# Patient Record
Sex: FEMALE | Race: WHITE | NOT HISPANIC OR LATINO | Employment: OTHER | URBAN - METROPOLITAN AREA
[De-identification: names, ages, dates, MRNs, and addresses within clinical notes are randomized per-mention and may not be internally consistent; named-entity substitution may affect disease eponyms.]

---

## 2017-11-17 ENCOUNTER — CONVERSION ENCOUNTER (OUTPATIENT)
Dept: MAMMOGRAPHY | Facility: HOSPITAL | Age: 47
End: 2017-11-17

## 2018-01-17 ENCOUNTER — CONVERSION ENCOUNTER (OUTPATIENT)
Dept: FAMILY MEDICINE CLINIC | Facility: CLINIC | Age: 48
End: 2018-01-17

## 2018-01-17 ENCOUNTER — OFFICE VISIT CONVERTED (OUTPATIENT)
Dept: FAMILY MEDICINE CLINIC | Facility: CLINIC | Age: 48
End: 2018-01-17
Attending: NURSE PRACTITIONER

## 2018-01-29 ENCOUNTER — CONVERSION ENCOUNTER (OUTPATIENT)
Dept: FAMILY MEDICINE CLINIC | Facility: CLINIC | Age: 48
End: 2018-01-29

## 2018-01-29 ENCOUNTER — OFFICE VISIT CONVERTED (OUTPATIENT)
Dept: FAMILY MEDICINE CLINIC | Facility: CLINIC | Age: 48
End: 2018-01-29
Attending: NURSE PRACTITIONER

## 2018-01-30 ENCOUNTER — OFFICE VISIT CONVERTED (OUTPATIENT)
Dept: NEUROSURGERY | Facility: CLINIC | Age: 48
End: 2018-01-30
Attending: PHYSICIAN ASSISTANT

## 2018-03-12 ENCOUNTER — OFFICE VISIT CONVERTED (OUTPATIENT)
Dept: FAMILY MEDICINE CLINIC | Facility: CLINIC | Age: 48
End: 2018-03-12
Attending: NURSE PRACTITIONER

## 2018-03-26 ENCOUNTER — OFFICE VISIT CONVERTED (OUTPATIENT)
Dept: FAMILY MEDICINE CLINIC | Facility: CLINIC | Age: 48
End: 2018-03-26
Attending: NURSE PRACTITIONER

## 2018-04-04 ENCOUNTER — OFFICE VISIT CONVERTED (OUTPATIENT)
Dept: FAMILY MEDICINE CLINIC | Facility: CLINIC | Age: 48
End: 2018-04-04
Attending: NURSE PRACTITIONER

## 2018-04-06 ENCOUNTER — OFFICE VISIT CONVERTED (OUTPATIENT)
Dept: SURGERY | Facility: CLINIC | Age: 48
End: 2018-04-06
Attending: SURGERY

## 2018-04-13 ENCOUNTER — OFFICE VISIT CONVERTED (OUTPATIENT)
Dept: SURGERY | Facility: CLINIC | Age: 48
End: 2018-04-13
Attending: SURGERY

## 2018-05-04 ENCOUNTER — OFFICE VISIT CONVERTED (OUTPATIENT)
Dept: SURGERY | Facility: CLINIC | Age: 48
End: 2018-05-04
Attending: SURGERY

## 2018-07-10 ENCOUNTER — OFFICE VISIT CONVERTED (OUTPATIENT)
Dept: FAMILY MEDICINE CLINIC | Facility: CLINIC | Age: 48
End: 2018-07-10
Attending: FAMILY MEDICINE

## 2018-07-10 ENCOUNTER — CONVERSION ENCOUNTER (OUTPATIENT)
Dept: FAMILY MEDICINE CLINIC | Facility: CLINIC | Age: 48
End: 2018-07-10

## 2018-08-20 ENCOUNTER — OFFICE VISIT CONVERTED (OUTPATIENT)
Dept: FAMILY MEDICINE CLINIC | Facility: CLINIC | Age: 48
End: 2018-08-20
Attending: NURSE PRACTITIONER

## 2018-08-21 ENCOUNTER — OFFICE VISIT CONVERTED (OUTPATIENT)
Dept: ORTHOPEDIC SURGERY | Facility: CLINIC | Age: 48
End: 2018-08-21
Attending: ORTHOPAEDIC SURGERY

## 2018-08-21 ENCOUNTER — CONVERSION ENCOUNTER (OUTPATIENT)
Dept: ORTHOPEDIC SURGERY | Facility: CLINIC | Age: 48
End: 2018-08-21

## 2018-09-25 ENCOUNTER — OFFICE VISIT CONVERTED (OUTPATIENT)
Dept: GASTROENTEROLOGY | Facility: CLINIC | Age: 48
End: 2018-09-25
Attending: NURSE PRACTITIONER

## 2018-09-25 ENCOUNTER — CONVERSION ENCOUNTER (OUTPATIENT)
Dept: GASTROENTEROLOGY | Facility: CLINIC | Age: 48
End: 2018-09-25

## 2018-09-26 ENCOUNTER — OFFICE VISIT CONVERTED (OUTPATIENT)
Dept: FAMILY MEDICINE CLINIC | Facility: CLINIC | Age: 48
End: 2018-09-26
Attending: NURSE PRACTITIONER

## 2018-10-29 ENCOUNTER — OFFICE VISIT CONVERTED (OUTPATIENT)
Dept: FAMILY MEDICINE CLINIC | Facility: CLINIC | Age: 48
End: 2018-10-29
Attending: NURSE PRACTITIONER

## 2018-11-20 ENCOUNTER — CONVERSION ENCOUNTER (OUTPATIENT)
Dept: OTHER | Facility: HOSPITAL | Age: 48
End: 2018-11-20

## 2019-01-07 ENCOUNTER — OFFICE VISIT CONVERTED (OUTPATIENT)
Dept: GASTROENTEROLOGY | Facility: CLINIC | Age: 49
End: 2019-01-07
Attending: NURSE PRACTITIONER

## 2019-01-10 ENCOUNTER — CONVERSION ENCOUNTER (OUTPATIENT)
Dept: FAMILY MEDICINE CLINIC | Facility: CLINIC | Age: 49
End: 2019-01-10

## 2019-01-10 ENCOUNTER — HOSPITAL ENCOUNTER (OUTPATIENT)
Dept: FAMILY MEDICINE CLINIC | Facility: CLINIC | Age: 49
Discharge: HOME | End: 2019-01-10
Attending: NURSE PRACTITIONER

## 2019-01-10 ENCOUNTER — OFFICE VISIT CONVERTED (OUTPATIENT)
Dept: FAMILY MEDICINE CLINIC | Facility: CLINIC | Age: 49
End: 2019-01-10
Attending: NURSE PRACTITIONER

## 2019-01-11 LAB — 25(OH)D3 SERPL-MCNC: 19.5 NG/ML (ref 30–100)

## 2019-01-17 ENCOUNTER — HOSPITAL ENCOUNTER (OUTPATIENT)
Dept: OTHER | Facility: HOSPITAL | Age: 49
Discharge: HOME | End: 2019-01-17
Attending: NURSE PRACTITIONER

## 2019-01-24 ENCOUNTER — HOSPITAL ENCOUNTER (OUTPATIENT)
Dept: PHYSICAL THERAPY | Facility: CLINIC | Age: 49
Setting detail: RECURRING SERIES
Discharge: HOME | End: 2019-04-03
Attending: NURSE PRACTITIONER

## 2019-01-28 ENCOUNTER — HOSPITAL ENCOUNTER (OUTPATIENT)
Dept: FAMILY MEDICINE CLINIC | Facility: CLINIC | Age: 49
Discharge: HOME | End: 2019-01-28
Attending: NURSE PRACTITIONER

## 2019-01-28 LAB
ALBUMIN SERPL-MCNC: 3.7 G/DL (ref 3.5–5)
ALBUMIN/GLOB SERPL: 1.1 {RATIO} (ref 1.4–2.6)
ALP SERPL-CCNC: 64 U/L (ref 42–98)
ALT SERPL-CCNC: 13 U/L (ref 10–40)
ANION GAP SERPL CALC-SCNC: 16 MMOL/L (ref 8–19)
AST SERPL-CCNC: 16 U/L (ref 15–50)
BILIRUB SERPL-MCNC: <0.15 MG/DL (ref 0.2–1.3)
BUN SERPL-MCNC: 11 MG/DL (ref 5–25)
BUN/CREAT SERPL: 12 {RATIO} (ref 6–20)
CALCIUM SERPL-MCNC: 9.4 MG/DL (ref 8.7–10.4)
CHLORIDE SERPL-SCNC: 101 MMOL/L (ref 99–111)
CHOLEST SERPL-MCNC: 172 MG/DL (ref 107–200)
CHOLEST/HDLC SERPL: 4.2 {RATIO} (ref 3–6)
CONV CO2: 27 MMOL/L (ref 22–32)
CONV TOTAL PROTEIN: 7.1 G/DL (ref 6.3–8.2)
CREAT UR-MCNC: 0.9 MG/DL (ref 0.5–0.9)
GFR SERPLBLD BASED ON 1.73 SQ M-ARVRAT: >60 ML/MIN/{1.73_M2}
GLOBULIN UR ELPH-MCNC: 3.4 G/DL (ref 2–3.5)
GLUCOSE SERPL-MCNC: 73 MG/DL (ref 65–99)
HDLC SERPL-MCNC: 41 MG/DL (ref 40–60)
LDLC SERPL CALC-MCNC: 85 MG/DL (ref 70–100)
OSMOLALITY SERPL CALC.SUM OF ELEC: 286 MOSM/KG (ref 273–304)
POTASSIUM SERPL-SCNC: 4.6 MMOL/L (ref 3.5–5.3)
SODIUM SERPL-SCNC: 139 MMOL/L (ref 135–147)
TRIGL SERPL-MCNC: 230 MG/DL (ref 40–150)
VLDLC SERPL-MCNC: 46 MG/DL (ref 5–37)

## 2019-02-05 ENCOUNTER — OFFICE VISIT CONVERTED (OUTPATIENT)
Dept: FAMILY MEDICINE CLINIC | Facility: CLINIC | Age: 49
End: 2019-02-05
Attending: NURSE PRACTITIONER

## 2019-03-20 ENCOUNTER — HOSPITAL ENCOUNTER (OUTPATIENT)
Dept: ONCOLOGY | Facility: HOSPITAL | Age: 49
Discharge: HOME | End: 2019-03-20
Attending: INTERNAL MEDICINE

## 2019-03-20 LAB
BASOPHILS # BLD AUTO: 0.06 10*3/UL (ref 0–0.2)
BASOPHILS NFR BLD AUTO: 0.8 % (ref 0–3)
CONV ABS IMM GRAN: 0.04 10*3/UL (ref 0–0.2)
CONV IMMATURE GRAN: 0.5 % (ref 0–1.8)
DEPRECATED RDW RBC AUTO: 43.8 FL (ref 36.4–46.3)
EOSINOPHIL # BLD AUTO: 0.12 10*3/UL (ref 0–0.7)
EOSINOPHIL # BLD AUTO: 1.6 % (ref 0–7)
ERYTHROCYTE [DISTWIDTH] IN BLOOD BY AUTOMATED COUNT: 12.8 % (ref 11.7–14.4)
FERRITIN SERPL-MCNC: 251 NG/ML (ref 10–200)
FOLATE SERPL-MCNC: 7.5 NG/ML (ref 4.8–20)
HBA1C MFR BLD: 13.4 G/DL (ref 12–16)
HCT VFR BLD AUTO: 38.6 % (ref 37–47)
IRON SATN MFR SERPL: 22 % (ref 20–55)
IRON SERPL-MCNC: 74 UG/DL (ref 60–170)
LYMPHOCYTES # BLD AUTO: 2.42 10*3/UL (ref 1–5)
MCH RBC QN AUTO: 32.7 PG (ref 27–31)
MCHC RBC AUTO-ENTMCNC: 34.7 G/DL (ref 33–37)
MCV RBC AUTO: 94.1 FL (ref 81–99)
MONOCYTES # BLD AUTO: 0.8 10*3/UL (ref 0.2–1.2)
MONOCYTES NFR BLD AUTO: 10.3 % (ref 3–10)
NEUTROPHILS # BLD AUTO: 4.3 10*3/UL (ref 2–8)
NEUTROPHILS NFR BLD AUTO: 55.5 % (ref 30–85)
NRBC CBCN: 0 % (ref 0–0.7)
PLATELET # BLD AUTO: 279 10*3/UL (ref 130–400)
PMV BLD AUTO: 9.4 FL (ref 9.4–12.3)
RBC # BLD AUTO: 4.1 10*6/UL (ref 4.2–5.4)
TIBC SERPL-MCNC: 340 UG/DL (ref 245–450)
TRANSFERRIN SERPL-MCNC: 238 MG/DL (ref 250–380)
VARIANT LYMPHS NFR BLD MANUAL: 31.3 % (ref 20–45)
VIT B12 SERPL-MCNC: 518 PG/ML (ref 211–911)
WBC # BLD AUTO: 7.74 10*3/UL (ref 4.8–10.8)

## 2019-04-02 ENCOUNTER — OFFICE VISIT CONVERTED (OUTPATIENT)
Dept: ORTHOPEDIC SURGERY | Facility: CLINIC | Age: 49
End: 2019-04-02
Attending: ORTHOPAEDIC SURGERY

## 2019-04-03 ENCOUNTER — HOSPITAL ENCOUNTER (OUTPATIENT)
Dept: ONCOLOGY | Facility: HOSPITAL | Age: 49
Discharge: HOME | End: 2019-04-03
Attending: NURSE PRACTITIONER

## 2019-04-03 ENCOUNTER — OFFICE VISIT CONVERTED (OUTPATIENT)
Dept: ONCOLOGY | Facility: HOSPITAL | Age: 49
End: 2019-04-03
Attending: NURSE PRACTITIONER

## 2019-04-09 ENCOUNTER — HOSPITAL ENCOUNTER (OUTPATIENT)
Dept: OTHER | Facility: HOSPITAL | Age: 49
Discharge: HOME | End: 2019-04-09
Attending: NURSE PRACTITIONER

## 2019-04-09 LAB
FERRITIN SERPL-MCNC: 175 NG/ML (ref 10–200)
HBA1C MFR BLD: 12.7 G/DL (ref 12–16)
HCT VFR BLD AUTO: 38.2 % (ref 37–47)

## 2019-04-10 ENCOUNTER — HOSPITAL ENCOUNTER (OUTPATIENT)
Dept: FAMILY MEDICINE CLINIC | Facility: CLINIC | Age: 49
Discharge: HOME | End: 2019-04-10
Attending: NURSE PRACTITIONER

## 2019-04-10 ENCOUNTER — OFFICE VISIT CONVERTED (OUTPATIENT)
Dept: FAMILY MEDICINE CLINIC | Facility: CLINIC | Age: 49
End: 2019-04-10
Attending: NURSE PRACTITIONER

## 2019-04-10 LAB
25(OH)D3 SERPL-MCNC: 22.7 NG/ML (ref 30–100)
FERRITIN SERPL-MCNC: 161 NG/ML (ref 10–200)
IRON SATN MFR SERPL: 24 % (ref 20–55)
IRON SERPL-MCNC: 79 UG/DL (ref 60–170)
TIBC SERPL-MCNC: 329 UG/DL (ref 245–450)
TRANSFERRIN SERPL-MCNC: 230 MG/DL (ref 250–380)

## 2019-04-17 ENCOUNTER — HOSPITAL ENCOUNTER (OUTPATIENT)
Dept: OTHER | Facility: HOSPITAL | Age: 49
Discharge: HOME | End: 2019-04-17
Attending: NURSE PRACTITIONER

## 2019-05-06 ENCOUNTER — CONVERSION ENCOUNTER (OUTPATIENT)
Dept: NEUROLOGY | Facility: CLINIC | Age: 49
End: 2019-05-06

## 2019-05-06 ENCOUNTER — OFFICE VISIT CONVERTED (OUTPATIENT)
Dept: NEUROSURGERY | Facility: CLINIC | Age: 49
End: 2019-05-06
Attending: PHYSICIAN ASSISTANT

## 2019-06-05 ENCOUNTER — HOSPITAL ENCOUNTER (OUTPATIENT)
Dept: FAMILY MEDICINE CLINIC | Facility: CLINIC | Age: 49
Discharge: HOME | End: 2019-06-05
Attending: NURSE PRACTITIONER

## 2019-06-05 ENCOUNTER — OFFICE VISIT CONVERTED (OUTPATIENT)
Dept: FAMILY MEDICINE CLINIC | Facility: CLINIC | Age: 49
End: 2019-06-05
Attending: NURSE PRACTITIONER

## 2019-06-05 ENCOUNTER — CONVERSION ENCOUNTER (OUTPATIENT)
Dept: FAMILY MEDICINE CLINIC | Facility: CLINIC | Age: 49
End: 2019-06-05

## 2019-06-05 LAB
25(OH)D3 SERPL-MCNC: 29.2 NG/ML (ref 30–100)
ALBUMIN SERPL-MCNC: 3.9 G/DL (ref 3.5–5)
ALBUMIN/GLOB SERPL: 1.1 {RATIO} (ref 1.4–2.6)
ALP SERPL-CCNC: 75 U/L (ref 42–98)
ALT SERPL-CCNC: 18 U/L (ref 10–40)
ANION GAP SERPL CALC-SCNC: 15 MMOL/L (ref 8–19)
AST SERPL-CCNC: 27 U/L (ref 15–50)
BASOPHILS # BLD AUTO: 0.07 10*3/UL (ref 0–0.2)
BASOPHILS NFR BLD AUTO: 0.8 % (ref 0–3)
BILIRUB SERPL-MCNC: 0.42 MG/DL (ref 0.2–1.3)
BUN SERPL-MCNC: 5 MG/DL (ref 5–25)
BUN/CREAT SERPL: 6 {RATIO} (ref 6–20)
CALCIUM SERPL-MCNC: 9.1 MG/DL (ref 8.7–10.4)
CHLORIDE SERPL-SCNC: 98 MMOL/L (ref 99–111)
CONV ABS IMM GRAN: 0.05 10*3/UL (ref 0–0.2)
CONV CO2: 27 MMOL/L (ref 22–32)
CONV IMMATURE GRAN: 0.6 % (ref 0–1.8)
CONV TOTAL PROTEIN: 7.3 G/DL (ref 6.3–8.2)
CREAT UR-MCNC: 0.83 MG/DL (ref 0.5–0.9)
DEPRECATED RDW RBC AUTO: 45.4 FL (ref 36.4–46.3)
EOSINOPHIL # BLD AUTO: 0.22 10*3/UL (ref 0–0.7)
EOSINOPHIL # BLD AUTO: 2.5 % (ref 0–7)
ERYTHROCYTE [DISTWIDTH] IN BLOOD BY AUTOMATED COUNT: 12.6 % (ref 11.7–14.4)
FERRITIN SERPL-MCNC: 179 NG/ML (ref 10–200)
FOLATE SERPL-MCNC: 10.3 NG/ML (ref 4.8–20)
FSH SERPL-ACNC: 7 M[IU]/ML
GFR SERPLBLD BASED ON 1.73 SQ M-ARVRAT: >60 ML/MIN/{1.73_M2}
GLOBULIN UR ELPH-MCNC: 3.4 G/DL (ref 2–3.5)
GLUCOSE SERPL-MCNC: 113 MG/DL (ref 65–99)
HBA1C MFR BLD: 14.6 G/DL (ref 12–16)
HCT VFR BLD AUTO: 43.6 % (ref 37–47)
IRON SATN MFR SERPL: 33 % (ref 20–55)
IRON SERPL-MCNC: 110 UG/DL (ref 60–170)
LH SERPL-ACNC: 12.9 M[IU]/ML
LYMPHOCYTES # BLD AUTO: 2.68 10*3/UL (ref 1–5)
MCH RBC QN AUTO: 32.9 PG (ref 27–31)
MCHC RBC AUTO-ENTMCNC: 33.5 G/DL (ref 33–37)
MCV RBC AUTO: 98.2 FL (ref 81–99)
MONOCYTES # BLD AUTO: 0.75 10*3/UL (ref 0.2–1.2)
MONOCYTES NFR BLD AUTO: 8.6 % (ref 3–10)
NEUTROPHILS # BLD AUTO: 5 10*3/UL (ref 2–8)
NEUTROPHILS NFR BLD AUTO: 56.9 % (ref 30–85)
NRBC CBCN: 0 % (ref 0–0.7)
OSMOLALITY SERPL CALC.SUM OF ELEC: 282 MOSM/KG (ref 273–304)
PLATELET # BLD AUTO: 293 10*3/UL (ref 130–400)
PMV BLD AUTO: 10.1 FL (ref 9.4–12.3)
POTASSIUM SERPL-SCNC: 3.4 MMOL/L (ref 3.5–5.3)
RBC # BLD AUTO: 4.44 10*6/UL (ref 4.2–5.4)
SODIUM SERPL-SCNC: 137 MMOL/L (ref 135–147)
T4 FREE SERPL-MCNC: 1.2 NG/DL (ref 0.9–1.8)
TIBC SERPL-MCNC: 333 UG/DL (ref 245–450)
TRANSFERRIN SERPL-MCNC: 233 MG/DL (ref 250–380)
TSH SERPL-ACNC: 1.65 M[IU]/L (ref 0.27–4.2)
VARIANT LYMPHS NFR BLD MANUAL: 30.6 % (ref 20–45)
VIT B12 SERPL-MCNC: 553 PG/ML (ref 211–911)
WBC # BLD AUTO: 8.77 10*3/UL (ref 4.8–10.8)

## 2019-06-09 LAB — CONV ESTROGENS, TOTAL, SERUM: 394 PG/ML

## 2019-07-01 ENCOUNTER — OFFICE VISIT CONVERTED (OUTPATIENT)
Dept: NEUROSURGERY | Facility: CLINIC | Age: 49
End: 2019-07-01
Attending: PHYSICIAN ASSISTANT

## 2019-07-17 ENCOUNTER — OFFICE VISIT CONVERTED (OUTPATIENT)
Dept: GASTROENTEROLOGY | Facility: CLINIC | Age: 49
End: 2019-07-17
Attending: NURSE PRACTITIONER

## 2019-08-07 ENCOUNTER — HOSPITAL ENCOUNTER (OUTPATIENT)
Dept: FAMILY MEDICINE CLINIC | Facility: CLINIC | Age: 49
Discharge: HOME | End: 2019-08-07
Attending: NURSE PRACTITIONER

## 2019-08-07 ENCOUNTER — OFFICE VISIT CONVERTED (OUTPATIENT)
Dept: FAMILY MEDICINE CLINIC | Facility: CLINIC | Age: 49
End: 2019-08-07
Attending: NURSE PRACTITIONER

## 2019-08-07 LAB — 25(OH)D3 SERPL-MCNC: 31.7 NG/ML (ref 30–100)

## 2019-11-04 ENCOUNTER — HOSPITAL ENCOUNTER (OUTPATIENT)
Dept: FAMILY MEDICINE CLINIC | Facility: CLINIC | Age: 49
Discharge: HOME | End: 2019-11-04
Attending: NURSE PRACTITIONER

## 2019-11-04 ENCOUNTER — OFFICE VISIT CONVERTED (OUTPATIENT)
Dept: FAMILY MEDICINE CLINIC | Facility: CLINIC | Age: 49
End: 2019-11-04
Attending: NURSE PRACTITIONER

## 2019-11-04 LAB
ALBUMIN SERPL-MCNC: 3.9 G/DL (ref 3.5–5)
ALBUMIN/GLOB SERPL: 1.2 {RATIO} (ref 1.4–2.6)
ALP SERPL-CCNC: 62 U/L (ref 42–98)
ALT SERPL-CCNC: 13 U/L (ref 10–40)
ANION GAP SERPL CALC-SCNC: 17 MMOL/L (ref 8–19)
AST SERPL-CCNC: 25 U/L (ref 15–50)
BASOPHILS # BLD AUTO: 0.07 10*3/UL (ref 0–0.2)
BASOPHILS NFR BLD AUTO: 0.8 % (ref 0–3)
BILIRUB SERPL-MCNC: 0.35 MG/DL (ref 0.2–1.3)
BUN SERPL-MCNC: 3 MG/DL (ref 5–25)
BUN/CREAT SERPL: 3 {RATIO} (ref 6–20)
CALCIUM SERPL-MCNC: 9.7 MG/DL (ref 8.7–10.4)
CHLORIDE SERPL-SCNC: 97 MMOL/L (ref 99–111)
CONV ABS IMM GRAN: 0.02 10*3/UL (ref 0–0.2)
CONV CO2: 28 MMOL/L (ref 22–32)
CONV IMMATURE GRAN: 0.2 % (ref 0–1.8)
CONV TOTAL PROTEIN: 7.1 G/DL (ref 6.3–8.2)
CREAT UR-MCNC: 0.9 MG/DL (ref 0.5–0.9)
DEPRECATED RDW RBC AUTO: 44.3 FL (ref 36.4–46.3)
EOSINOPHIL # BLD AUTO: 0.4 10*3/UL (ref 0–0.7)
EOSINOPHIL # BLD AUTO: 4.4 % (ref 0–7)
ERYTHROCYTE [DISTWIDTH] IN BLOOD BY AUTOMATED COUNT: 12.7 % (ref 11.7–14.4)
ERYTHROCYTE [SEDIMENTATION RATE] IN BLOOD: 12 MM/H (ref 0–30)
GFR SERPLBLD BASED ON 1.73 SQ M-ARVRAT: >60 ML/MIN/{1.73_M2}
GLOBULIN UR ELPH-MCNC: 3.2 G/DL (ref 2–3.5)
GLUCOSE SERPL-MCNC: 87 MG/DL (ref 65–99)
HCT VFR BLD AUTO: 39 % (ref 37–47)
HGB BLD-MCNC: 13.6 G/DL (ref 12–16)
LYMPHOCYTES # BLD AUTO: 3.43 10*3/UL (ref 1–5)
LYMPHOCYTES NFR BLD AUTO: 38 % (ref 20–45)
MCH RBC QN AUTO: 33.3 PG (ref 27–31)
MCHC RBC AUTO-ENTMCNC: 34.9 G/DL (ref 33–37)
MCV RBC AUTO: 95.4 FL (ref 81–99)
MONOCYTES # BLD AUTO: 0.82 10*3/UL (ref 0.2–1.2)
MONOCYTES NFR BLD AUTO: 9.1 % (ref 3–10)
NEUTROPHILS # BLD AUTO: 4.29 10*3/UL (ref 2–8)
NEUTROPHILS NFR BLD AUTO: 47.5 % (ref 30–85)
NRBC CBCN: 0 % (ref 0–0.7)
OSMOLALITY SERPL CALC.SUM OF ELEC: 282 MOSM/KG (ref 273–304)
PLATELET # BLD AUTO: 247 10*3/UL (ref 130–400)
PMV BLD AUTO: 10.3 FL (ref 9.4–12.3)
POTASSIUM SERPL-SCNC: 3.9 MMOL/L (ref 3.5–5.3)
RBC # BLD AUTO: 4.09 10*6/UL (ref 4.2–5.4)
SODIUM SERPL-SCNC: 138 MMOL/L (ref 135–147)
WBC # BLD AUTO: 9.03 10*3/UL (ref 4.8–10.8)

## 2019-11-05 LAB — FERRITIN SERPL-MCNC: 140 NG/ML (ref 10–200)

## 2019-11-11 ENCOUNTER — HOSPITAL ENCOUNTER (OUTPATIENT)
Dept: OTHER | Facility: HOSPITAL | Age: 49
Discharge: HOME | End: 2019-11-11
Attending: NURSE PRACTITIONER

## 2019-11-19 ENCOUNTER — OFFICE VISIT CONVERTED (OUTPATIENT)
Dept: FAMILY MEDICINE CLINIC | Facility: CLINIC | Age: 49
End: 2019-11-19
Attending: NURSE PRACTITIONER

## 2020-03-10 ENCOUNTER — HOSPITAL ENCOUNTER (OUTPATIENT)
Dept: OTHER | Facility: HOSPITAL | Age: 50
Discharge: HOME | End: 2020-03-10
Attending: NURSE PRACTITIONER

## 2020-04-01 ENCOUNTER — HOSPITAL ENCOUNTER (OUTPATIENT)
Dept: ONCOLOGY | Facility: HOSPITAL | Age: 50
Discharge: HOME | End: 2020-04-01
Attending: NURSE PRACTITIONER

## 2020-04-01 ENCOUNTER — OFFICE VISIT CONVERTED (OUTPATIENT)
Dept: ONCOLOGY | Facility: HOSPITAL | Age: 50
End: 2020-04-01
Attending: NURSE PRACTITIONER

## 2020-04-01 LAB
ALBUMIN SERPL-MCNC: 4.2 G/DL (ref 3.5–5)
ALBUMIN/GLOB SERPL: 1.3 {RATIO} (ref 1.4–2.6)
ALP SERPL-CCNC: 77 U/L (ref 42–98)
ALT SERPL-CCNC: 15 U/L (ref 10–40)
ANION GAP SERPL CALC-SCNC: 17 MMOL/L (ref 8–19)
AST SERPL-CCNC: 22 U/L (ref 15–50)
BASOPHILS # BLD AUTO: 0.08 10*3/UL (ref 0–0.2)
BASOPHILS NFR BLD AUTO: 0.8 % (ref 0–3)
BILIRUB SERPL-MCNC: 0.37 MG/DL (ref 0.2–1.3)
BUN SERPL-MCNC: 5 MG/DL (ref 5–25)
BUN/CREAT SERPL: 7 {RATIO} (ref 6–20)
CALCIUM SERPL-MCNC: 9.7 MG/DL (ref 8.7–10.4)
CHLORIDE SERPL-SCNC: 99 MMOL/L (ref 99–111)
CONV ABS IMM GRAN: 0.04 10*3/UL (ref 0–0.2)
CONV CO2: 28 MMOL/L (ref 22–32)
CONV IMMATURE GRAN: 0.4 % (ref 0–1.8)
CONV TOTAL PROTEIN: 7.5 G/DL (ref 6.3–8.2)
CREAT UR-MCNC: 0.76 MG/DL (ref 0.5–0.9)
DEPRECATED RDW RBC AUTO: 47.5 FL (ref 36.4–46.3)
EOSINOPHIL # BLD AUTO: 0.26 10*3/UL (ref 0–0.7)
EOSINOPHIL # BLD AUTO: 2.7 % (ref 0–7)
ERYTHROCYTE [DISTWIDTH] IN BLOOD BY AUTOMATED COUNT: 13.1 % (ref 11.7–14.4)
FERRITIN SERPL-MCNC: 193 NG/ML (ref 10–200)
GFR SERPLBLD BASED ON 1.73 SQ M-ARVRAT: >60 ML/MIN/{1.73_M2}
GLOBULIN UR ELPH-MCNC: 3.3 G/DL (ref 2–3.5)
GLUCOSE SERPL-MCNC: 62 MG/DL (ref 65–99)
HCT VFR BLD AUTO: 45.3 % (ref 37–47)
HGB BLD-MCNC: 15.1 G/DL (ref 12–16)
IRON SATN MFR SERPL: 36 % (ref 20–55)
IRON SERPL-MCNC: 110 UG/DL (ref 60–170)
LDH SERPL-CCNC: 158 U/L (ref 120–240)
LYMPHOCYTES # BLD AUTO: 2.77 10*3/UL (ref 1–5)
LYMPHOCYTES NFR BLD AUTO: 29.1 % (ref 20–45)
MAGNESIUM SERPL-MCNC: 1.57 MG/DL (ref 1.6–2.3)
MCH RBC QN AUTO: 32.9 PG (ref 27–31)
MCHC RBC AUTO-ENTMCNC: 33.3 G/DL (ref 33–37)
MCV RBC AUTO: 98.7 FL (ref 81–99)
MONOCYTES # BLD AUTO: 0.78 10*3/UL (ref 0.2–1.2)
MONOCYTES NFR BLD AUTO: 8.2 % (ref 3–10)
NEUTROPHILS # BLD AUTO: 5.59 10*3/UL (ref 2–8)
NEUTROPHILS NFR BLD AUTO: 58.8 % (ref 30–85)
NRBC CBCN: 0 % (ref 0–0.7)
OSMOLALITY SERPL CALC.SUM OF ELEC: 285 MOSM/KG (ref 273–304)
PLATELET # BLD AUTO: 294 10*3/UL (ref 130–400)
PMV BLD AUTO: 10.2 FL (ref 9.4–12.3)
POTASSIUM SERPL-SCNC: 3.8 MMOL/L (ref 3.5–5.3)
RBC # BLD AUTO: 4.59 10*6/UL (ref 4.2–5.4)
SODIUM SERPL-SCNC: 140 MMOL/L (ref 135–147)
TIBC SERPL-MCNC: 307 UG/DL (ref 245–450)
TRANSFERRIN SERPL-MCNC: 215 MG/DL (ref 250–380)
WBC # BLD AUTO: 9.52 10*3/UL (ref 4.8–10.8)

## 2020-06-02 ENCOUNTER — HOSPITAL ENCOUNTER (OUTPATIENT)
Dept: OTHER | Facility: HOSPITAL | Age: 50
Discharge: HOME | End: 2020-06-02
Attending: NURSE PRACTITIONER

## 2020-06-05 ENCOUNTER — OFFICE VISIT CONVERTED (OUTPATIENT)
Dept: ONCOLOGY | Facility: HOSPITAL | Age: 50
End: 2020-06-05
Attending: NURSE PRACTITIONER

## 2020-06-05 ENCOUNTER — HOSPITAL ENCOUNTER (OUTPATIENT)
Dept: ONCOLOGY | Facility: HOSPITAL | Age: 50
Discharge: HOME | End: 2020-06-05
Attending: NURSE PRACTITIONER

## 2020-06-05 LAB
APPEARANCE UR: CLEAR
BILIRUB UR QL: NEGATIVE
COLOR UR: YELLOW
CONV BACTERIA: NEGATIVE
CONV COLLECTION SOURCE (UA): ABNORMAL
CONV UROBILINOGEN IN URINE BY AUTOMATED TEST STRIP: 0.2 {EHRLICHU}/DL (ref 0.1–1)
GLUCOSE UR QL: NEGATIVE MG/DL
HGB UR QL STRIP: NEGATIVE
KETONES UR QL STRIP: NEGATIVE MG/DL
LEUKOCYTE ESTERASE UR QL STRIP: ABNORMAL
NITRITE UR QL STRIP: POSITIVE
PH UR STRIP.AUTO: 7 [PH] (ref 5–8)
PROT UR QL: NEGATIVE MG/DL
RBC #/AREA URNS HPF: ABNORMAL /[HPF]
SP GR UR: 1.01 (ref 1–1.03)
SQUAMOUS SPT QL MICRO: ABNORMAL /[HPF]
WBC #/AREA URNS HPF: ABNORMAL /[HPF]

## 2020-06-07 LAB — BACTERIA UR CULT: NORMAL

## 2020-06-23 ENCOUNTER — HOSPITAL ENCOUNTER (OUTPATIENT)
Dept: FAMILY MEDICINE CLINIC | Facility: CLINIC | Age: 50
Discharge: HOME | End: 2020-06-23
Attending: NURSE PRACTITIONER

## 2020-06-23 ENCOUNTER — OFFICE VISIT CONVERTED (OUTPATIENT)
Dept: FAMILY MEDICINE CLINIC | Facility: CLINIC | Age: 50
End: 2020-06-23
Attending: NURSE PRACTITIONER

## 2020-06-24 ENCOUNTER — HOSPITAL ENCOUNTER (OUTPATIENT)
Dept: OTHER | Facility: HOSPITAL | Age: 50
Discharge: HOME | End: 2020-06-24
Attending: NURSE PRACTITIONER

## 2020-06-24 LAB
ALBUMIN SERPL-MCNC: 3.7 G/DL (ref 3.5–5)
ALBUMIN SERPL-MCNC: 3.8 G/DL (ref 3.5–5)
ALBUMIN/GLOB SERPL: 1.2 {RATIO} (ref 1.4–2.6)
ALBUMIN/GLOB SERPL: 1.2 {RATIO} (ref 1.4–2.6)
ALP SERPL-CCNC: 69 U/L (ref 42–98)
ALP SERPL-CCNC: 70 U/L (ref 42–98)
ALT SERPL-CCNC: 12 U/L (ref 10–40)
ALT SERPL-CCNC: 14 U/L (ref 10–40)
ANION GAP SERPL CALC-SCNC: 18 MMOL/L (ref 8–19)
ANION GAP SERPL CALC-SCNC: 19 MMOL/L (ref 8–19)
APPEARANCE UR: ABNORMAL
AST SERPL-CCNC: 27 U/L (ref 15–50)
AST SERPL-CCNC: 28 U/L (ref 15–50)
BASOPHILS # BLD AUTO: 0.08 10*3/UL (ref 0–0.2)
BASOPHILS NFR BLD AUTO: 0.9 % (ref 0–3)
BILIRUB SERPL-MCNC: 0.24 MG/DL (ref 0.2–1.3)
BILIRUB SERPL-MCNC: 0.54 MG/DL (ref 0.2–1.3)
BILIRUB UR QL: NEGATIVE
BUN SERPL-MCNC: 3 MG/DL (ref 5–25)
BUN SERPL-MCNC: 3 MG/DL (ref 5–25)
BUN/CREAT SERPL: 4 {RATIO} (ref 6–20)
BUN/CREAT SERPL: 4 {RATIO} (ref 6–20)
CALCIUM SERPL-MCNC: 9.4 MG/DL (ref 8.7–10.4)
CALCIUM SERPL-MCNC: 9.5 MG/DL (ref 8.7–10.4)
CHLORIDE SERPL-SCNC: 100 MMOL/L (ref 99–111)
CHLORIDE SERPL-SCNC: 101 MMOL/L (ref 99–111)
CHOLEST SERPL-MCNC: 165 MG/DL (ref 107–200)
CHOLEST/HDLC SERPL: 4.1 {RATIO} (ref 3–6)
COLOR UR: YELLOW
CONV ABS IMM GRAN: 0.03 10*3/UL (ref 0–0.2)
CONV BACTERIA: ABNORMAL
CONV CO2: 22 MMOL/L (ref 22–32)
CONV CO2: 22 MMOL/L (ref 22–32)
CONV COLLECTION SOURCE (UA): ABNORMAL
CONV HYALINE CASTS IN URINE MICRO: ABNORMAL /[LPF]
CONV IMMATURE GRAN: 0.3 % (ref 0–1.8)
CONV TOTAL PROTEIN: 6.7 G/DL (ref 6.3–8.2)
CONV TOTAL PROTEIN: 6.9 G/DL (ref 6.3–8.2)
CONV UROBILINOGEN IN URINE BY AUTOMATED TEST STRIP: 1 {EHRLICHU}/DL (ref 0.1–1)
CREAT UR-MCNC: 0.78 MG/DL (ref 0.5–0.9)
CREAT UR-MCNC: 0.8 MG/DL (ref 0.5–0.9)
DEPRECATED RDW RBC AUTO: 44.5 FL (ref 36.4–46.3)
EOSINOPHIL # BLD AUTO: 0.15 10*3/UL (ref 0–0.7)
EOSINOPHIL # BLD AUTO: 1.7 % (ref 0–7)
ERYTHROCYTE [DISTWIDTH] IN BLOOD BY AUTOMATED COUNT: 12.5 % (ref 11.7–14.4)
GFR SERPLBLD BASED ON 1.73 SQ M-ARVRAT: >60 ML/MIN/{1.73_M2}
GFR SERPLBLD BASED ON 1.73 SQ M-ARVRAT: >60 ML/MIN/{1.73_M2}
GLOBULIN UR ELPH-MCNC: 3 G/DL (ref 2–3.5)
GLOBULIN UR ELPH-MCNC: 3.1 G/DL (ref 2–3.5)
GLUCOSE SERPL-MCNC: 82 MG/DL (ref 65–99)
GLUCOSE SERPL-MCNC: 96 MG/DL (ref 65–99)
GLUCOSE UR QL: NEGATIVE MG/DL
HCT VFR BLD AUTO: 43.9 % (ref 37–47)
HDLC SERPL-MCNC: 40 MG/DL (ref 40–60)
HGB BLD-MCNC: 14.7 G/DL (ref 12–16)
HGB UR QL STRIP: NEGATIVE
KETONES UR QL STRIP: NEGATIVE MG/DL
LDLC SERPL CALC-MCNC: 104 MG/DL (ref 70–100)
LEUKOCYTE ESTERASE UR QL STRIP: NEGATIVE
LYMPHOCYTES # BLD AUTO: 2.89 10*3/UL (ref 1–5)
LYMPHOCYTES NFR BLD AUTO: 32 % (ref 20–45)
MCH RBC QN AUTO: 32.4 PG (ref 27–31)
MCHC RBC AUTO-ENTMCNC: 33.5 G/DL (ref 33–37)
MCV RBC AUTO: 96.7 FL (ref 81–99)
MONOCYTES # BLD AUTO: 0.65 10*3/UL (ref 0.2–1.2)
MONOCYTES NFR BLD AUTO: 7.2 % (ref 3–10)
NEUTROPHILS # BLD AUTO: 5.22 10*3/UL (ref 2–8)
NEUTROPHILS NFR BLD AUTO: 57.9 % (ref 30–85)
NITRITE UR QL STRIP: NEGATIVE
NRBC CBCN: 0 % (ref 0–0.7)
OSMOLALITY SERPL CALC.SUM OF ELEC: 280 MOSM/KG (ref 273–304)
OSMOLALITY SERPL CALC.SUM OF ELEC: 280 MOSM/KG (ref 273–304)
PH UR STRIP.AUTO: 7.5 [PH] (ref 5–8)
PLATELET # BLD AUTO: 271 10*3/UL (ref 130–400)
PMV BLD AUTO: 10.3 FL (ref 9.4–12.3)
POTASSIUM SERPL-SCNC: 3.9 MMOL/L (ref 3.5–5.3)
POTASSIUM SERPL-SCNC: 4 MMOL/L (ref 3.5–5.3)
PROT UR QL: ABNORMAL MG/DL
RBC # BLD AUTO: 4.54 10*6/UL (ref 4.2–5.4)
RBC #/AREA URNS HPF: ABNORMAL /[HPF]
SODIUM SERPL-SCNC: 137 MMOL/L (ref 135–147)
SODIUM SERPL-SCNC: 137 MMOL/L (ref 135–147)
SP GR UR: 1.02 (ref 1–1.03)
SQUAMOUS SPT QL MICRO: ABNORMAL /[HPF]
TRIGL SERPL-MCNC: 106 MG/DL (ref 40–150)
VLDLC SERPL-MCNC: 21 MG/DL (ref 5–37)
WBC # BLD AUTO: 9.02 10*3/UL (ref 4.8–10.8)
WBC #/AREA URNS HPF: ABNORMAL /[HPF]

## 2020-06-25 LAB
CHOLEST SERPL-MCNC: 169 MG/DL (ref 107–200)
CHOLEST/HDLC SERPL: 4.3 {RATIO} (ref 3–6)
HDLC SERPL-MCNC: 39 MG/DL (ref 40–60)
LDLC SERPL CALC-MCNC: 108 MG/DL (ref 70–100)
TRIGL SERPL-MCNC: 109 MG/DL (ref 40–150)
VLDLC SERPL-MCNC: 22 MG/DL (ref 5–37)

## 2020-06-26 LAB
BACTERIA UR CULT: NORMAL
BACTERIA UR CULT: NORMAL

## 2020-06-30 ENCOUNTER — OFFICE VISIT CONVERTED (OUTPATIENT)
Dept: UROLOGY | Facility: CLINIC | Age: 50
End: 2020-06-30
Attending: UROLOGY

## 2020-06-30 ENCOUNTER — CONVERSION ENCOUNTER (OUTPATIENT)
Dept: SURGERY | Facility: CLINIC | Age: 50
End: 2020-06-30

## 2020-07-16 ENCOUNTER — PROCEDURE VISIT CONVERTED (OUTPATIENT)
Dept: UROLOGY | Facility: CLINIC | Age: 50
End: 2020-07-16
Attending: UROLOGY

## 2020-08-06 ENCOUNTER — HOSPITAL ENCOUNTER (OUTPATIENT)
Dept: ONCOLOGY | Facility: HOSPITAL | Age: 50
Setting detail: RECURRING SERIES
Discharge: STILL ADMITTED OR ADMINISTRATIVE | End: 2020-11-02
Attending: NURSE PRACTITIONER

## 2020-08-06 LAB
FERRITIN SERPL-MCNC: 153 NG/ML (ref 10–200)
HCT VFR BLD AUTO: 44.2 % (ref 37–47)
HGB BLD-MCNC: 15 G/DL (ref 12–16)

## 2020-11-23 ENCOUNTER — OFFICE VISIT CONVERTED (OUTPATIENT)
Dept: GASTROENTEROLOGY | Facility: CLINIC | Age: 50
End: 2020-11-23
Attending: NURSE PRACTITIONER

## 2020-11-24 ENCOUNTER — TELEPHONE CONVERTED (OUTPATIENT)
Dept: FAMILY MEDICINE CLINIC | Facility: CLINIC | Age: 50
End: 2020-11-24
Attending: NURSE PRACTITIONER

## 2020-11-30 ENCOUNTER — HOSPITAL ENCOUNTER (OUTPATIENT)
Dept: OTHER | Facility: HOSPITAL | Age: 50
Discharge: HOME | End: 2020-11-30
Attending: NURSE PRACTITIONER

## 2020-11-30 LAB
CREAT BLD-MCNC: 0.7 MG/DL (ref 0.6–1.4)
GFR SERPLBLD BASED ON 1.73 SQ M-ARVRAT: >60 ML/MIN/{1.73_M2}

## 2020-12-16 ENCOUNTER — TELEPHONE CONVERTED (OUTPATIENT)
Dept: UROLOGY | Facility: CLINIC | Age: 50
End: 2020-12-16
Attending: UROLOGY

## 2021-01-26 ENCOUNTER — OFFICE VISIT CONVERTED (OUTPATIENT)
Dept: GASTROENTEROLOGY | Facility: CLINIC | Age: 51
End: 2021-01-26
Attending: NURSE PRACTITIONER

## 2021-02-24 ENCOUNTER — OFFICE VISIT CONVERTED (OUTPATIENT)
Dept: FAMILY MEDICINE CLINIC | Facility: CLINIC | Age: 51
End: 2021-02-24
Attending: NURSE PRACTITIONER

## 2021-02-24 ENCOUNTER — CONVERSION ENCOUNTER (OUTPATIENT)
Dept: FAMILY MEDICINE CLINIC | Facility: CLINIC | Age: 51
End: 2021-02-24

## 2021-05-07 NOTE — PROGRESS NOTES
Progress Note      Patient Name: Holly Morin   Patient ID: [id number]   Sex: Female   YOB: 1970        Visit Date: June 5, 2019    Provider: BRANDO Mott   Location: [facility]   Location Address: [address] [place] [address]   [place]  [id number]   Location Phone: [phone]          Chief Complaint     follow up on lab work       History Of Present Illness  Holly Morin is a 48 year old /White female who presents for evaluation and treatment of:      pt has appoint with pain mgt 7/17/19    here for F/U on her labs for the elevated ferritin and the iron and the Vit D and then also needs the etodaloac refilled and helping--DDD of the Lspine and  right knee pain    goes from hot flash to old and covers on and covers off-Hx of partal hysterectomy     grief and depression per pt report is improving still missed her father--and then working on this at home and does positive self talk--and really helps-- very supportive     pt admits likes to stay in her room--like it's her happy place--    granddaughter will be born any day now--       Past Medical History  Disease Name Date Onset Notes   Abdominal pain, RUQ --  --    Anxiety --  --    Arthritis --  --    Bloating symptom --  --    Change in stool --  --    Colitis --  --    Colon Cancer --  --    Depression --  --    DJD (degenerative joint disease) of knee --  --    GERD (gastroesophageal reflux disease) --  --    Irritable bowel syndrome --  --    Seasonal allergies --  --          Past Surgical History  Procedure Name Date Notes   Cholecstectomy --  --    Cholecystecomy --  --    Gallbladder --  --    Hysterectomy --  --    Knee surgery --  --          Medication List  Name Date Started Instructions   baclofen 20 mg oral tablet 02/05/2019 take 1 tablet by oral route 3 times a day as needed for LBP or knee pain   buspirone 30 mg oral tablet 04/10/2019 take 1 tablet (30 mg) by oral route 2 times  per day for 30 days   etodolac 400 mg oral tablet 02/05/2019 TAKE ONE TABLET BY MOUTH THREE TIMES DAILY AS NEEDED FOR PAIN   Linzess 145 mcg oral capsule 08/20/2018 take 1 capsule (145 mcg) by oral route once daily on an empty stomach at least 30 minutes before 1st meal of the day   pantoprazole 40 mg oral tablet,delayed release (DR/EC) 08/20/2018 take 1 tablet (40 mg) by oral route once daily   ranitidine HCl 300 mg oral tablet 08/20/2018 take 1 tablet (300 mg) by oral route once daily at bedtime for 30 days   trazodone 100 mg oral tablet 04/10/2019 take 1 tablet (100 mg) by oral route once daily at bedtime   venlafaxine 150 mg oral capsule,extended release 24hr 04/10/2019 take 1 capsule (150 mg) by oral route once daily   Vitamin D2 50,000 unit oral capsule 04/11/2019 take 1 capsule (50,000 unit) by oral route once weekly for 30 days         Allergy List  Allergen Name Date Reaction Notes   PENICILLINS --  --  --    SULFA (SULFONAMIDES) --  --  --          Family Medical History  Disease Name Relative/Age Notes   Asthma Sister/   Sister   Depression Father/  Mother/   Father; Mother   DM Type II Mother/   Mother   Cardiac Conduction Disorder  --    Hypertension Father/   Father   Lung cancer  --    Diabetes mellitus, type II  --    Arthrtis Father/  Mother/   Father; Mother   Osteoporosis Mother/   Mother   Alcohol Abuse Father/   Father   Mother, Grandmother, or Sister developed Heart Disease before the age of 65  --          Social History  Finding Status Start/Stop Quantity Notes   Alcohol Never --/-- --  never drinks alcohol   Caffeine --  --/-- --  --    Exercises regularly --  --/-- --  0 times per week    --  --/-- --  --    Recreational Drug Use Never --/-- --  never used   Tobacco Never --/-- 2 PPD --    Uses seatbelts --  --/-- --  yes         Immunizations  NameDate Admin Mfg Trade Name Lot Number Route Inj VIS Given VIS Publication   Iakatmbrz47/29/2018 PMC FLUARIX [id number] IM  10/29/2018  08/07/2015   Comments: NDC [id number]   Dvbdrqdho19/01/2017 UNK Unknown TradeName [id number] NE NE 04/04/2018 08/07/2015   Comments:          Review of Systems  · Constitutional  o Admits  o : fatigue  · HENT  o Denies  o : hearing loss or ringing, chronic sinus problem, swollen glands in neck  · Cardiovascular  o Denies  o : chest pain, palpitations (fast, fluttering, or skipping beats), swelling (feet, ankles, hands), shortness of breath while walking or lying flat  · Respiratory  o Denies  o : shortness of breath, asthma or wheezing, COPD  · Musculoskeletal  o Admits  o : back pain, knee pain  · Endocrine  o Admits  o : cold intolerance, heat intolerance, hot flashes  o Denies  o : thyroid disease, diabetes, heat or cold intolerance, excessive thirst or urination  · Psychiatric  o Admits  o : anxiety, depression  o Denies  o : suicidal ideation, homicidal ideation  · Allergic-Immunologic  o Denies  o : frequent illnesses      Vitals  Date Time BP Position Site L\R Cuff Size HR RR TEMP (F) WT  HT  BMI kg/m2 BSA m2 O2 Sat        06/05/2019 10:09 /96 Sitting    113 - R  97.6 203lbs 7oz    96 %          Physical Examination  · Constitutional  o Appearance  o : well developed, well-nourished, in no acute distress  · Head and Face  o HEENT  o : Unremarkable  · Eyes  o Conjunctivae  o : conjunctivae normal  · Neck  o Inspection/Palpation  o : supple  o Thyroid  o : no thyromegaly  · Respiratory  o Respiratory Effort  o : breathing unlabored  o Auscultation of Lungs  o : clear to ascultation  · Cardiovascular  o Heart  o :   § Auscultation of Heart  § : regular rate and rhythm  o Peripheral Vascular System  o :   § Extremities  § : no edema  · Gastrointestinal  o Abdominal Examination  o :   § Abdomen  § : soft   · Lymphatic  o Neck  o : no lymphadenopathy present  · Musculoskeletal  o General  o :   § General Musculoskeletal  § : (+) chronic LBP and then the right knee with crepitus and left knee also but left also  with popping   · Skin and Subcutaneous Tissue  o General Inspection  o : skin turgor normal, texture normal  · Neurologic  o Gait and Station  o :   § Gait Screening  § : normal gait  · Psychiatric  o Mood and Affect  o : mood normal, affect appropriate          Assessment  · Fatigue     780.79/R53.83  · Vitamin D deficiency     268.9/E55.9  · DDD (degenerative disc disease), lumbar     722.52/M51.36  · Right knee pain     719.46/M25.561  · Elevated ferritin     790.6/R79.89  · Hot flashes     782.62/R23.2  · Grief reaction     309.0/F43.21  · Hemochromatosis     275.03/E83.119  · Anxiety with depression     300.4/F41.8      Plan  · Orders  o CBC with Auto Diff [facility] (95555) - 780.79/R53.83 - 06/05/2019  o CMP [facility] (92946) - 780.79/R53.83 - 06/05/2019  o Thyroid Profile (THYII) - 780.79/R53.83 - 06/05/2019  o Iron panel (iron, TIBC, transferrin saturation) (96591, 24825, 59802) - 780.79/R53.83 - 06/05/2019  o B12 Folate levels (B12FO) - 780.79/R53.83 - 06/05/2019  o Vitamin D Level (26176) - 268.9/E55.9 - 06/05/2019  o ACO-39: Current medications updated and reviewed () - - 06/05/2019  o Ferritin ser/plas (47403) - 790.6/R79.89, 780.79/R53.83 - 06/05/2019  o FSH (follicle stimulating hormone) (06255) - 780.79/R53.83, 782.62/R23.2 - 06/05/2019  o LH (luteinizing hormone) (93899) - 780.79/R53.83, 782.62/R23.2 - 06/05/2019  o Estrogen (Total) (68690) - 780.79/R53.83, 782.62/R23.2 - 06/05/2019  · Medications  o etodolac 400 mg oral tablet   SIG: TAKE ONE TABLET BY MOUTH THREE TIMES DAILY AS NEEDED FOR PAIN   DISP: (90) Tablet with 5 refills  Adjusted on 06/05/2019     · Instructions  o Recheck Vitamin D blood level in 8-12 weeks.  o Take all medications as prescribed/directed.  o Patient was educated/instructed on their diagnosis, treatment and medications prior to discharge from the clinic today.  o Patient instructed to seek medical attention urgently for new or worsening symptoms.  o Call the office with any  concerns or questions.  o Chronic conditions reviewed and taken into consideration for today's treatment plan.  · Disposition  o Call or Return if symptoms worsen or persist.     ****MAIL ALL LABS TO PT*****    and call pt should the ferritin be elevated--and then pt would need to call the hematology office for another blood draw             Electronically Signed by: BRANDO Mott -Author on June 5, 2019 10:45:01 AM

## 2021-05-07 NOTE — PROGRESS NOTES
Progress Note      Patient Name: Holly Morin   Patient ID: [id number]   Sex: Female   YOB: 1970        Visit Date: January 29, 2018    Provider: BRANDO Mott   Location: [facility]   Location Address: [address] [place] [address]  [id number]   Location Phone: [phone]          Chief Complaint     fup to knot on collar bone, she has finished antibiotic       History Of Present Illness  Holly Morin is a 47 year old /White female who presents for evaluation and treatment of:      F/U on the swollen inner left clavicle area--and not tender and no change and pt was seen by DR Don and they did Keflex Dec--and then she F/U with me here for this and was improving and still small amt swelled and then DR White has postponed total knee due to this--    then sleeping pattern worse--already on the 50 mg of the elavil       Past Medical History  Disease Name Date Onset Notes   Abdominal pain, RUQ --  --    Anxiety --  --    Arthritis --  --    Bloating symptom --  --    Change in stool --  --    Colitis --  --    Colon Cancer --  --    Depression --  --    GERD (gastroesophageal reflux disease) --  --    Irritable Bowel Syndrome --  --    Seasonal allergies --  --          Past Surgical History  Procedure Name Date Notes   Cholecstectomy --  --    Cholecystecomy --  --    Gallbladder --  --    Hysterectomy --  --    Knee surgery --  --          Medication List  Name Date Started Instructions   amitriptyline 25 mg oral tablet 01/18/2018 take 1-2 tablets at bedtime as needed for the insomnia   buspirone 5 mg oral tablet  take 1 tablet (5 mg) by oral route 3 times per day   etodolac 400 mg oral tablet 11/28/2017 TAKE 1 TABLET 3 times daily PRN pain   Linzess 72 mcg oral capsule  take 1 capsule (72 mcg) by oral route once daily on an empty stomach at least 30 minutes before 1st meal of the day   ondansetron HCl 4 mg oral tablet 01/18/2018 take 1  tablet twice daily for nausea   pantoprazole 40 mg oral tablet,delayed release (DR/EC)  take 1 tablet (40 mg) by oral route once daily   Probiotic 10 billion cell oral capsule 01/18/2018 take 1 capsule by oral route QD for antibiotic use   ranitidine HCl 300 mg oral tablet 01/18/2018 take 1 tablet (300 mg) by oral route once daily at bedtime   venlafaxine 150 mg oral tablet extended release 24hr 01/18/2018 take 1 capsule 1daily with food         Allergy List  Allergen Name Date Reaction Notes   PENICILLINS --  --  --    SULFA (SULFONAMIDES) --  --  --          Family Medical History  Disease Name Relative/Age Notes   Alcohol Abuse / Father    Father/    Arthrtis / Father; Mother    Father/     Mother/    Asthma / Sister    Sister/    Cardiac Conduction Disorder / --    Depression / Father; Mother    Father/     Mother/    Diabetes mellitus, type II / --    DM Type II / Mother    Mother/    Hypertension / Father    Father/    Lung cancer / --    Mother, Grandmother, or Sister developed Heart Disease before the age of 65 / --    Osteoporosis / Mother    Mother/          Social History  Finding Status Start/Stop Quantity Notes   Alcohol Never --/-- --  never drinks alcohol   Caffeine --  --/-- --  --    Exercises regularly --  --/-- --  0 times per week    --  --/-- --  --    Recreational Drug Use Never --/-- --  never used   Tobacco Former --/-- 2 PPD former smoker, smokes 1 to 2 packs per day, for 10 years, never uses other tobacco products   Uses seatbelts --  --/-- --  yes         Review of Systems  · Constitutional  o Admits  o : good general health lately  o Denies  o : fatigue, recent weight changes  · Eyes  o Denies  o : blurred or double vision  · HENT  o Denies  o : hearing loss or ringing, chronic sinus problem, swollen glands in neck  · Cardiovascular  o Denies  o : chest pain, palpitations (fast, fluttering, or skipping beats), swelling (feet, ankles, hands), shortness of breath while walking or  "lying flat  · Respiratory  o Denies  o : shortness of breath, asthma or wheezing, COPD  · Gastrointestinal  o Denies  o : ulcers, nausea or vomiting  · Neurologic  o Denies  o : lightheaded or dizzy, stroke, headaches  · Musculoskeletal  o Denies  o : joint pain, back pain  · Endocrine  o Denies  o : thyroid disease, diabetes, heat or cold intolerance, excessive thirst or urination  · Psychiatric  o Admits  o : depression, difficulty sleeping  o Denies  o : suicidal ideation, homicidal ideation  · Heme-Lymph  o Admits  o : (+) swelled area to the left inner clavicle--and tender and into the upper supraclavicular area  o Denies  o : bleeding or bruising tendency, anemia      Vitals  Date Time BP Position Site L\R Cuff Size HR RR TEMP(F) WT  HT  BMI kg/m2 BSA m2 O2 Sat HC       01/29/2018 10:06 /74 Sitting    120 - R  98 176lbs 8oz 5'  6.25\" 28.27 1.93 99 %           Physical Examination  · Constitutional  o Appearance  o : well developed, well-nourished, in no acute distress  · Eyes  o Conjunctivae  o : conjunctivae normal  · Neck  o Inspection/Palpation  o : supple  o Thyroid  o : no thyromegaly  · Respiratory  o Respiratory Effort  o : breathing unlabored  o Auscultation of Lungs  o : clear to ascultation  · Cardiovascular  o Heart  o :   § Auscultation of Heart  § : regular rate and rhythm  o Peripheral Vascular System  o :   § Extremities  § : no edema  · Lymphatic  o Neck  o : no lymphadenopathy present--except the area tot he left inner clavicle area swelled small amt--and not any bigger--but pt also tender in the supraclavicular are as well   · Musculoskeletal  o General  o :   § General Musculoskeletal  § : No joint swelling or deformity., Muscle tone, strength, and development grossly normal.  · Skin and Subcutaneous Tissue  o General Inspection  o : no lesions present, no areas of discoloration, skin turgor normal, texture normal  · Neurologic  o Gait and Station  o :   § Gait Screening  § : normal " gait  · Psychiatric  o Mood and Affect  o : mood normal, affect appropriate              Assessment  · Insomnia, unspecified     780.52/G47.00  · Lymphadenopathy, supraclavicular     785.6/R59.0  · Depression     311/F32.9      Plan  · Orders  o IOP - CBC (51524) - 785.6/R59.0 - 01/29/2018  o ACO-39: Current medications updated and reviewed () - - 01/29/2018  o Xray chest 2 views [facility] ([id number]) - 785.6/R59.0 - 01/29/2018   pt has had this for approx. 2 months-DR Don did one round antibiotics and then vastly smaller and then we repeated 1-2 weeks ago--and no change--and tender--at the left inner clavicle aspect--stat reading--  · Medications  o amitriptyline 75 mg oral tablet   SIG: take 1 tablet (75 mg) by oral route once daily at bedtime   DISP: (30) tablets with 5 refills  Adjusted on 01/29/2018     · Instructions  o Avoid any electronic use for at least 30 minutes prior to bed time. Cell phone screens, tablets and TVs immitate daylight, so your brain can become confused on the time of day. No caffeine use in the late afternoon and evenings.  o Take all medications as prescribed/directed.  o Patient was educated/instructed on their diagnosis, treatment and medications prior to discharge from the clinic today.  o Patient instructed to seek medical attention urgently for new or worsening symptoms.  o Call the office with any concerns or questions.  o Chronic conditions reviewed and taken into consideration for today's treatment plan.  · Disposition  o Call or Return if symptoms worsen or persist.     CBC still normal--WBC=7    will call pt with the results form the CXR-    then the pt needs to F/U with Dr Don for the lymphademopathy--since he saw her in the beginning for this-and sw it at it's worse--and maybe he can give her the clearance needed for the total knee--    the the pt also to let the neurosurgeon know as well at her appoint--    she may still need the CT of chest    addendum: CXR  was normal-pt to F/U with Dr Don with regards to this and called and D/W pt--             Electronically Signed by: BRANDO Mott -Author on January 29, 2018 11:42:41 AM

## 2021-05-07 NOTE — PROGRESS NOTES
Progress Note      Patient Name: Holly Morin   Patient ID: [id number]   Sex: Female   YOB: 1970    Referring Provider: Julissa RON    Visit Date: November 19, 2019    Provider: ASAF Mott   Location: [facility]   Location Address: [address] [place] [address]   [place]  [id number]   Location Phone: [phone]          Chief Complaint     Refills       History Of Present Illness  Holly Morin is a 49 year old /White female who presents for evaluation and treatment of:      trazodone refill--for insomnia--normally stable and reports just the last few nights trouble sleeping  and buspar refill--anxiety stable     mammo appoint at the [facility] diagnostic and pt already has this --and just needs the order to hand carry --but pt admits to tenderness of the breasts bilat     flu shot     (+) smoker--1 PPD--smoked 15 yrs --and reports still wheezes at times--and mentioned we were going to get the PFT       Past Medical History  Disease Name Date Onset Notes   Abdominal pain, RUQ --  --    Anxiety --  --    Arthritis --  --    Bloating symptom --  --    Change in stool --  --    Colitis --  --    Colon Cancer --  --    Depression --  --    DJD (degenerative joint disease) of knee --  --    GERD (gastroesophageal reflux disease) --  --    Irritable bowel syndrome --  --    Seasonal allergies --  --          Past Surgical History  Procedure Name Date Notes   Cholecstectomy --  --    Cholecystecomy --  --    Gallbladder --  --    Hysterectomy --  --    Knee surgery --  --          Medication List  Name Date Started Instructions   baclofen 20 mg oral tablet 11/04/2019 take 1 tablet by oral route 3 times a day as needed for LBP or knee pain   buspirone 30 mg oral tablet 11/19/2019 take 1 tablet (30 mg) by oral route 2 times per day for 30 days   Creon 36,000-114,000- 180,000 unit oral capsule,delayed release(/EC)  take 2 capsules by oral route 3 times  per day with meals and 1 capsule with each snack swallowing whole. Do not crush, chew and/or divide.   nicotine 21 mg/24 hr transdermal patch 24 hour 11/13/2019 apply 1 patch (21 mg) by transdermal route once daily and remove at bedtime for 30 days   ondansetron HCl 4 mg oral tablet 12/23/2019 TAKE ONE TABLET BY MOUTH TWICE DAILY AS NEEDED FOR NAUSEA AND VOMITING   pantoprazole 40 mg oral tablet,delayed release (DR/EC) 08/20/2018 take 1 tablet (40 mg) by oral route once daily   trazodone 100 mg oral tablet 11/19/2019 take 1 tablet (100 mg) by oral route once daily at bedtime for 30 days   venlafaxine 75 mg oral tablet 02/17/2020 take 1 tablet by oral route daily   Vitamin D2 50,000 unit oral capsule 04/11/2019 take 1 capsule (50,000 unit) by oral route once weekly for 30 days         Allergy List  Allergen Name Date Reaction Notes   PENICILLINS --  --  --    SULFA (SULFONAMIDES) --  --  --          Family Medical History  Disease Name Relative/Age Notes   Asthma Sister/   Sister   Depression Father/  Mother/   Father; Mother   DM Type II Mother/   Mother   Cardiac Conduction Disorder  --    Hypertension Father/   Father   Lung cancer  --    Diabetes mellitus, type II  --    Arthrtis Father/  Mother/   Father; Mother   Osteoporosis Mother/   Mother   Alcohol abuse Father/   Father   Mother, Grandmother, or Sister developed Heart Disease before the age of 65  --          Social History  Finding Status Start/Stop Quantity Notes   Alcohol Never --/-- --  never drinks alcohol   Caffeine --  --/-- --  --    Exercises regularly --  --/-- --  0 times per week    --  --/-- --  --    Recreational Drug Use Never --/-- --  never used   Tobacco Current every day --/-- 2 PPD --    Uses seatbelts --  --/-- --  yes         Immunizations  NameDate Admin Mfg Trade Name Lot Number Route Inj VIS Given VIS Publication   Mzepxovmy85/19/2019 [facility] FLUARIX [id number]  11/19/2019    Comments: NDC [id number]   Yzkztkkof97/29/2018  [facility] FLUARIX [id number]   10/29/2018 08/07/2015   Comments: NDC [id number]   Myxgaieaz32/01/2017 UNK Unknown TradeName [id number] NE NE 04/04/2018 08/07/2015   Comments:          Review of Systems  · Constitutional  o Admits  o : fatigue  · HENT  o Denies  o : vertigo, recent head injury  · Breasts  o Admits  o : tenderness  o Denies  o : lumps, swelling, nipple discharge, abnormal changes in breast size, additional breast symptoms except as noted in the HPI  · Cardiovascular  o Denies  o : chest pain, irregular heart beats  · Respiratory  o Admits  o : wheezing, cough  o Denies  o : shortness of breath, productive cough  · Gastrointestinal  o Denies  o : nausea, vomiting  · Genitourinary  o Denies  o : dysuria, urinary retention  · Integument  o Denies  o : hair growth change, new skin lesions      Vitals  Date Time BP Position Site L\R Cuff Size HR RR TEMP (F) WT  HT  BMI kg/m2 BSA m2 O2 Sat        11/19/2019 04:55 /80 Sitting    109 - R  97.4 174lbs 16oz    97 %          Physical Examination  · Constitutional  o Appearance  o : well developed, well-nourished, in no acute distress  · Head and Face  o HEENT  o : Unremarkable  · Eyes  o Conjunctivae  o : conjunctivae normal  · Neck  o Inspection/Palpation  o : supple  o Thyroid  o : no thyromegaly  · Respiratory  o Respiratory Effort  o : breathing unlabored  o Auscultation of Lungs  o : clear to ascultation  · Cardiovascular  o Heart  o :   § Auscultation of Heart  § : regular rate and rhythm  o Peripheral Vascular System  o :   § Extremities  § : no edema  · Breasts  o Inspection of Breasts  o : breasts symmetrical, no skin changes, no discharge present, no deformities present--but bilat breasts at the lateral aspects very tender and grainy dense tissue palpated   · Gastrointestinal  o Abdominal Examination  o :   § Abdomen  § : bowel sounds present, non-distended, non-tender  · Lymphatic  o Neck  o : no lymphadenopathy  present  · Musculoskeletal  o General  o :   § General Musculoskeletal  § : No joint swelling or deformity., Muscle tone, strength, and development grossly normal.  · Skin and Subcutaneous Tissue  o General Inspection  o : skin turgor normal, texture normal  · Neurologic  o Gait and Station  o :   § Gait Screening  § : normal gait  · Psychiatric  o Mood and Affect  o : mood normal, affect appropriate              Assessment  · Need for influenza vaccination     V04.81/Z23  · Anxiety disorder     300.00/F41.9  · Insomnia, unspecified     780.52/G47.00  · Wheeze     786.07/R06.2  · Breast tenderness in female     611.71/N64.4  · Dense breasts     793.82/R92.2    Problems Reconciled  Plan  · Orders  o Immunization Admin Fee (2+ Injections) ([facility]) (92816) - V04.81/Z23 - 11/19/2019  o ACO-39: Current medications updated and reviewed () - - 11/19/2019  o Fluzone Quadrivalent Vaccine, age 3+ (96472) - V04.81/Z23 - 11/19/2019   Vaccine - Influenza; Dose: 0.5; Site: Right Upper Arm; Route: Intramuscular; Date: 11/19/2019 16:58:00; Exp: 06/30/2020; Lot: [id number]; Mfg: [employer]; TradeName: FLUARIX; Administered By: Yoana Tim Other; Comment: NDC [id number] EG  o PFT Basic [facility] (17740) - 786.07/R06.2 - 11/19/2019   smoked 15 yrs and 1 PPD   o Diagnostic mammogram 2D breast bilateral (w/US if needed) [facility]. (37831, ) - 611.71/N64.4, 793.82/R92.2 - 11/19/2019   bilat breast to the outer lateral aspects with very grainy tender tissue palpated   · Medications  o Ventolin HFA 90 mcg/actuation inhalation HFA aerosol inhaler   SIG: inhale 1 - 2 puffs (90 - 180 mcg) by inhalation route every 6 hours as needed for 30 days   DISP: (1) 8 gm canister with 0 refills  Prescribed on 11/19/2019     o buspirone 30 mg oral tablet   SIG: take 1 tablet (30 mg) by oral route 2 times per day for 30 days   DISP: (60) tablets with 2 refills  Adjusted on 11/19/2019     o trazodone 100 mg oral tablet   SIG: take 1 tablet (100 mg) by oral  "route once daily at bedtime for 30 days   DISP: (30) tablets with 2 refills  Adjusted on 11/19/2019     o Medications have been Reconciled  o Transition of Care or Provider Policy  · Instructions  o Discussed the need for therapy, either with a certified counselor, psychologist, and/or family . If no improvement is noted or worsening of their condition, return to office or ER. But also discussed with patient that if they are non-responsive to the type of medication they may need to see a psychaitrist for further evaluation and management.   o Patient was given an SSRI/SSNRI medication and warned of possible side effects of the medication including potential for increase risk of sucicidal thoughts and feelings. Patient was instructed that if they begin to exhibit any of these effects they will discontinue the medication immediately and contact our office or the ER ASAP.   o Patient agrees to a \"No Self Harm\" contract. Patient will either call , [facility], ER, [facility], [facility].  o Avoid any electronic use for at least 30 minutes prior to bed time. Cell phone screens, tablets and TVs immitate daylight, so your brain can become confused on the time of day. No caffeine use in the late afternoon and evenings.  o Patient was educated/instructed on their diagnosis, treatment and medications prior to discharge from the clinic today.  o Patient instructed to seek medical attention urgently for new or worsening symptoms.  o Call the office with any concerns or questions.  o Chronic conditions reviewed and taken into consideration for today's treatment plan.  · Disposition  o Call or Return if symptoms worsen or persist.  o Return Visit Request in/on 3 months +/- 2 days (0891).            Electronically Signed by: ASAF Mott -Author on March 10, 2020 02:10:05 PM  "

## 2021-05-07 NOTE — PROGRESS NOTES
"   Progress Note      Patient Name: Holly Morin   Patient ID: [id number]   Sex: Female   YOB: 1970        Visit Date: April 10, 2019    Provider: BRANDO Mott   Location: [facility]   Location Address: [address] [place] [address]  [id number]   Location Phone: [phone]          Chief Complaint     Follow up for medicines and vitamin D       History Of Present Illness  Holly Morin is a 48 year old /White female who presents for evaluation and treatment of:      1)  states she's \"mad at the world--and grouchy and I'm running out of the behind end back here\"--    since her father passed in Dec 2018-moodiness--and irritability worsened--and no SI no HI--and then pt admits unable to control emotions very well--and easily overwhelmed     currently on Effexor XR 75 mg 3 tabs daily--and pt states not working at all and even getting up at night and eating and weight gain--and up in the middle of the night cooking meals at 3 am--    then the trazodone--75 mg at HS--and stopped working since her father passed--and then pt increased the past 2 nights to the full 100 mg and actually slept--and no improvement with the moods--    then also on the Buspar for the anxiety--and still taking it TID and still having very bad anxiety and crying and overwhelmed--    pt was stable on her meds--prior to Dec and admits all these s/s started after the loss of her father--    tried Prozac in the past and made her very \"mouthy\"-, Celexa and used it in 2000 and pt thinks it worked--Paxil and cannot recall response --Wellbutrin also in the past--    back in 2000--at first was seeing DR Tran and was placed on the Celexa and then Paxil    and then admits the other day was on face book and told someone off over raccoons bein gin the other persons garbage     2) also her back--did not go to the last 2 PT due tot he increased pain--and the sciatica is flared up and " then the xrays did show DDD --and now with the sudden sensation of being stuck with a pin in the lower legs--bilat tops of the feet also if standing the burning tingling sensation     3) pt also sees the hematologist and they are the doing phlebotomy on her-and the last time--there was told the ferritin needs to be <200 and they did one phlebotomy and it was at 256 and then they were redrawing her and was told the machine was down and would take at 1 hr and then few min later told by another person that she was good at 175--and can leave--and they were confused--like just told her something to have her leave--and they were very dissatisfied and want her levels drawn today     4) Vit D level    and pt only goes to the hematologist Q 3 months and sees Lawanda diaz>       Past Medical History  Disease Name Date Onset Notes   Abdominal pain, RUQ --  --    Anxiety --  --    Arthritis --  --    Bloating symptom --  --    Change in stool --  --    Colitis --  --    Colon Cancer --  --    Depression --  --    DJD (degenerative joint disease) of knee --  --    GERD (gastroesophageal reflux disease) --  --    Irritable Bowel Syndrome --  --    Seasonal allergies --  --          Past Surgical History  Procedure Name Date Notes   Cholecstectomy --  --    Cholecystecomy --  --    Gallbladder --  --    Hysterectomy --  --    Knee surgery --  --          Medication List  Name Date Started Instructions   baclofen 20 mg oral tablet 02/05/2019 take 1 tablet by oral route 3 times a day as needed for LBP or knee pain   buspirone 30 mg oral tablet 04/10/2019 take 1 tablet (30 mg) by oral route 2 times per day for 30 days   etodolac 400 mg oral tablet 02/05/2019 TAKE ONE TABLET BY MOUTH THREE TIMES DAILY AS NEEDED FOR PAIN   Linzess 145 mcg oral capsule 08/20/2018 take 1 capsule (145 mcg) by oral route once daily on an empty stomach at least 30 minutes before 1st meal of the day   pantoprazole 40 mg oral tablet,delayed release (DR/EC)  08/20/2018 take 1 tablet (40 mg) by oral route once daily   ranitidine HCl 300 mg oral tablet 08/20/2018 take 1 tablet (300 mg) by oral route once daily at bedtime for 30 days   trazodone 100 mg oral tablet 04/10/2019 take 1 tablet (100 mg) by oral route once daily at bedtime   venlafaxine 150 mg oral capsule,extended release 24hr 04/10/2019 take 1 capsule (150 mg) by oral route once daily   Vitamin D2 50,000 unit oral capsule 01/11/2019 take 1 capsule (50,000 unit) by oral route once weekly for 30 days         Allergy List  Allergen Name Date Reaction Notes   PENICILLINS --  --  --    SULFA (SULFONAMIDES) --  --  --          Family Medical History  Disease Name Relative/Age Notes   Asthma Sister/   Sister   Depression Father/  Mother/   Father; Mother   DM Type II Mother/   Mother   Cardiac Conduction Disorder  --    Hypertension Father/   Father   Lung cancer  --    Diabetes mellitus, type II  --    Arthrtis Father/  Mother/   Father; Mother   Osteoporosis Mother/   Mother   Alcohol Abuse Father/   Father   Mother, Grandmother, or Sister developed Heart Disease before the age of 65  --          Social History  Finding Status Start/Stop Quantity Notes   Alcohol Never --/-- --  never drinks alcohol   Caffeine --  --/-- --  --    Exercises regularly --  --/-- --  0 times per week    --  --/-- --  --    Recreational Drug Use Never --/-- --  never used   Tobacco Former --/-- 2 PPD quit in december   Uses seatbelts --  --/-- --  yes         Immunizations  NameDate Admin Mfg Trade Name Lot Number Route Inj VIS Given VIS Publication   Wqjttpmtl93/29/2018 [facility] FLUARIX [id number]  10/29/2018 08/07/2015   Comments: NDC [id number]   Hwcgzcbgq03/01/2017 UNK Unknown TradeName [id number] NE NE 04/04/2018 08/07/2015   Comments:          Review of Systems  · Constitutional  o Admits  o : fatigue, weight gain  · HENT  o Denies  o : vertigo, recent head injury  · Cardiovascular  o Denies  o : chest pain, irregular heart  "beats  · Respiratory  o Denies  o : shortness of breath, productive cough  · Gastrointestinal  o Denies  o : nausea, vomiting  · Neurologic  o Admits  o : tingling or numbness  o Denies  o : altered mental status, seizures  · Musculoskeletal  o Admits  o : joint pain, additional musculoskeletal symptoms except as noted in the HPI  o Denies  o : back pain  · Endocrine  o Denies  o : cold intolerance, heat intolerance  · Psychiatric  o Admits  o : anxiety, depression, difficulty sleeping  o Denies  o : suicidal ideation, homicidal ideation  · Heme-Lymph  o Denies  o : petechiae, lymph node enlargement or tenderness  · Allergic-Immunologic  o Denies  o : frequent illnesses      Vitals  Date Time BP Position Site L\R Cuff Size HR RR TEMP (F) WT  HT  BMI kg/m2 BSA m2 O2 Sat HC       04/10/2019 02:53 /86 Sitting    93 - R  97.8 203lbs 0oz 5'  6.25\" 32.52 2.07 96 %          Physical Examination  · Constitutional  o Appearance  o : well developed, well-nourished, in no acute distress  · Head and Face  o HEENT  o : Unremarkable  · Eyes  o Conjunctivae  o : conjunctivae normal  · Neck  o Inspection/Palpation  o : supple  o Thyroid  o : no thyromegaly  · Respiratory  o Respiratory Effort  o : breathing unlabored  · Cardiovascular  o Heart  o :   § Auscultation of Heart  § : no edema  o Peripheral Vascular System  o :   § Extremities  § : no edema  · Gastrointestinal  o Abdominal Examination  o :   § Abdomen  § : soft  · Lymphatic  o Neck  o : no lymphadenopathy present  · Musculoskeletal  o General  o :   § General Musculoskeletal  § : (+) point tenderness to the left and mid lower L-spine--and to the upper SI area of the left buttock.--and radiates to the BLE and to the tops of the feet there is abnormal sensation of burning and tingling   · Skin and Subcutaneous Tissue  o General Inspection  o : skin turgor normal, texture normal  · Neurologic  o Gait and Station  o :   § Gait Screening  § : normal " gait  · Psychiatric  o Mood and Affect  o : mood normal, affect appropriate--very tearful at times--and  with her as well           Results     reviewed the xrays and prior labs       Assessment  · Vitamin D deficiency     268.9/E55.9  · Anxiety     300.02/F41.1  · Depression     311/F32.9  · 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  · Insomnia     780.52/G47.00  · DDD (degenerative disc disease), lumbar     722.52/M51.36  · Lumbar radicular pain     724.4/M54.16  · Elevated ferritin     790.6/R79.89  · Hemochromatosis     275.03/E83.119      Plan  · Orders  o Vitamin D Level (79688) - 268.9/E55.9 - 04/10/2019  o ACO-18: Depression screening not completed due to current diagnosis of depression or bipolar disorder () - - 04/10/2019  o ACO-39: Current medications updated and reviewed () - - 04/10/2019  o MRI lumbar spine w/o contrast (42863) - 722.52/M51.36, 724.4/M54.16 - 04/10/2019   prior xrays shows DDD and pt attempted PT and pain worsen and now worsening s/s   o 4.00 - Toradol Injection 60mg (-0) - 722.52/M51.36, 724.4/M54.16 - 04/10/2019   -DK 12-1-19 2704-4706-59  o IM - Injection Fee (81037) - 722.52/M51.36, 724.4/M54.16 - 04/10/2019  o Ferritin ser/plas (99752) - 790.6/R79.89 - 04/10/2019  o Iron + TIBC ser (05665, 14778) - 790.6/R79.89 - 04/10/2019  o PSYCHIATRY CONSULTATION (PSYCH) - 309.1/F43.29, 300.02/F41.1, 311/F32.9, 780.52/G47.00 - 04/10/2019   pt was given the list of names and numbers  · Medications  o Medrol (Behzad) 4 mg oral tablets,dose pack   SIG: take as directed for 6 days   DISP: (1) 21 ct dose-pack with 0 refills  Prescribed on 04/10/2019     o buspirone 30 mg oral tablet   SIG: take 1 tablet (30 mg) by oral route 2 times per day for 30 days   DISP: (60) tablets with 5 refills  Adjusted on 04/10/2019     o trazodone 100 mg oral tablet   SIG: take 1 tablet (100 mg) by oral route once daily at bedtime   DISP: (30) tablets with 5 refills  Adjusted on 04/10/2019      o venlafaxine 150 mg oral capsule,extended release 24hr   SIG: take 1 capsule (150 mg) by oral route once daily   DISP: (30) capsules with 5 refills  Adjusted on 04/10/2019     · Instructions  o Recheck Vitamin D blood level in 8-12 weeks.  o Take all medications as prescribed/directed.  o Patient was educated/instructed on their diagnosis, treatment and medications prior to discharge from the clinic today.  o Patient instructed to seek medical attention urgently for new or worsening symptoms.  o Call the office with any concerns or questions.  o Minutes spent with patient including greater than 50% in Education/Counseling/Care Coordination.  o Time spent with the patient was minutes, more than 50% face to face.  o Time In: 305  o Time Out: 415  o Risks, benefits, and alternatives were discussed with the patient. The patient is aware of risks associated with: the depression and anxiety and the med Tx--  o Chronic conditions reviewed and taken into consideration for today's treatment plan.  · Disposition  o Call or Return if symptoms worsen or persist.     urged pt to see psych and then the pt to also do the counseling as well--pt finally seemed open to this at this time             Electronically Signed by: BRANDO Mott -Author on April 10, 2019 04:27:42 PM

## 2021-05-07 NOTE — PROGRESS NOTES
Progress Note      Patient Name: Holly Morin   Patient ID: [id number]   Sex: Female   YOB: 1970        Visit Date: January 17, 2018    Provider: BRANDO Mott   Location: [facility]   Location Address: [address] [place] [address]  [id number]   Location Phone: [phone]          Chief Complaint     patient here to discuss swollen collar bone       History Of Present Illness  Holly Morin is a 47 year old /White female who presents for evaluation and treatment of:      patient here to discuss swollen collar bone  pt thinks noticed it during the holidays--BC she recalls showing her mother--and pt recalls getting her all the top teeth pulled just prior to that due to all her teeth breaking off--and did have infection in the gums of one of the teeth--and no antibiotics were given--only gave her Norco for pain--per pt and her  report-    then the pt states DR Dover thinks the tooth breakage may be due to the elevated ferritin levels--just saw him on 1/11/18--then she showed him her swelled area--and he thinks is due to infection so placed her on clindamycin 300 mg TID x 1 week--for this-and then was instructed to F/U with PCP on this-and due to F/U with him on 2/8/18    then pt was also seeing DR White with regards to her right knee--pt was going for a total knee replacement--and pt was even scheduled for this--and Dr White canceled for now--until clearance from PCP with regards to the left supraclavicular lymphadenopathy (Dr White told pt never seen anyone at her age with the DJD OA of a 58 y/o)--per pt report     and pt now recalled the whole left side of her neck in the front--where the lymph are was hurting as well--prior to the antibiotics started--    pt states the area was the size of a large egg and was red--    and now much smaller and no redness now--but still sore--    and pt also needs refills on the: Elavil, zantac,effexor,  Zofran--doing well--    also seeing More Reddy NP--with DR Guicho Santiago--this is for the lumbar and thorasic stenosis    pt also requesting a handicap sticker--if possible--due to the severe pain to the right knee--       Past Medical History  Disease Name Date Onset Notes   Abdominal pain, RUQ --  --    Anxiety --  --    Arthritis --  --    Bloating symptom --  --    Change in stool --  --    Colitis --  --    Colon Cancer --  --    Depression --  --    GERD (gastroesophageal reflux disease) --  --    Irritable Bowel Syndrome --  --    Seasonal allergies --  --          Past Surgical History  Procedure Name Date Notes   Cholecstectomy --  --    Cholecystecomy --  --    Gallbladder --  --    Hysterectomy --  --    Knee surgery --  --          Medication List  Name Date Started Instructions   amitriptyline 25 mg oral tablet  take 1-2 tablets at bedtime as needed for the insomnia   buspirone 5 mg oral tablet  take 1 tablet (5 mg) by oral route 3 times per day   clindamycin HCl 300 mg oral capsule  take 1 capsule by mouth every 8 hours   etodolac 400 mg oral tablet 11/28/2017 TAKE 1 TABLET 3 times daily PRN pain   ondansetron HCl 4 mg oral tablet  take 1 tablet twice daily for nausea   pantoprazole 40 mg oral tablet,delayed release (DR/EC)  take 1 tablet (40 mg) by oral route once daily   ranitidine HCl 300 mg oral tablet  take 1 tablet (300 mg) by oral route once daily at bedtime   venlafaxine 150 mg oral tablet extended release 24hr  take 1 capsule 1daily with food         Allergy List  Allergen Name Date Reaction Notes   PENICILLINS --  --  --          Family Medical History  Disease Name Relative/Age Notes   Alcohol Abuse / Father    Father/    Arthrtis / Father; Mother    Father/     Mother/    Asthma / Sister    Sister/    Cardiac Conduction Disorder / --    Depression / Father; Mother    Father/     Mother/    Diabetes mellitus, type II / --    DM Type II / Mother    Mother/    Hypertension / Father    Father/     Lung cancer / --    Mother, Grandmother, or Sister developed Heart Disease before the age of 65 / --    Osteoporosis / Mother    Mother/          Social History  Finding Status Start/Stop Quantity Notes   Alcohol Never --/-- --  never drinks alcohol   Caffeine --  --/-- --  --    Exercises regularly --  --/-- --  0 times per week   Former smoker --  --/-- --  --     --  --/-- --  --    Recreational Drug Use Never --/-- --  never used   Tobacco Former --/-- 2 PPD former smoker, smokes 1 to 2 packs per day, for 10 years, never uses other tobacco products   Uses seatbelts --  --/-- --  yes         Review of Systems  · Constitutional  o Admits  o : chills  o Denies  o : fever  · Eyes  o Admits  o : reviewed and unchanged  · HENT  o Admits  o : additional HENT symptoms except as noted in the HPI, swollen glands in neck  · Cardiovascular  o Denies  o : chest pain, irregular heart beats  · Respiratory  o Denies  o : shortness of breath, wheezing, cough  · Gastrointestinal  o Admits  o : nausea, vomiting, heartburn, reflux  · Genitourinary  o Denies  o : dysuria, urinary retention  · Integument  o Denies  o : hair growth change, new skin lesions  · Neurologic  o Denies  o : altered mental status, seizures  · Musculoskeletal  o Admits  o : joint pain, knee pain  o Denies  o : shoulder pain  · Endocrine  o Denies  o : cold intolerance, heat intolerance  · Psychiatric  o Admits  o : anxiety, depression, difficulty sleeping  o Denies  o : suicidal ideation, homicidal ideation  · Heme-Lymph  o Admits  o : lymph node enlargement or tenderness  o Denies  o : petechiae  · Allergic-Immunologic  o Denies  o : frequent illnesses      Vitals  Date Time BP Position Site L\R Cuff Size HR RR TEMP(F) WT  HT  BMI kg/m2 BSA m2 O2 Sat HC       01/17/2018 11:01 /96 Sitting    117 - R  97.9 177lbs 0oz    98 %           Physical Examination  · Constitutional  o Appearance  o : well developed, well-nourished, no acute  distress  · Head and Face  o Face  o :   § Inspection  § : pt wears glasses now  · Ears, Nose, Mouth and Throat  o Ears  o :   § External Ears  § : external auditory canal appearance normal, no discharge present  § Otoscopic Examination  § : tympanic membranes pearly white/gray bilaterally  o Nose  o :   § External Nose  § : no lesions noted  § Intranasal Exam  § : nasal mucosa light pink, no intranasal lesions present, nares patent  § Nasopharynx  § : no discharge present  o Oral Cavity  o :   § Oral Mucosa  § : oral mucosa light pink  § Gums  § : gums pink, non-swollen, no bleeding present--and all upper teeth been pulled--  o Throat  o :   § Oropharynx  § : tonsils without exudate, no palatal petechiae  · Neck  o Inspection/Palpation  o : normal appearance, no masses or tenderness, trachea midline  o Thyroid  o : gland size normal, nontender, no nodules or masses present on palpation  · Respiratory  o Respiratory Effort  o : breathing unlabored  o Inspection of Chest  o : chest rise symmetric bilaterally  o Auscultation of Lungs  o : clear to auscultation bilaterally throughout inspiration and expiration  · Cardiovascular  o Heart  o :   § Auscultation of Heart  § : regular rate and rhythm, no murmurs, gallops or rubs  o Peripheral Vascular System  o :   § Extremities  § : no edema  · Lymphatic  o Neck  o : no cervical lymphadenopathy, no supraclavicular lymphadenopathy  o Supraclavicular Nodes  o : small quarter sized left lymph noted--vastly smaller in comparison to how the pt explained was size of large egg--and area is --  · Psychiatric  o Mood and Affect  o : mood normal, affect appropriate     GI: soft     MS: (+) small crepitus to the left knee with ROM--then very prominent crepitus and pain with AROM and PROM of right knee--               Assessment  · GERD (gastroesophageal reflux disease)     530.81/K21.9  · Depression with anxiety     300.4/F41.8  · Insomnia     780.52/G47.00  · Tooth  infection     522.4/K04.7  · Supraclavicular lymphadenopathy     785.6/R59.0  · DJD (degenerative joint disease) of knee     715.36/M17.10  · Nausea & vomiting     787.01/R11.2      Plan  · Orders  o IOP - CBC (21194) - 522.4/K04.7, 785.6/R59.0 - 01/17/2018  o ACO-39: Current medications updated and reviewed () - - 01/17/2018  o ACO-18: Screening for clinical depression not completed due to current diagnosis of depression or bipolar disorder () - 300.4/F41.8 - 01/17/2018  · Medications  o Probiotic 10 billion cell oral capsule   SIG: take 1 capsule by oral route QD for antibiotic use   DISP: (10) capsules with 0 refills  Prescribed on 01/17/2018     o amitriptyline 25 mg oral tablet   SIG: take 1-2 tablets at bedtime as needed for the insomnia   DISP: (60) tablet with 5 refills  Adjusted on 01/17/2018     o clindamycin HCl 300 mg oral capsule   SIG: take 1 capsule by oral route 3 times a day for 7 days   DISP: (21) capsules with 0 refills  Adjusted on 01/17/2018     o ondansetron HCl 4 mg oral tablet   SIG: take 1 tablet twice daily for nausea   DISP: (60) tablet with 5 refills  Adjusted on 01/17/2018     o ranitidine HCl 300 mg oral tablet   SIG: take 1 tablet (300 mg) by oral route once daily at bedtime   DISP: (30) tablet with 5 refills  Adjusted on 01/17/2018     o venlafaxine 150 mg oral tablet extended release 24hr   SIG: take 1 capsule 1daily with food   DISP: (30) tablet with 5 refills  Adjusted on 01/17/2018     · Instructions  o Maintain a healthy weight. Avoid tight fitting clothes. Avoid fried, fatty foods, tomato sauce, chocolate, mint, garlic, onion, alcohol. caffeine. Eat smaller meals, dont lie down after a meal, dont smoke. Elevate the head of your bed 6-9 inches.  o Take all medications as prescribed/directed.  o Rest. Increase Fluids.  o Patient was educated/instructed on their diagnosis, treatment and medications prior to discharge from the clinic today.  o Patient instructed to seek  medical attention urgently for new or worsening symptoms.  o Call the office with any concerns or questions.  o Chronic conditions reviewed and taken into consideration for today's treatment plan.  · Disposition  o Call or Return if symptoms worsen or persist.  o Return Visit Request in/on 1 week +/- 2 days (5061).     will do the 3 month temporary handicap placard for the pt --since is pending the right knee replacement--    then if pt is needing a permanent one--would need to D/W ortho and or her neurosurgeon--    doing the CBC today--then also will do 1 week longer of the clindamycin--since so responsive to the 1 week -in reduction of size and redness--    then once completed pt should be ok to RTO and be re-eval'd and then given okay to proceed with surgery-    CBC normal today             Electronically Signed by: BRANDO Mott -Author on January 17, 2018 11:58:45 AM

## 2021-05-07 NOTE — PROGRESS NOTES
"   Progress Note      Patient Name: Holly Morin   Patient ID: [id number]   Sex: Female   YOB: 1970    Primary Care Provider: Julissa RON   Referring Provider: Julissa RON    Visit Date: February 24, 2021    Provider: ASAF Lema   [place] [facility] [place]   Location Address: [address] [place] [address]   [place]  [id number]   Location Phone: [phone]          Chief Complaint     flare up on sciatica, LT side pain going down leg   refill selenium sulfide       History Of Present Illness  Holly Morin is a 50 year old /White female who presents for evaluation and treatment of:      follow up - patient requested to see me today instead of ASAF Diaz d/t her  having an appointment with me today -    She is having a flare of her back pain -states she has DDD and bulging discs and she has a lot of problems with her sciatic nerve - she is nerve all the way down her LLE - she cannot wiggle her toes or move her ankle - she has been babying the left side by relying on the right side to get her around and now she has it hurting and inflammed.     She is know to Dr. Santiago - has not seen him in a year - was going to [facility] in [facility] but stopped going there about 2 years ago - [employer] quit paying for her to go.     No loss of bowel or bladder. She has a lot of pressure in her back; constantly hurting. She used to take etodolac but her  told her that while she was sleeping she was jerking so she stopped taking it. He states \"she was jerking around so bad it was shaking the whole bed\" - She has not tried other NSAIDs that she can recall - records show Mobic was RX but she cannot remember it. She has gotten steroid shots and those help 'with the every day back pain, but not really the sciatic nerve'.     She needs a refill on the refill selenium sulfide - uses it for \"tinea\" of her breasts - she uses it " "mainly in the summer time and then once per month to keep it away.    She wants her ferritin checked - states she has trouble with it going high - has hemachromotosis. She has phelbotomy PRN - last was 6-8 months ago - hematology wants it under 200 - NIK Parra in [facility].    PHQ-9 score is 19 - she states she feels depressed all of the time - cries a lot and cannot sleep - she used to take Trazodone but weaned off of it over the summer d/t she was trying to figure out what was messing with her stomach - she seemed to do better when taking it. She was taking Buspar as well but stopped it and does not want to go back on it despite \"worrying a lot\" - she is currently only taking Effexor - She denies any HI/SI.       Past Medical History  Disease Name Date Onset Notes   Abdominal pain, RUQ --  --    Anxiety --  --    Arthritis --  --    Bloating symptom --  --    Change in stool --  --    Colitis --  --    Colon Cancer --  --    Depression --  --    DJD (degenerative joint disease) of knee --  --    Erosive gastritis --  --    GERD (gastroesophageal reflux disease) --  --    Irritable bowel syndrome --  --    Seasonal allergies --  --          Past Surgical History  Procedure Name Date Notes   Cholecstectomy --  --    Cholecystecomy --  --    Gallbladder --  --    Hysterectomy --  --    Knee surgery --  --          Medication List  Name Date Started Instructions   selenium sulfide 2.5 % topical lotion 02/24/2021 apply to the affected area(s), lather, leave in place for 10 minutes and then rinse off with water by topical route once daily for 7 days   Tagamet  mg oral tablet 11/24/2020 take 1 tablet (200 mg) by oral route 2 times per day 30 minutes before meals for 30 days   venlafaxine 150 mg oral tablet extended release 24hr 11/25/2020 take 1 tablet (150 mg) by oral route once daily in the morning at the same time each day with food for 30 days   Ventolin HFA 90 mcg/actuation inhalation HFA aerosol inhaler " 11/19/2019 inhale 1 - 2 puffs (90 - 180 mcg) by inhalation route every 6 hours as needed for 30 days   Vitamin D2 50,000 unit oral capsule 04/11/2019 take 1 capsule (50,000 unit) by oral route once weekly for 30 days         Allergy List  Allergen Name Date Reaction Notes   PENICILLINS --  --  --    SULFA (SULFONAMIDES) --  --  --          Family Medical History  Disease Name Relative/Age Notes   Asthma Sister/   Sister   Depression Father/  Mother/   Father; Mother   DM Type II Mother/   Mother   Cardiac Conduction Disorder  --    Hypertension Father/   Father   Lung cancer  --    Diabetes mellitus, type II  --    Arthrtis Father/  Mother/   Father; Mother   Osteoporosis Mother/   Mother   Alcohol abuse Father/   Father   Mother, Grandmother, or Sister developed Heart Disease before the age of 65  --          Social History  Finding Status Start/Stop Quantity Notes   Alcohol Never --/-- --  never drinks alcohol   Caffeine --  --/-- --  --    Exercises regularly --  --/-- --  0 times per week    --  --/-- --  --    Recreational Drug Use Never --/-- --  never used   Tobacco Current every day --/-- 2 PPD --    Uses seatbelts --  --/-- --  yes         Immunizations  NameDate Admin Mfg Trade Name Lot Number Route Inj VIS Given VIS Publication   Ummtnhhdn41/19/2019 [facility] FLUARIX [id number] IM  11/19/2019    Comments: NDC [id number]         Review of Systems  · Constitutional  o Admits  o : fatigue  o Denies  o : fever, chills  · Eyes  o Denies  o : double vision, blurred vision  · HENT  o Denies  o : chronic sinus problem  · Cardiovascular  o Denies  o : chest pain, palpitations  · Respiratory  o Denies  o : shortness of breath, cough  · Gastrointestinal  o Admits  o : nausea, vomiting, abdominal pain - all chronic and seeing GI  · Genitourinary  o Denies  o : dysuria, urinary retention  · Integument  o Admits  o : additional integumentary symptoms except as noted in the HPI  o Denies  o : pigmentation changes,  "skin dryness, nail changes  · Neurologic  o Admits  o : tingling or numbness, loss of balance  · Musculoskeletal  o Admits  o : limitation of motion, back pain  · Psychiatric  o Admits  o : anxiety, depression, difficulty sleeping  o Denies  o : suicidal ideation, homicidal ideation      Vitals  Date Time BP Position Site L\R Cuff Size HR RR TEMP (F) WT  HT  BMI kg/m2 BSA m2 O2 Sat FR L/min FiO2 HC       02/24/2021 10:09 /88 Sitting    70 - R  97.1 151lbs 0oz 5'  5\" 25.13 1.77 99 %            Physical Examination  · Constitutional  o Appearance  o : well-nourished, well developed, alert, in no acute distress, well-tended appearance, no obvious deformities present  · Head and Face  o HEENT  o : Unremarkable - wearing a mask  · Eyes  o Conjunctivae  o : conjunctivae normal, no exudates present  · Neck  o Inspection/Palpation  o : normal appearance, no masses or tenderness, trachea midline  o Thyroid  o : no thyromegaly  · Respiratory  o Respiratory Effort  o : breathing unlabored  o Auscultation of Lungs  o : clear to auscultation  · Cardiovascular  o Heart  o :   § Auscultation of Heart  § : regular rate, normal rhythm, no murmurs present  o Peripheral Vascular System  o :   § Extremities  § : no edema  · Lymphatic  o Neck  o : no lymphadenopathy present  · Musculoskeletal  o General  o :   § General Musculoskeletal  § : Muscle tone, strength, and development grossly normal. No gross spinal deformity on exam - the midline spine is NTTP; however there is paraspinal tenderness on the left and right, actually worse on the right than on the left. ROM is guarded.   · Skin and Subcutaneous Tissue  o General Inspection  o : no lesions present, no areas of discoloration, skin turgor normal, texture normal  · Neurologic  o Gait and Station  o :   § Gait Screening  § : normal gait  · Psychiatric  o Mood and Affect  o : mood normal, affect appropriate          Assessment  · Anxiety disorder     300.00/F41.9  · Chronic " midline low back pain with left-sided sciatica       Lumbago with sciatica, left side     724.2/M54.42  Other chronic pain     724.2/G89.29  · Depression     311/F32.9  · DDD (degenerative disc disease), lumbosacral     722.52/M51.37  · Lumbar spinal stenosis     724.02/M48.061  · Hemochromatosis     275.03/E83.119  · Lipid screening     V77.91/Z13.220      Plan  · Orders  o CBC with Auto Diff HM (16940) - 724.2/M54.42, 275.03/E83.119 - 02/24/2021  o CMP [facility] (17348) - 724.2/M54.42, 275.03/E83.119 - 02/24/2021  o Lipid Panel [facility] (66805) - V77.91/Z13.220 - 02/24/2021  o ACO-39: Current medications updated and reviewed (1159F, ) - - 02/24/2021  o NEUROSURGEON CONSULTATION (NEUSR) - 724.2/M54.42, 722.52/M51.37, 724.02/M48.061 - 02/24/2021   Known to Dr. Santiago - last saw in 2019  o PAIN MANAGEMENT CONSULTATION (PAINM) - 724.2/M54.42, 722.52/M51.37, 724.02/M48.061 - 02/24/2021   Know to CPA - last saw 2 years ago  o MRI lumbar spine w/o contrast (04453) - 724.2/M54.42, 722.52/M51.37, 724.02/M48.061 - 02/24/2021  o IM - Injection Fee HM (23333) - 724.2/M54.42, 722.52/M51.37, 724.02/M48.061 - 02/24/2021  o Solu-Medrol Injection 40mg (-9) - 724.2/M54.42, 722.52/M51.37, 724.02/M48.061 - 02/24/2021   Injection - Solu-Medrol 40mg; Dose: 1mL; Site: Right Gluteus; Route: intramuscular; Date: 02/24/2021 11:15:11; Exp: 01/01/2022; Lot: ; Mfg: [id number]; TradeName: methylprednisolone sod suc(PF); Location: [facility]; Administered By: Mary Villalta MA; Comment: [id number]  o Iron Profile (Iron 29826 TIBC 08292 and Transferrin 74135) (IRONP) - 275.03/E83.119 - 02/24/2021  o Ferritin ser/plas (13658) - 275.03/E83.119 - 02/24/2021  · Medications  o diclofenac sodium 50 mg oral tablet,delayed release (/EC)   SIG: take 1 tablet (50 mg) by oral route 2 times per day as needed for back pain   DISP: (60) Tablet with 0 refills  Prescribed on 02/24/2021     o baclofen 10 mg oral tablet   SIG:  take 1 tablet (10 mg) by oral route 3 times per day PRN back pain/muscle spasms   DISP: (30) Tablet with 0 refills  Prescribed on 02/24/2021     o trazodone 50 mg oral tablet   SIG: take 1-2 tablets ( mg) by oral route once daily at bedtime for insomnia. Do take Effexor at bedtime.   DISP: (60) Tablet with 0 refills  Prescribed on 02/24/2021     o selenium sulfide 2.5 % topical lotion   SIG: apply to the affected area(s), lather, leave in place for 10 minutes and then rinse off with water by topical route once daily for 7 days   DISP: (118) Milliliter with 1 refills  Refilled on 02/24/2021     o Medications have been Reconciled  o Transition of Care or Provider Policy  · Instructions  o Take all medications as prescribed/directed.  o Patient was educated/instructed on their diagnosis, treatment and medications prior to discharge from the clinic today. I will give her a steroid shot today and we will attempt a trial of diclofenac and baclofen - advised to try APAP 500mg QID as well. Will refer back to Dr. Santiago and pain management and attempt MRI given progressive LLE sxs. Will check labs and give refills per above; re-start Trazodone. Follow up in 3-4 weeks. Advised patient that she should really continue to see Ms. Garrison for her chronic co-morbid conditions d/t their long-standing history, but I would happy to see her if needed for acute issues when Ms. Garrison is unavailable. Voiced understanding.   o Chronic conditions reviewed and taken into consideration for today's treatment plan.  · Disposition  o Call or Return if symptoms worsen or persist.            Electronically Signed by: ASAF Lema -Author on February 24, 2021 11:16:13 AM

## 2021-05-07 NOTE — PROGRESS NOTES
Progress Note      Patient Name: Holly Morin   Patient ID: [id number]   Sex: Female   YOB: 1970        Visit Date: August 20, 2018    Provider: BRANDO Mott   Location: [facility]   Location Address: [address] [place] [address]  [id number]   Location Phone: [phone]          Chief Complaint     PATIENT IS HERE FOR REFILLS,     TO DISCUSS ABNORMAL LAB WITH KIDNEYS AND SHE SHOWED YOU A SPOT IN HER PRIVATE AREA WHEN YOU DID HER YEARLY EXAM.      YOU GAVE HER SOME MEDICINE AND IT DID NOT WORK AND NOW THE PLACE IS BIGGER.       History Of Present Illness  Holly Morin is a 47 year old /White female who presents for evaluation and treatment of:      pt here for the F/U on the barthalon cyst that she mentioned during the pap in April--and we gave her antibiotics and then also had her to do the sitz baths and then did not help and in fact over the last few months gotten bigger--    then also refills on her meds is due--    then pt is the one responsible for getting her parents to the doctor visits and her father was just Dxd with pancreatic cancer--    and then pt also states due to all the stress--been starting to have trouble wut insomnia again--was doing good on the 75 mg at HS--and also states up eating at night when she cannot sleep--was placed on this several yrs ago--for insomnia and then pt states no Hx of migraines no Hx of fibro--    and pt had worked very hard and gotten her weight down and now regaining the weight again--and still eats the same during the day but at night admits to eating--    and pt tried melatonin in the past did not help--and used trazodone 50 mg in the past but pt thinks DR Pineda changed her from the trazodone to the Elavil--instead of increasing the dose--and states always did really good on the trazodone In the past     pt was seen in the afterhours for a UTI and glucose was 155 and received the letter  regarding her kidney tests--and she's been increasing her water intact since --and pt not eaten today--    DR Pineda prescribed her the etodalac 400 mg TID as needed for her LBP--and pt states does help--    and then also sees hematologist as well and they are having her have her  give the B12 shots once monthly--       Past Medical History  Disease Name Date Onset Notes   Abdominal pain, RUQ --  --    Anxiety --  --    Arthritis --  --    Bloating symptom --  --    Change in stool --  --    Colitis --  --    Colon Cancer --  --    Depression --  --    GERD (gastroesophageal reflux disease) --  --    Irritable Bowel Syndrome --  --    Seasonal allergies --  --          Past Surgical History  Procedure Name Date Notes   Cholecstectomy --  --    Cholecystecomy --  --    Gallbladder --  --    Hysterectomy --  --    Knee surgery --  --          Medication List  Name Date Started Instructions   amitriptyline 75 mg oral tablet 08/04/2018 take 1 tablet (75 mg) by oral route once daily at bedtime   baclofen 10 mg oral tablet 07/10/2018 take 1 tablet (10 mg) by oral route 3 times per day for 7 days   buspirone 5 mg oral tablet  take 1 tablet (5 mg) by oral route 3 times per day   etodolac 400 mg oral tablet 08/14/2018 TAKE 1 TABLET 3 times daily PRN pain   Linzess 72 mcg oral capsule  take 1 capsule (72 mcg) by oral route once daily on an empty stomach at least 30 minutes before 1st meal of the day   Mobic 15 mg oral tablet  take 1 tablet (15 mg) by oral route once daily   ondansetron HCl 4 mg oral tablet 01/18/2018 take 1 tablet twice daily for nausea   pantoprazole 40 mg oral tablet,delayed release (DR/EC)  take 1 tablet (40 mg) by oral route once daily   prednisone 20 mg oral tablet 07/10/2018 take 2 tablets by oral route daily for 5 days   Probiotic 10 billion cell oral capsule 01/18/2018 take 1 capsule by oral route QD for antibiotic use   ranitidine HCl 300 mg oral tablet 01/18/2018 take 1 tablet (300 mg) by oral  route once daily at bedtime   TRONVITE 1 MG TABS 1 TAB 08/08/2018 TAKE 1 TABLET BY MOUTH ONCE DAILY   venlafaxine 150 mg oral tablet extended release 24hr 08/04/2018 take 1 capsule 1daily with food         Allergy List  Allergen Name Date Reaction Notes   PENICILLINS --  --  --    SULFA (SULFONAMIDES) --  --  --          Family Medical History  Disease Name Relative/Age Notes   Alcohol Abuse / Father    Father/    Arthrtis / Father; Mother    Father/     Mother/    Asthma / Sister    Sister/    Cardiac Conduction Disorder / --    Depression / Father; Mother    Father/     Mother/    Diabetes mellitus, type II / --    DM Type II / Mother    Mother/    Hypertension / Father    Father/    Lung cancer / --    Mother, Grandmother, or Sister developed Heart Disease before the age of 65 / --    Osteoporosis / Mother    Mother/          Social History  Finding Status Start/Stop Quantity Notes   Alcohol Never --/-- --  never drinks alcohol   Caffeine --  --/-- --  --    Exercises regularly --  --/-- --  0 times per week    --  --/-- --  --    Recreational Drug Use Never --/-- --  never used   Tobacco Former --/-- 2 PPD former smoker, smokes 1 to 2 packs per day, for 10 years, never uses other tobacco products   Uses seatbelts --  --/-- --  yes         Immunizations  NameDate Admin Mfg Trade Name Lot Number Route Inj VIS Given VIS Publication   [id number]/01/2017 UNK Unknown TradeName [id number] NE NE 04/04/2018 08/07/2015   Comments:          Review of Systems  · Constitutional  o Admits  o : fatigue, weight gain  o Denies  o : fever  · HENT  o Denies  o : vertigo, recent head injury  · Cardiovascular  o Denies  o : chest pain, irregular heart beats  · Respiratory  o Denies  o : shortness of breath, productive cough  · Gastrointestinal  o Admits  o : constipation, heartburn  o Denies  o : nausea, vomiting      Vitals  Date Time BP Position Site L\R Cuff Size HR RR TEMP(F) WT  HT  BMI kg/m2 BSA m2 O2 Sat HC      "  08/20/2018 11:33 /70 Sitting    120 - R  98 203lbs 0oz 5'  6.25\" 32.52 2.07 93 %           Physical Examination  · Constitutional  o Appearance  o : well developed, well-nourished, in no acute distress  · Head and Face  o HEENT  o : Unremarkable  · Eyes  o Conjunctivae  o : conjunctivae normal  · Neck  o Inspection/Palpation  o : supple  o Thyroid  o : no thyromegaly  · Respiratory  o Respiratory Effort  o : breathing unlabored  o Auscultation of Lungs  o : clear to ascultation  · Cardiovascular  o Heart  o :   § Auscultation of Heart  § : regular rate and rhythm  o Peripheral Vascular System  o :   § Extremities  § : no edema  · Gastrointestinal  o Abdominal Examination  o :   § Abdomen  § : soft  · Genitourinary  o FEMALE  EXAM:  o : (+) pea to dime sized palpable knot to the left lower inner labia not tender   · Lymphatic  o Neck  o : no lymphadenopathy present  · Musculoskeletal  o General  o :   § General Musculoskeletal  § : No joint swelling or deformity., Muscle tone, strength, and development grossly normal.  · Skin and Subcutaneous Tissue  o General Inspection  o : no lesions present, no areas of discoloration, skin turgor normal, texture normal  · Neurologic  o Gait and Station  o :   § Gait Screening  § : normal gait  · Psychiatric  o Mood and Affect  o : mood normal, affect appropriate          Assessment  · Fatigue     780.79/R53.83  · GERD (gastroesophageal reflux disease)     530.81/K21.9  · Insomnia     780.52/G47.00  · Low back pain     724.2/M54.5  · Constipation     564.00/K59.00  · Depression     311/F32.9  · Elevated glucose level     790.29/R73.09  · Abnormal kidney function     593.9/N28.9  · Screening, lipid     V77.91/Z13.220  · Bartholin cyst     616.2/N75.0  · Bone pain     733.90/M89.8X9      Plan  · Orders  o CBC with Auto Diff [facility] (10881) - 780.79/R53.83 - 08/20/2018  o CMP [facility] (90644) - 780.79/R53.83, 790.29/R73.09, 593.9/N28.9 - 08/20/2018  o Thyroid Profile (THYII) - " 780.79/R53.83, 311/F32.9 - 08/20/2018  o B12 Folate levels (B12FO) - 780.79/R53.83 - 08/20/2018  o Hgb A1c [facility] (72793) - 780.79/R53.83, 790.29/R73.09 - 08/20/2018  o Lipid Panel [facility] (28327) - V77.91/Z13.220 - 08/20/2018  o ACO-39: Current medications updated and reviewed () - - 08/20/2018  o Gynecology Consult (GYN) - 616.2/N75.0 - 08/20/2018  o Vitamin D (25-Hydroxy) Level (35648) - 780.79/R53.83, 733.90/M89.8X9 - 08/20/2018  · Medications  o trazodone 50 mg oral tablet   SIG: take 1 tablet (50 mg) by oral route once daily at bedtime   DISP: (30) tablets with 5 refills  Prescribed on 08/20/2018     o baclofen 10 mg oral tablet   SIG: take 1 tablet by oral route daily as needed   DISP: (30) tablets with 5 refills  Adjusted on 08/20/2018     o etodolac 300 mg oral capsule   SIG: take 1 capsule (300 mg) by oral route 2 times per day with food   DISP: (60) capsules with 5 refills  Adjusted on 08/20/2018     o Linzess 145 mcg oral capsule   SIG: take 1 capsule (145 mcg) by oral route once daily on an empty stomach at least 30 minutes before 1st meal of the day   DISP: (30) capsules with 5 refills  Adjusted on 08/20/2018     o pantoprazole 40 mg oral tablet,delayed release (DR/EC)   SIG: take 1 tablet (40 mg) by oral route once daily   DISP: (30) tablet with 5 refills  Adjusted on 08/20/2018     o ranitidine HCl 300 mg oral tablet   SIG: take 1 tablet (300 mg) by oral route once daily at bedtime for 30 days   DISP: (30) tablet with 5 refills  Adjusted on 08/20/2018     o venlafaxine 150 mg oral tablet extended release 24hr   SIG: take 1 capsule 1daily with food   DISP: (30) Capsule with 5 refills  Adjusted on 08/20/2018     o amitriptyline 75 mg oral tablet   SIG: take 1 tablet (75 mg) by oral route once daily at bedtime   DISP: (30) Tablet with 1 refills  Discontinued on 08/20/2018     o Mobic 15 mg oral tablet   SIG: take 1 tablet (15 mg) by oral route once daily   DISP: (0) tablet with 0 refills  Discontinued on  08/20/2018     o ondansetron HCl 4 mg oral tablet   SIG: take 1 tablet twice daily for nausea   DISP: (60) tablet with 5 refills  Discontinued on 08/20/2018     o prednisone 20 mg oral tablet   SIG: take 2 tablets by oral route daily for 5 days   DISP: (10) tablets with 0 refills  Discontinued on 08/20/2018     o Probiotic 10 billion cell oral capsule   SIG: take 1 capsule by oral route QD for antibiotic use   DISP: (10) capsules with 0 refills  Discontinued on 08/20/2018     · Instructions  o Maintain a healthy weight. Avoid tight fitting clothes. Avoid fried, fatty foods, tomato sauce, chocolate, mint, garlic, onion, alcohol. caffeine. Eat smaller meals, dont lie down after a meal, dont smoke. Elevate the head of your bed 6-9 inches.  o Take all medications as prescribed/directed.  o Patient was educated/instructed on their diagnosis, treatment and medications prior to discharge from the clinic today.  o Patient instructed to seek medical attention urgently for new or worsening symptoms.  o Call the office with any concerns or questions.  o Chronic conditions reviewed and taken into consideration for today's treatment plan.  · Disposition  o Call or Return if symptoms worsen or persist.  o Return Visit Request in/on 6 months +/- 2 days (0861).     will lower the freq and dose on the etodalac as well--due to the last kidney tests     and changed her back to trazodone and pt to stop the amitriptyline and then will get the labs--    and if pt starts to get anxious and/or having trouble sleeping--will refill her buspar again--she's not been taking it--                 Electronically Signed by: BRANDO Mott -Author on August 20, 2018 12:18:06 PM

## 2021-05-07 NOTE — PROGRESS NOTES
Progress Note      Patient Name: Holly Morin   Patient ID: [id number]   Sex: Female   YOB: 1970        Visit Date: February 5, 2019    Provider: BRANDO Mott   Location: [facility]   Location Address: [address] [place] [address]  [id number]   Location Phone: [phone]          Chief Complaint     here to follow up on dosage increases on venlafaxine and trazadone.       History Of Present Illness  Holly Morin is a 48 year old /White female who presents for evaluation and treatment of:      pt states grief s/s are getting better--only cries approx. 1-2 times daily--just lost her father in Dec--    then the trazodone 50 mg 1.5 tabs Q HS--pt is sleeping better-  then the Effexor 75 mg 3 tabs daily--helping with the depression and anxiety  buspar 15 mg TID--panic attacks better     feels like approx. a little better--    also added the right knee hurting liking pinching really bad-since starting the PT--for the LBP and then spoke with kelsey yesterday and had to cancel due to the pain--pt took 2 of the baclofen and helped little--resting it helps--and cannot straighten all the way of regarding the right knee--and reports hard time walking on it-and pt reports been to ortho and they did the injections and pt states they are bone on bone--saw DR White and they even did the symvisc as well--and really she needs the surgery for the total knee replacement--and they also already did the arthroscopy and cleaned out the joint as well--so Kelsey will re-eval tomorrow --       Past Medical History  Disease Name Date Onset Notes   Abdominal pain, RUQ --  --    Anxiety --  --    Arthritis --  --    Bloating symptom --  --    Change in stool --  --    Colitis --  --    Colon Cancer --  --    Depression --  --    GERD (gastroesophageal reflux disease) --  --    Irritable Bowel Syndrome --  --    Seasonal allergies --  --          Past Surgical  History  Procedure Name Date Notes   Cholecstectomy --  --    Cholecystecomy --  --    Gallbladder --  --    Hysterectomy --  --    Knee surgery --  --          Medication List  Name Date Started Instructions   baclofen 10 mg oral tablet 08/20/2018 take 1 tablet by oral route daily as needed   buspirone 15 mg oral tablet 01/10/2019 take 1 tablet by oral route 3 times a day for 30 days for anxiety   etodolac 400 mg oral tablet 11/26/2018 TAKE ONE TABLET BY MOUTH THREE TIMES DAILY AS NEEDED FOR PAIN   Linzess 145 mcg oral capsule 08/20/2018 take 1 capsule (145 mcg) by oral route once daily on an empty stomach at least 30 minutes before 1st meal of the day   pantoprazole 40 mg oral tablet,delayed release (DR/EC) 08/20/2018 take 1 tablet (40 mg) by oral route once daily   ranitidine HCl 300 mg oral tablet 08/20/2018 take 1 tablet (300 mg) by oral route once daily at bedtime for 30 days   trazodone 50 mg oral tablet 01/10/2019 take 1.5 tablets by oral route daily   TRONVITE 1 MG TABS 1 TAB 08/08/2018 TAKE 1 TABLET BY MOUTH ONCE DAILY   venlafaxine 75 mg oral capsule,extended release 24hr 01/10/2019 take 3 capsules (225 mg) by oral route once daily with food   Vitamin D2 50,000 unit oral capsule 01/11/2019 take 1 capsule (50,000 unit) by oral route once weekly for 30 days         Allergy List  Allergen Name Date Reaction Notes   PENICILLINS --  --  --    SULFA (SULFONAMIDES) --  --  --          Family Medical History  Disease Name Relative/Age Notes   Alcohol Abuse / Father    Father/    Arthrtis / Father; Mother    Father/     Mother/    Asthma / Sister    Sister/    Cardiac Conduction Disorder / --    Depression / Father; Mother    Father/     Mother/    Diabetes mellitus, type II / --    DM Type II / Mother    Mother/    Hypertension / Father    Father/    Lung cancer / --    Mother, Grandmother, or Sister developed Heart Disease before the age of 65 / --    Osteoporosis / Mother    Mother/          Social  History  Finding Status Start/Stop Quantity Notes   Alcohol Never --/-- --  never drinks alcohol   Caffeine --  --/-- --  --    Exercises regularly --  --/-- --  0 times per week    --  --/-- --  --    Recreational Drug Use Never --/-- --  never used   Tobacco Former --/-- 2 PPD quit in december   Uses seatbelts --  --/-- --  yes         Immunizations  NameDate Admin Mfg Trade Name Lot Number Route Inj VIS Given VIS Publication   [id number]/29/2018 [facility] Fluzone Quadrivalent [id number] IM  10/29/2018 08/07/2015   Comments: NDC [id number]   Jogfbmuca15/01/2017 UNK Unknown TradeName [id number] NE NE 04/04/2018 08/07/2015   Comments:          Review of Systems  · Constitutional  o Denies  o : fever  · Cardiovascular  o Denies  o : chest pain, irregular heart beats  · Respiratory  o Denies  o : shortness of breath, productive cough  · Gastrointestinal  o Denies  o : nausea, vomiting      Vitals  Date Time BP Position Site L\R Cuff Size HR RR TEMP(F) WT  HT  BMI kg/m2 BSA m2 O2 Sat        02/05/2019 09:32 /86 Sitting    102 - R  97.4 196lbs 8oz    98 %           Physical Examination  · Constitutional  o Appearance  o : well developed, well-nourished, in no acute distress  · Head and Face  o HEENT  o : Unremarkable  · Eyes  o Conjunctivae  o : conjunctivae normal  · Neck  o Inspection/Palpation  o : supple  o Thyroid  o : no thyromegaly  · Respiratory  o Respiratory Effort  o : breathing unlabored  o Auscultation of Lungs  o : normal breath sounds  · Cardiovascular  o Heart  o :   § Auscultation of Heart  § : regular rate, normal rhythm, no murmurs present, no pericardial friction rub  o Peripheral Vascular System  o :   § Extremities  § : no edema  · Lymphatic  o Neck  o : no lymphadenopathy present  · Musculoskeletal  o General  o :   § General Musculoskeletal  § : right knee with limited ROM--difficulty completely extending the knee joint--and appears swelled somewhat as well--  · Skin and Subcutaneous  Tissue  o General Inspection  o : skin turgor normal, texture normal  · Neurologic  o Gait and Station  o :   § Gait Screening  § : normal gait  · Psychiatric  o Mood and Affect  o : mood normal, affect appropriate-- with pt and she is making good eye contact and not tearful today during the appoint          Assessment  · Grief reaction     309.0/F43.21  · Anxiety with depression     300.4/F41.8  · Insomnia     780.52/G47.00  · DJD (degenerative joint disease) of knee     715.36/M17.10  · Right knee pain     719.46/M25.561  · Low back pain     724.2/M54.5      Plan  · Orders  o ACO-39: Current medications updated and reviewed () - - 02/05/2019  · Medications  o baclofen 20 mg oral tablet   SIG: take 1 tablet by oral route 3 times a day as needed for LBP or knee pain   DISP: (90) tablets with 5 refills  Adjusted on 02/05/2019     o buspirone 15 mg oral tablet   SIG: take 1 tablet by oral route 3 times a day for 30 days for anxiety   DISP: (90) tablets with 5 refills  Adjusted on 02/05/2019     o etodolac 400 mg oral tablet   SIG: TAKE ONE TABLET BY MOUTH THREE TIMES DAILY AS NEEDED FOR PAIN   DISP: (90) Tablet with 2 refills  Adjusted on 02/05/2019     o trazodone 50 mg oral tablet   SIG: take 1.5 tablets by oral route daily   DISP: (45) tablets with 5 refills  Adjusted on 02/05/2019     o venlafaxine 75 mg oral capsule,extended release 24hr   SIG: take 3 capsules (225 mg) by oral route once daily with food for 30 days   DISP: (90) capsules with 5 refills  Adjusted on 02/05/2019     · Instructions  o Take all medications as prescribed/directed.  o Patient was educated/instructed on their diagnosis, treatment and medications prior to discharge from the clinic today.  o Patient instructed to seek medical attention urgently for new or worsening symptoms.  o Call the office with any concerns or questions.  o Chronic conditions reviewed and taken into consideration for today's treatment  plan.  · Disposition  o Call or Return if symptoms worsen or persist.     pt will F/U after completing the PT for the LBP--and then re-eval that--may need the MRI             Electronically Signed by: BRANDO Mott -Author on February 5, 2019 10:11:24 AM

## 2021-05-07 NOTE — PROGRESS NOTES
Progress Note      Patient Name: Holly Morin   Patient ID: [id number]   Sex: Female   YOB: 1970        Visit Date: January 10, 2019    Provider: BRANDO Mott   Location: [facility], [place], operated by [facility]   Location Address: [address] [place] [address]  [id number]   Location Phone: [phone]          Chief Complaint     PATIENT IS HERE TO DISCUSS HER DEPRESSION MEDICATION WITH YOU.  SHE ALSO HAS BEEN HAVING SOME PAIN IN DIFFERENT SPOTS THROUGHOUT HER BODY.       History Of Present Illness  Holly Morin is a 48 year old /White female who presents for evaluation and treatment of:      1) pt here to F/U on the depression and anxiety--lost her father 12/4/18-still dealing with the grief-and at times doing pretty good and other times just hits her and she's overwhelmed     ( very supportive and always at the visits with pt--but lets her do all the talking and asking the questions and answering my questions)     then the pt reports already had to increase her trazodone from 50 mg to 100 mg just to rest at night    then Holly also reports already increased the buspar from 5 mg TID to 2-3 tabs TID-and really actually taking more of the 3 tabs (15 mg) TID than the 2 tabs (10 mg)--for the anxiety--that just hits her at times    then the Effexor  mg for the depression and anxiety--pt was wondering if could be increased at all--    reports is resting with the trazodone--if not on the trazodone up all night--    Holly admits to was taking a shower/bath nightly and now has to make herself take one weekly--and she even states she knows that's not good    then Holly states her moods are just up and down--and admits that although fleetingly there had been some thoughts of doing something to herself--she states immediately those thoughts are overcome by the thoughts of what she would put her family through-and she states she could never do that--so states no SI/HI      2) pt with sore spots on her bilat upper shoulders and back of the head going in to the upper neck and then to the upper arms then the lower back --sciatica and then down the LLE and numbness and tingling and burning--tried the baclofen 10 mg QD and not helping at all--then the LBP pretty severe--and then this has been ongoing problem for approx. 1 yr ago--lightly not very bothersome in the beginning and then approx 5 months ago got much worse--and left foot numbness and the bilat knees as well --pt was concerned could be fibromyalgia     3) pt also with vit D deficiency--and not been rechecked since August 2018-and completed the Rx of the high dose Vit D x 12 weeks       Past Medical History  Disease Name Date Onset Notes   Abdominal pain, RUQ --  --    Anxiety --  --    Arthritis --  --    Bloating symptom --  --    Change in stool --  --    Colitis --  --    Colon Cancer --  --    Depression --  --    GERD (gastroesophageal reflux disease) --  --    Irritable Bowel Syndrome --  --    Seasonal allergies --  --          Past Surgical History  Procedure Name Date Notes   Cholecstectomy --  --    Cholecystecomy --  --    Gallbladder --  --    Hysterectomy --  --    Knee surgery --  --          Medication List  Name Date Started Instructions   baclofen 10 mg oral tablet 08/20/2018 take 1 tablet by oral route daily as needed   buspirone 5 mg oral tablet 11/26/2018 TAKE ONE TABLET BY MOUTH THREE TIMES DAILY AS NEEDED FOR ANXIETY   etodolac 300 mg oral capsule 08/20/2018 take 1 capsule (300 mg) by oral route 2 times per day with food   etodolac 400 mg oral tablet 11/26/2018 TAKE ONE TABLET BY MOUTH THREE TIMES DAILY AS NEEDED FOR PAIN   Linzess 145 mcg oral capsule 08/20/2018 take 1 capsule (145 mcg) by oral route once daily on an empty stomach at least 30 minutes before 1st meal of the day   montelukast 10 mg oral tablet 09/26/2018 take 1 tablet (10 mg) by oral route once daily in the evening   nicotine 21 mg/24 hr  transdermal patch 24 hour 10/30/2018 apply 1 patch (21 mg) by transdermal route once daily and remove at bedtime for 30 days   pantoprazole 40 mg oral tablet,delayed release (DR/EC) 08/20/2018 take 1 tablet (40 mg) by oral route once daily   ranitidine HCl 300 mg oral tablet 08/20/2018 take 1 tablet (300 mg) by oral route once daily at bedtime for 30 days   trazodone 50 mg oral tablet 08/20/2018 take 1 tablet (50 mg) by oral route once daily at bedtime   TRONVITE 1 MG TABS 1 TAB 09/22/2018 TAKE 1 TABLET BY MOUTH ONCE DAILY   venlafaxine 150 mg oral tablet extended release 24hr 08/20/2018 take 1 capsule 1daily with food         Allergy List  Allergen Name Date Reaction Notes   PENICILLINS --  --  --    SULFA (SULFONAMIDES) --  --  --          Family Medical History  Disease Name Relative/Age Notes   Alcohol Abuse / Father    Father/    Arthrtis / Father; Mother    Father/     Mother/    Asthma / Sister    Sister/    Cardiac Conduction Disorder / --    Depression / Father; Mother    Father/     Mother/    Diabetes mellitus, type II / --    DM Type II / Mother    Mother/    Hypertension / Father    Father/    Lung cancer / --    Mother, Grandmother, or Sister developed Heart Disease before the age of 65 / --    Osteoporosis / Mother    Mother/          Social History  Finding Status Start/Stop Quantity Notes   Alcohol Never --/-- --  never drinks alcohol   Caffeine --  --/-- --  --    Exercises regularly --  --/-- --  0 times per week    --  --/-- --  --    Recreational Drug Use Never --/-- --  never used   Tobacco Current every day --/-- 2 PPD SHE HAS STARTED SMOKING AGAIN SINCE THE NEWS ABOUT HER FATHERS CANCER.    Uses seatbelts --  --/-- --  yes         Immunizations  NameDate Admin Mfg Trade Name Lot Number Route Inj VIS Given VIS Publication   [id number]/29/2018 [facility] Fluzone Quadrivalent [id number] IM  10/29/2018 08/07/2015   Comments: NDC [id number]   [id number]/01/2017 UNK Unknown TradeName [id number] NE NE  "04/04/2018 08/07/2015   Comments:          Review of Systems  · Constitutional  o Denies  o : fever  · HENT  o Denies  o : sinus pain, nasal congestion, nasal discharge, postnasal drip  · Cardiovascular  o Denies  o : chest pain, irregular heart beats  · Respiratory  o Denies  o : shortness of breath, productive cough  · Gastrointestinal  o Denies  o : nausea, vomiting  · Genitourinary  o Denies  o : dysuria  · Neurologic  o Admits  o : tingling or numbness, headaches  o Denies  o : memory difficulties, speech difficulties, seizures, tremors, dizziness  · Musculoskeletal  o Admits  o : joint pain, muscle pain, back pain, shoulder pain, knee pain, foot pain  o Denies  o : joint swelling, limitation of motion  · Endocrine  o Denies  o : cold intolerance, heat intolerance  · Psychiatric  o Admits  o : anxiety, depression, difficulty sleeping, additional psychiatric symptoms except as noted in the HPI  o Denies  o : suicidal ideation, homicidal ideation  · Heme-Lymph  o Denies  o : petechiae, lymph node enlargement or tenderness  · Allergic-Immunologic  o Denies  o : frequent illnesses      Vitals  Date Time BP Position Site L\R Cuff Size HR RR TEMP(F) WT  HT  BMI kg/m2 BSA m2 O2 Sat        01/10/2019 02:02 /64 Sitting    116 - R  96.6 192lbs 9oz 5'  6.25\" 30.85 2.02 97 %           Physical Examination  · Constitutional  o Appearance  o : well developed, well-nourished, in no acute distress  · Head and Face  o HEENT  o : Unremarkable  · Eyes  o Conjunctivae  o : conjunctivae normal  · Neck  o Inspection/Palpation  o : supple  o Thyroid  o : no thyromegaly  · Respiratory  o Respiratory Effort  o : breathing unlabored  · Cardiovascular  o Heart  o :   § Auscultation of Heart  § : no edema  o Peripheral Vascular System  o :   § Extremities  § : no edema  · Gastrointestinal  o Abdominal Examination  o :   § Abdomen  § : soft  · Lymphatic  o Neck  o : no lymphadenopathy present  · Musculoskeletal  o General  o : "   § General Musculoskeletal  § : tenderness with palpation to the Lspine and L>R tenderness and then radiates to the LLE--and experiences the numbness to the toes --then pt reports tenderness to multiple muscle sites: lower back of the head, shoulders, biceps, knees   · Skin and Subcutaneous Tissue  o General Inspection  o : skin turgor normal, texture normal  · Neurologic  o Gait and Station  o :   § Gait Screening  § : normal gait  · Psychiatric  o Mood and Affect  o : mood normal, affect appropriate          Assessment  · Vitamin D deficiency     268.9/E55.9  · Grief reaction     309.0/F43.21  · Anxiety     300.00/F41.9  · Depression     311/F32.9  · Insomnia     780.52/G47.00  · Low back pain     724.2/M54.5  · Lumbar radicular pain     724.4/M54.16      Plan  · Orders  o Vitamin D Level (39494) - 268.9/E55.9 - 01/10/2019  o ACO-39: Current medications updated and reviewed () - - 01/10/2019  o Lumbar Spine xray complete [facility] (21323) - 724.2/M54.5, 724.4/M54.16 - 01/10/2019   worsening symptoms for the last 5 months to the LLE and LBP x 1 yr   o PHYSICAL THERAPY CONSULTATION ([facility]) - 724.2/M54.5, 724.4/M54.16 - 01/10/2019  · Medications  o buspirone 15 mg oral tablet   SIG: take 1 tablet by oral route 3 times a day for 30 days for anxiety   DISP: (90) tablets with 1 refills  Adjusted on 01/10/2019     o trazodone 50 mg oral tablet   SIG: take 1.5 tablets by oral route daily   DISP: (45) tablets with 1 refills  Adjusted on 01/10/2019     o venlafaxine 75 mg oral capsule,extended release 24hr   SIG: take 3 capsules (225 mg) by oral route once daily with food   DISP: (90) capsules with 1 refills  Adjusted on 01/10/2019     · Instructions  o Recheck Vitamin D blood level in 8-12 weeks.  o Take all medications as prescribed/directed.  o Patient was educated/instructed on their diagnosis, treatment and medications prior to discharge from the clinic today.  o Patient instructed to seek medical  attention urgently for new or worsening symptoms.  o Call the office with any concerns or questions.  o Chronic conditions reviewed and taken into consideration for today's treatment plan.  · Disposition  o Call or Return if symptoms worsen or persist.  o Return Visit Request in/on 1 month +/- 2 days (0269).     explained to pt and her --that the combination of the trazodone and Effexor--was only safe as long as the trazodone dose was < 100 mg daily--due to could cause the elongated QTI of the heart--and pt stated she did not realize that--so she was fine with just doin the trazodone 75 mg at HS and then the Effexor 225 mg Total (which was an increase from 150 mg to the 225 mg daily --then the Buspar 15 mg TID (if needed--since she was already taking that much already)--    they will F/U in the office in 1 month--and we will see how things are going--    I again raised the option of the grief counseling--but the pt declines this at this time--she really feels much more comfortable with just her 2 closest friends, her family, and me as her PCP--    pt did make a verbal contract with me today to do no harm to herself--      right now today at this visit--I mainly focused on the LBP and we did the xrays of the Lspine--then the referral for the PT--and then the focus of her anxiety and depression and insomnia--with the grief as well--I felt these were the most important things to focus on at this visit--and checking the Vit D levels as well--    the at the next visit we can try to do more of the other items she brought up--and she will be due for all her refills as well--    ADDEMDUM: called and spoke to Holly this evening--as the radiologist had read the Xrays of the Lspine and does show DDD and some narrowing of the disc spaces at the L4-5 and L5-S1-             Electronically Signed by: BRANDO Mott -Author on January 10, 2019 06:25:32 PM

## 2021-05-07 NOTE — PROGRESS NOTES
Progress Note      Patient Name: Holly Morin   Patient ID: [id number]   Sex: Female   YOB: 1970        Visit Date: April 4, 2018    Provider: BRANDO Mott   Location: [facility]   Location Address: [address] [place] [address]  [id number]   Location Phone: [phone]          Chief Complaint     seen last summer for a knot above tail bone, seen a specialist and spot has continued to grow in size and is sore       History Of Present Illness  Holly Morin is a 47 year old /White female who presents for evaluation and treatment of:      seen last summer for a knot above tail bone--thought to be a lipom, seen a specialist and spot has continued to grow in size and is sore--and pt saw DR Cast for this and was told was a muscle inflammation--(and in the past was smaller and deep in the tissue)----and told not to be concerned--but pt states is growing in size and soreness--and is larger the size of golfball and when she sits down it hurts--       Past Medical History  Disease Name Date Onset Notes   Abdominal pain, RUQ --  --    Anxiety --  --    Arthritis --  --    Bloating symptom --  --    Change in stool --  --    Colitis --  --    Colon Cancer --  --    Depression --  --    GERD (gastroesophageal reflux disease) --  --    Irritable Bowel Syndrome --  --    Seasonal allergies --  --          Past Surgical History  Procedure Name Date Notes   Cholecstectomy --  --    Cholecystecomy --  --    Gallbladder --  --    Hysterectomy --  --    Knee surgery --  --          Medication List  Name Date Started Instructions   amitriptyline 75 mg oral tablet 01/29/2018 take 1 tablet (75 mg) by oral route once daily at bedtime   buspirone 5 mg oral tablet  take 1 tablet (5 mg) by oral route 3 times per day   etodolac 400 mg oral tablet 02/26/2018 TAKE 1 TABLET 3 times daily PRN pain   Linzess 72 mcg oral capsule  take 1 capsule (72 mcg) by oral route  once daily on an empty stomach at least 30 minutes before 1st meal of the day   Macrobid 100 mg oral capsule 03/31/2018 take 1 capsule (100 mg) by oral route every 12 hours with food for 7 days   Mobic 15 mg oral tablet  take 1 tablet (15 mg) by oral route once daily   ondansetron HCl 4 mg oral tablet 01/18/2018 take 1 tablet twice daily for nausea   pantoprazole 40 mg oral tablet,delayed release (DR/EC)  take 1 tablet (40 mg) by oral route once daily   Probiotic 10 billion cell oral capsule 01/18/2018 take 1 capsule by oral route QD for antibiotic use   ranitidine HCl 300 mg oral tablet 01/18/2018 take 1 tablet (300 mg) by oral route once daily at bedtime   venlafaxine 150 mg oral tablet extended release 24hr 01/18/2018 take 1 capsule 1daily with food         Allergy List  Allergen Name Date Reaction Notes   PENICILLINS --  --  --    SULFA (SULFONAMIDES) --  --  --          Family Medical History  Disease Name Relative/Age Notes   Alcohol Abuse / Father    Father/    Arthrtis / Father; Mother    Father/     Mother/    Asthma / Sister    Sister/    Cardiac Conduction Disorder / --    Depression / Father; Mother    Father/     Mother/    Diabetes mellitus, type II / --    DM Type II / Mother    Mother/    Hypertension / Father    Father/    Lung cancer / --    Mother, Grandmother, or Sister developed Heart Disease before the age of 65 / --    Osteoporosis / Mother    Mother/          Social History  Finding Status Start/Stop Quantity Notes   Alcohol Never --/-- --  never drinks alcohol   Caffeine --  --/-- --  --    Exercises regularly --  --/-- --  0 times per week    --  --/-- --  --    Recreational Drug Use Never --/-- --  never used   Tobacco Former --/-- 2 PPD former smoker, smokes 1 to 2 packs per day, for 10 years, never uses other tobacco products   Uses seatbelts --  --/-- --  yes         Immunizations  NameDate Admin Mfg Trade Name Lot Number Route Inj VIS Given VIS Publication   [id number]/01/2017  UNK Unknown TradeName [id number] NE NE 04/04/2018 08/07/2015   Comments:          Review of Systems  · Constitutional  o Denies  o : fever  · HENT  o Denies  o : vertigo, recent head injury  · Cardiovascular  o Denies  o : chest pain, irregular heart beats  · Respiratory  o Denies  o : shortness of breath, productive cough  · Gastrointestinal  o Denies  o : nausea, vomiting  · Genitourinary  o Denies  o : dysuria, urinary retention  · Integument  o Admits  o : additional integumentary symptoms except as noted in the HPI  o Denies  o : rash, itching, hair growth change, new skin lesions, changes to existing skin lesions or moles  · Neurologic  o Denies  o : altered mental status, seizures  · Musculoskeletal  o Denies  o : joint swelling, limitation of motion  · Endocrine  o Denies  o : cold intolerance, heat intolerance  · Heme-Lymph  o Denies  o : petechiae, lymph node enlargement or tenderness  · Allergic-Immunologic  o Denies  o : frequent illnesses      Vitals  Date Time BP Position Site L\R Cuff Size HR RR TEMP(F) WT  HT  BMI kg/m2 BSA m2 O2 Sat        04/04/2018 10:42 /90 Sitting    102 - R  98 188lbs 1oz    98 %           Physical Examination  · Constitutional  o Appearance  o : well developed, well-nourished, in no acute distress  · Eyes  o Conjunctivae  o : conjunctivae normal no drainage  o Pupils and Irises  o : pupils equal, round, and reactive to light bilaterally  · Neck  o Inspection/Palpation  o : supple  o Thyroid  o : no thyromegaly  · Respiratory  o Respiratory Effort  o : breathing unlabored  · Cardiovascular  o Heart  o :   § Auscultation of Heart  § : no edema   · Musculoskeletal  o General  o :   § General Musculoskeletal  § : No joint swelling or deformity., Muscle tone, strength, and development grossly normal.  · Skin and Subcutaneous Tissue  o General Inspection  o : skin turgor normal, texture normal--at the base of the spine at the top of the coccyx area more towards the left side of  the inner upper buttock- a palpable round tender almost golfball sized mass--feels like a cyst--pilonal cyst--  · Neurologic  o Mental Status Examination  o :   § Orientation  § : grossly oriented to person, place and time  o Gait and Station  o :   § Gait Screening  § : normal gait          Assessment  · Pilonidal cyst without abscess     685.1/L05.91      Plan  · Orders  o ACO-39: Current medications updated and reviewed () - - 04/04/2018  o Influenza immunization was ordered or administered () - - 04/04/2018  o GENERAL SURGERY ([facility]) - 685.1/L05.91 - 04/04/2018   was much smaller last summer and now almost the size of a golfball--and DR Cast saw her last yr--and it was very difficult to find--now much bigger--  · Instructions  o Patient was educated/instructed on their diagnosis, treatment and medications prior to discharge from the clinic today.  o Patient instructed to seek medical attention urgently for new or worsening symptoms.  o Call the office with any concerns or questions.  o Chronic conditions reviewed and taken into consideration for today's treatment plan.  · Disposition  o Call or Return if symptoms worsen or persist.            Electronically Signed by: BRANDO Mott -Author on April 4, 2018 11:09:09 AM

## 2021-05-07 NOTE — PROGRESS NOTES
Progress Note      Patient Name: Holly Morin   Patient ID: [id number]   Sex: Female   YOB: 1970        Visit Date: November 4, 2019    Provider: BRANDO Mott   Location: [facility], [place], operated by [facility]   Location Address: [address] [place] [address]   [place]  [address]   Location Phone: [phone]          Chief Complaint     PATIENT IS HERE BECAUSE SHE HAS BEEN HAVING SOME PROBLEMS WITH HER LOWER JAW/TEETH AREA.    SHE WENT TO HER DENTIST LAST FRIDAY AND THEY TOLD HER THAT HER TEETH/JAWS ARE FINE THAT SHE NEEDED TO FOLLOW UP WITH HER PCP BECAUSE HER GLANDS WERE SWOLLEN.      EVERY MORNING WHEN SHE GETS UP THE RIGHT SIDE OF HER FACE AND THROAT ARE FILLED WITH THICK/MUCUS, SHE HAS HAD HEADACHES, SHE HAS BEEN COUGHING AND SHE HAS BEEN SICK TO HER STOMACH IN THE MORNING ALSO.      HER RIGHT SIDE FEELS NUMB.       History Of Present Illness  Holly Morin is a 49 year old /White female who presents for evaluation and treatment of:      pt here today for the mucous production and then the sinus tenderness and pain and bad taste int he mouth--    s/s started approx. Sept--    then thought was her teeth and gums and then saw the dentist and they recommended seeing her PCP for the drainage and pain and pressure and glands     and the right side of the throat swelled and tender    intermittent numbness of the right side of face started in Sept       Past Medical History  Disease Name Date Onset Notes   Abdominal pain, RUQ --  --    Anxiety --  --    Arthritis --  --    Bloating symptom --  --    Change in stool --  --    Colitis --  --    Colon Cancer --  --    Depression --  --    DJD (degenerative joint disease) of knee --  --    GERD (gastroesophageal reflux disease) --  --    Irritable bowel syndrome --  --    Seasonal allergies --  --          Past Surgical History  Procedure Name Date Notes   Cholecstectomy --  --    Cholecystecomy --  --    Gallbladder --  --     Hysterectomy --  --    Knee surgery --  --          Medication List  Name Date Started Instructions   baclofen 20 mg oral tablet 02/05/2019 take 1 tablet by oral route 3 times a day as needed for LBP or knee pain   buspirone 30 mg oral tablet 04/10/2019 take 1 tablet (30 mg) by oral route 2 times per day for 30 days   Creon 36,000-114,000- 180,000 unit oral capsule,delayed release(DR/EC)  take 2 capsules by oral route 3 times per day with meals and 1 capsule with each snack swallowing whole. Do not crush, chew and/or divide.   etodolac 400 mg oral tablet 06/05/2019 TAKE ONE TABLET BY MOUTH THREE TIMES DAILY AS NEEDED FOR PAIN   Linzess 145 mcg oral capsule 08/20/2018 take 1 capsule (145 mcg) by oral route once daily on an empty stomach at least 30 minutes before 1st meal of the day   nicotine 14 mg/24 hr transdermal patch 24 hour 08/07/2019 apply 1 patch (14 mg) by transdermal route once daily and remove at bedtime for 30 days   nicotine 21 mg/24 hr transdermal patch 24 hour 08/07/2019 apply 1 patch (21 mg) by transdermal route once daily and remove at bedtime for 30 days   nicotine 7 mg/24 hr transdermal patch 24 hour 08/07/2019 apply 1 patch (7 mg) by transdermal route once daily and remove at bedtime for 30 days   pantoprazole 40 mg oral tablet,delayed release (DR/EC) 08/20/2018 take 1 tablet (40 mg) by oral route once daily   ranitidine HCl 300 mg oral tablet 09/18/2019 TAKE ONE TABLET BY MOUTH EACH NIGHT AT BEDTIME   trazodone 100 mg oral tablet 10/12/2019 take 1 tablet (100 mg) by oral route once daily at bedtime   venlafaxine 75 mg oral tablet 08/07/2019 take 1 tablet by oral route daily   Vitamin D2 50,000 unit oral capsule 04/11/2019 take 1 capsule (50,000 unit) by oral route once weekly for 30 days   Vitamin D3 2,000 unit oral capsule 08/08/2019 take 1 capsule by oral route daily for 30 days         Allergy List  Allergen Name Date Reaction Notes   PENICILLINS --  --  --    SULFA (SULFONAMIDES) --  --   --        Allergies Reconciled  Family Medical History  Disease Name Relative/Age Notes   Asthma Sister/   Sister   Depression Father/  Mother/   Father; Mother   DM Type II Mother/   Mother   Cardiac Conduction Disorder  --    Hypertension Father/   Father   Lung cancer  --    Diabetes mellitus, type II  --    Arthrtis Father/  Mother/   Father; Mother   Osteoporosis Mother/   Mother   Alcohol abuse Father/   Father   Mother, Grandmother, or Sister developed Heart Disease before the age of 65  --          Social History  Finding Status Start/Stop Quantity Notes   Alcohol Never --/-- --  never drinks alcohol   Caffeine --  --/-- --  --    Exercises regularly --  --/-- --  0 times per week    --  --/-- --  --    Recreational Drug Use Never --/-- --  never used   Tobacco Current every day --/-- 2 PPD --    Uses seatbelts --  --/-- --  yes         Immunizations  NameDate Admin Mfg Trade Name Lot Number Route Inj VIS Given VIS Publication   Mdwjnmbvj22/29/2018 [facility] FLUARIX [id number] IM  10/29/2018 08/07/2015   Comments: NDC [id number]   Bveugwntt31/01/2017 [facility] Unknown TradeName [id number] [facility] 04/04/2018 08/07/2015   Comments:          Review of Systems  · Constitutional  o Admits  o : fatigue  o Denies  o : fever  · HENT  o Admits  o : headaches, sinus pain, nasal congestion, nasal discharge, postnasal drip, ear pain, additional HENT symptoms except as noted in the HPI  o Denies  o : sore throat  · Cardiovascular  o Denies  o : chest pain, irregular heart beats  · Respiratory  o Admits  o : cough, dry cough  o Denies  o : shortness of breath, productive cough  · Gastrointestinal  o Admits  o : nausea  · Genitourinary  o Denies  o : dysuria  · Integument  o Denies  o : rash, hair growth change, new skin lesions  · Neurologic  o Admits  o : headaches  · Allergic-Immunologic  o Denies  o : frequent illnesses      Vitals  Date Time BP Position Site L\R Cuff Size HR RR TEMP (F) WT  HT  BMI kg/m2 BSA m2 O2 Sat HC      "  11/04/2019 04:47 /86 Sitting    112 - R  98.2 182lbs 3oz 5'  6.25\" 29.18 1.97 98 %          Physical Examination  · Constitutional  o Appearance  o : well developed, well-nourished, in no acute distress  · Head and Face  o HEENT  o : Unremarkable right eyebrown tenderness   · Eyes  o Conjunctivae  o : conjunctivae normal  · Neck  o Inspection/Palpation  o : supple  o Thyroid  o : no thyromegaly  · Respiratory  o Respiratory Effort  o : breathing unlabored  o Auscultation of Lungs  o : clear to ascultation  · Cardiovascular  o Heart  o :   § Auscultation of Heart  § : regular rate and rhythm  o Peripheral Vascular System  o :   § Extremities  § : no edema  · Gastrointestinal  o Abdominal Examination  o :   § Abdomen  § : soft nontender  · Lymphatic  o Neck  o : no lymphadenopathy present  · Musculoskeletal  o General  o :   § General Musculoskeletal  § : No joint swelling or deformity., Muscle tone, strength, and development grossly normal.  · Skin and Subcutaneous Tissue  o General Inspection  o : skin turgor normal, texture normal  · Neurologic  o Gait and Station  o :   § Gait Screening  § : normal gait  · Psychiatric  o Mood and Affect  o : mood normal, affect appropriate          Assessment  · Headache     784.0/R51  · Sinusitis, acute     461.9/J01.90  · Facial numbness     782.0/R20.0  · Elevated ferritin     790.6/R79.89    Problems Reconciled  Plan  · Orders  o CT brain wo radiopaque contrast (55928) - 784.0/R51, 782.0/R20.0 - 11/04/2019   intermittent s/s since mid-sept.   o CBC with Auto Diff [facility] (68806) - 782.0/R20.0, 784.0/R51 - 11/04/2019  o CMP [facility] (56664) - 782.0/R20.0, 784.0/R51 - 11/04/2019  o ACO-39: Current medications updated and reviewed () - - 11/04/2019  o Sed rate (41775) - 782.0/R20.0, 784.0/R51 - 11/04/2019  o Ferritin ser/plas (00494) - 790.6/R79.89 - 11/04/2019  · Medications  o azithromycin 250 mg oral tablet   SIG: take 2 tablets (500 mg) by oral route once daily for 1 day " then 1 tablet (250 mg) by oral route once daily for 4 days   DISP: (6) tablets with 0 refills  Prescribed on 11/04/2019     o baclofen 20 mg oral tablet   SIG: take 1 tablet by oral route 3 times a day as needed for LBP or knee pain   DISP: (90) tablets with 5 refills  Adjusted on 11/04/2019     o Medications have been Reconciled  o Transition of Care or Provider Policy  · Instructions  o Take all medications as prescribed/directed.  o Patient was educated/instructed on their diagnosis, treatment and medications prior to discharge from the clinic today.  o Patient instructed to seek medical attention urgently for new or worsening symptoms.  o Call the office with any concerns or questions.  o Chronic conditions reviewed and taken into consideration for today's treatment plan.  · Disposition  o Call or Return if symptoms worsen or persist.            Electronically Signed by: BRANDO Mott -Author on November 4, 2019 05:21:35 PM

## 2021-05-07 NOTE — PROGRESS NOTES
Progress Note      Patient Name: Holly Morin   Patient ID: [id number]   Sex: Female   YOB: 1970        Visit Date: September 26, 2018    Provider: ASAF Lema   Location: [facility]   Location Address: [address] [place] [address]  [id number]   Location Phone: [phone]          Chief Complaint     SORE THROAT, RUNNY NOSE, COUGH, CONGESTION       History Of Present Illness  Holly Morin is a 48 year old /White female who presents for evaluation and treatment of:      cold/allergy/sinus symptoms for 1 weeks--worse in the past 2 days.     She has fullness in head and face--nasal congestion and discharge--and when she coughs she will cough up a thick, green wad of mucous. No fever or chills. She has had sore throat. No ear pain. No shortness of breath or wheezing, but sometimes feels like she can't take a deep breath.       Past Medical History  Disease Name Date Onset Notes   Abdominal pain, RUQ --  --    Anxiety --  --    Arthritis --  --    Bloating symptom --  --    Change in stool --  --    Colitis --  --    Colon Cancer --  --    Depression --  --    GERD (gastroesophageal reflux disease) --  --    Irritable Bowel Syndrome --  --    Seasonal allergies --  --          Past Surgical History  Procedure Name Date Notes   Cholecstectomy --  --    Cholecystecomy --  --    Gallbladder --  --    Hysterectomy --  --    Knee surgery --  --          Medication List  Name Date Started Instructions   baclofen 10 mg oral tablet 08/20/2018 take 1 tablet by oral route daily as needed   buspirone 5 mg oral tablet 09/01/2018 take 1 tablet (5 mg) by oral route 3 times per day PRN anxiety   etodolac 300 mg oral capsule 08/20/2018 take 1 capsule (300 mg) by oral route 2 times per day with food   etodolac 400 mg oral tablet 09/20/2018 TAKE 1 TABLET 3 times daily PRN pain   Linzess 145 mcg oral capsule 08/20/2018 take 1 capsule (145 mcg) by oral route once  daily on an empty stomach at least 30 minutes before 1st meal of the day   pantoprazole 40 mg oral tablet,delayed release (DR/EC) 08/20/2018 take 1 tablet (40 mg) by oral route once daily   ranitidine HCl 300 mg oral tablet 08/20/2018 take 1 tablet (300 mg) by oral route once daily at bedtime for 30 days   trazodone 50 mg oral tablet 08/20/2018 take 1 tablet (50 mg) by oral route once daily at bedtime   TRONVITE 1 MG TABS 1 TAB 09/22/2018 TAKE 1 TABLET BY MOUTH ONCE DAILY   venlafaxine 150 mg oral tablet extended release 24hr 08/20/2018 take 1 capsule 1daily with food   Vitamin D2 50,000 unit oral capsule 08/21/2018 take 1 capsule (50,000 unit) by oral route once weekly for 30 days         Allergy List  Allergen Name Date Reaction Notes   PENICILLINS --  --  --    SULFA (SULFONAMIDES) --  --  --          Family Medical History  Disease Name Relative/Age Notes   Alcohol Abuse / Father    Father/    Arthrtis / Father; Mother    Father/     Mother/    Asthma / Sister    Sister/    Cardiac Conduction Disorder / --    Depression / Father; Mother    Father/     Mother/    Diabetes mellitus, type II / --    DM Type II / Mother    Mother/    Hypertension / Father    Father/    Lung cancer / --    Mother, Grandmother, or Sister developed Heart Disease before the age of 65 / --    Osteoporosis / Mother    Mother/          Social History  Finding Status Start/Stop Quantity Notes   Alcohol Never --/-- --  never drinks alcohol   Caffeine --  --/-- --  --    Exercises regularly --  --/-- --  0 times per week    --  --/-- --  --    Recreational Drug Use Never --/-- --  never used   Tobacco Former --/-- 2 PPD former smoker, smokes 1 to 2 packs per day, for 10 years, never uses other tobacco products   Uses seatbelts --  --/-- --  yes         Immunizations  NameDate Admin Mfg Trade Name Lot Number Route Inj VIS Given VIS Publication   Opkjljrdy43/01/2017 [facility] Unknown TradeName [id number] NE NE 04/04/2018 08/07/2015   Comments:           Review of Systems  · Constitutional  o Admits  o : fatigue  o Denies  o : fever, chills  · Eyes  o Denies  o : discharge from eye  · HENT  o Admits  o : headaches, sinus pain, nasal congestion, nasal discharge, sore throat  o Denies  o : vertigo, lightheadedness, tinnitus, ear pain, ear fullness, sneezing  · Cardiovascular  o Denies  o : chest pain, palpitations  · Respiratory  o Admits  o : shortness of breath, productive cough  o Denies  o : wheezing, abnormal sputum production  · Gastrointestinal  o Denies  o : nausea, vomiting, diarrhea, abdominal pain      Vitals  Date Time BP Position Site L\R Cuff Size HR RR TEMP(F) WT  HT  BMI kg/m2 BSA m2 O2 Sat HC       09/26/2018 02:52 /100 Sitting    96 - R  97.8 200lbs 0oz    96 %           Physical Examination  · Constitutional  o Appearance  o : well developed, well-nourished, in no acute distress  · Eyes  o Conjunctivae  o : conjunctivae normal, no exudates present  o Pupils and Irises  o : pupils equal and round, pupils reactive to light bilaterally  · Ears, Nose, Mouth and Throat  o Ears  o :   § External Ears  § : auricle appearance normal bilaterally, no auricle tenderness to palpation present, external auditory canal appearance within normal limits  § Otoscopic Examination  § : tympanic membrane appearance within normal limits bilaterally  § Hearing  § : response to sound normal, no tinnitus  o Nose  o :   § External Nose  § : appearance normal  § Intranasal Exam  § : mucosa within normal limits, maxillary sinus tender to exam by percussion   o Throat  o :   § Oropharynx  § : generalized hyperemia noted, tonsils within normal limits  · Neck  o Inspection/Palpation  o : supple  · Respiratory  o Respiratory Effort  o : breathing unlabored  o Auscultation of Lungs  o : clear to ascultation  · Cardiovascular  o Heart  o :   § Auscultation of Heart  § : regular rate and rhythm  o Peripheral Vascular System  o :   § Extremities  § : no  edema  · Gastrointestinal  o Abdominal Examination  o :   § Abdomen  § : soft, non-tender, non-distended, bowel sounds +  · Lymphatic  o Neck  o : no lymphadenopathy present  · Musculoskeletal  o General  o :   § General Musculoskeletal  § : Muscle tone, strength, and development grossly normal.  · Skin and Subcutaneous Tissue  o General Inspection  o : no lesions present, no areas of discoloration, skin turgor normal, texture normal  · Neurologic  o Gait and Station  o :   § Gait Screening  § : normal gait  · Psychiatric  o Mood and Affect  o : mood normal, affect appropriate              Assessment  · Sinusitis, acute     461.9/J01.90      Plan  · Orders  o ACO-39: Current medications updated and reviewed () - - 09/26/2018  · Medications  o cefdinir 300 mg oral capsule   SIG: take 1 capsule (300 mg) by oral route every 12 hours for 10 days   DISP: (20) capsules with 0 refills  Prescribed on 09/26/2018     o Mucinex DM  mg oral tablet extended release 12 hr   SIG: take 1 - 2 tablets by oral route every 12 hours as needed   DISP: (40) tablets with 0 refills  Prescribed on 09/26/2018     o montelukast 10 mg oral tablet   SIG: take 1 tablet (10 mg) by oral route once daily in the evening   DISP: (30) tablets with 5 refills  Prescribed on 09/26/2018     · Instructions  o Take all medications as prescribed/directed.  o Patient was educated/instructed on their diagnosis, treatment and medications prior to discharge from the clinic today.  · Disposition  o Call or Return if symptoms worsen or persist.            Electronically Signed by: ASAF Lema -Author on September 26, 2018 03:21:47 PM

## 2021-05-07 NOTE — PROGRESS NOTES
Progress Note      Patient Name: Holly Morin   Patient ID: [id number]   Sex: Female   YOB: 1970        Visit Date: March 12, 2018    Provider: BRANDO Mott   Location: [facility], [place], operated [facility]   Location Address: [address] [place] [address]  [id number]   Location Phone: [phone]          Chief Complaint     UTI symptoms       History Of Present Illness  Holly Morin is a 47 year old /White female who presents for evaluation and treatment of:      UTI s/s--for approx. 3 weeks     pt also got a bone scan and although the xrays didn't show it the pt had a stress fracture--from a prior fall--and hurts like OA pain all the time--    pt also needs a pap but does't want it today--but the states the barthalane gland swells at times       Past Medical History  Disease Name Date Onset Notes   Abdominal pain, RUQ --  --    Anxiety --  --    Arthritis --  --    Bloating symptom --  --    Change in stool --  --    Colitis --  --    Colon Cancer --  --    Depression --  --    GERD (gastroesophageal reflux disease) --  --    Irritable Bowel Syndrome --  --    Seasonal allergies --  --          Past Surgical History  Procedure Name Date Notes   Cholecstectomy --  --    Cholecystecomy --  --    Gallbladder --  --    Hysterectomy --  --    Knee surgery --  --          Medication List  Name Date Started Instructions   amitriptyline 75 mg oral tablet 01/29/2018 take 1 tablet (75 mg) by oral route once daily at bedtime   buspirone 5 mg oral tablet  take 1 tablet (5 mg) by oral route 3 times per day   etodolac 400 mg oral tablet 02/26/2018 TAKE 1 TABLET 3 times daily PRN pain   Linzess 72 mcg oral capsule  take 1 capsule (72 mcg) by oral route once daily on an empty stomach at least 30 minutes before 1st meal of the day   ondansetron HCl 4 mg oral tablet 01/18/2018 take 1 tablet twice daily for nausea   pantoprazole 40 mg oral tablet,delayed release (DR/EC)  take 1  tablet (40 mg) by oral route once daily   Probiotic 10 billion cell oral capsule 01/18/2018 take 1 capsule by oral route QD for antibiotic use   ranitidine HCl 300 mg oral tablet 01/18/2018 take 1 tablet (300 mg) by oral route once daily at bedtime   venlafaxine 150 mg oral tablet extended release 24hr 01/18/2018 take 1 capsule 1daily with food         Allergy List  Allergen Name Date Reaction Notes   PENICILLINS --  --  --    SULFA (SULFONAMIDES) --  --  --          Family Medical History  Disease Name Relative/Age Notes   Alcohol Abuse / Father    Father/    Arthrtis / Father; Mother    Father/     Mother/    Asthma / Sister    Sister/    Cardiac Conduction Disorder / --    Depression / Father; Mother    Father/     Mother/    Diabetes mellitus, type II / --    DM Type II / Mother    Mother/    Hypertension / Father    Father/    Lung cancer / --    Mother, Grandmother, or Sister developed Heart Disease before the age of 65 / --    Osteoporosis / Mother    Mother/          Social History  Finding Status Start/Stop Quantity Notes   Alcohol Never --/-- --  never drinks alcohol   Caffeine --  --/-- --  --    Exercises regularly --  --/-- --  0 times per week    --  --/-- --  --    Recreational Drug Use Never --/-- --  never used   Tobacco Former --/-- 2 PPD former smoker, smokes 1 to 2 packs per day, for 10 years, never uses other tobacco products   Uses seatbelts --  --/-- --  yes         Review of Systems  · Constitutional  o Denies  o : fever  · HENT  o Denies  o : vertigo, recent head injury  · Cardiovascular  o Denies  o : chest pain, irregular heart beats  · Respiratory  o Denies  o : shortness of breath, productive cough  · Gastrointestinal  o Denies  o : nausea, vomiting  · Genitourinary  o Admits  o : urgency, frequency, dysuria, additional genitourinary symptoms except as noted in the HPI  o Denies  o : urinary retention  · Integument  o Denies  o : hair growth change, new skin  lesions  · Neurologic  o Denies  o : altered mental status, seizures  · Musculoskeletal  o Admits  o : joint pain, additional musculoskeletal symptoms except as noted in the HPI  o Denies  o : joint swelling, limitation of motion  · Endocrine  o Denies  o : cold intolerance, heat intolerance  · Heme-Lymph  o Denies  o : petechiae, lymph node enlargement or tenderness  · Allergic-Immunologic  o Denies  o : frequent illnesses      Vitals  Date Time BP Position Site L\R Cuff Size HR RR TEMP(F) WT  HT  BMI kg/m2 BSA m2 O2 Sat        03/12/2018 02:38 /82 Sitting    110 - R  98.2 184lbs 0oz    98 %           Physical Examination  · Constitutional  o Appearance  o : well developed, well-nourished, in no acute distress  · Head and Face  o HEENT  o : Unremarkable  · Respiratory  o Respiratory Effort  o : breathing unlabored  o Auscultation of Lungs  o : clear to auscultation  · Cardiovascular  o Heart  o :   § Auscultation of Heart  § : regular rate and rhythm  · Gastrointestinal  o Abdomen  o : soft, (+) suprapubic-tenderness , non-distended, + bowel sounds --(-) CVA tenderness but a lower back ache   · Skin and Subcutaneous Tissue  o General Inspection  o : normal tone  · Neurologic  o Mental Status Examination  o :   § Orientation  § : grossly oriented to person, place and time  · Psychiatric  o General  o : normal affect and calm          Assessment  · UTI (urinary tract infection)     599.0/N39.0      Plan  · Orders  o ACO-39: Current medications updated and reviewed () - - 03/12/2018  o IOP - Urinalysis without Microscopy (Clinitek) [facility] (17282) - 599.0/N39.0 - 03/12/2018   glu neg shayy large ket neg sg 1.015 blo neg ph 7.0 pro neg uro 0.2 nit pos geovanna small  o Urine culture ([id number], 50621) - 599.0/N39.0 - 03/12/2018  · Medications  o Macrobid 100 mg oral capsule   SIG: take 1 capsule (100 mg) by oral route every 12 hours with food for 7 days   DISP: (14) capsules with 0 refills  Prescribed on 03/12/2018      o Diflucan 150 mg oral tablet   SIG: take 1 tablet (150 mg) by oral route once for 1 day   DISP: (1) tablet with 0 refills  Prescribed on 03/12/2018     · Instructions  o Take all medications as prescribed/directed.  o Rest. Increase Fluids.  o Patient was educated/instructed on their diagnosis, treatment and medications prior to discharge from the clinic today.  o Patient instructed to seek medical attention urgently for new or worsening symptoms.  o Call the office with any concerns or questions.  o Chronic conditions reviewed and taken into consideration for today's treatment plan.  · Disposition  o Call or Return if symptoms worsen or persist.            Electronically Signed by: BRANDO Mott -Author on March 12, 2018 02:55:16 PM

## 2021-05-07 NOTE — PROGRESS NOTES
Progress Note      Patient Name: Holly Morin   Patient ID: [id number]   Sex: Female   YOB: 1970        Visit Date: August 7, 2019    Provider: BRANDO Mott   Location: [facility]   Location Address: [address] [place] [address] [address] [place]  [address]   Location Phone: [phone]          Chief Complaint     follow up on Effexor and wants to talk about smoking cessation       History Of Present Illness  Holly Morin is a 48 year old /White female who presents for evaluation and treatment of:      pt here for the F/U on the Effexor--pt reports just too strong of a dose now--and sort of makes her no energy and then she tried holding it for 8 days and then felt terrible and called the pharmacy and they instructed her could not abruptly stop it and then during that time off themed-was able to grief her loss of her daddy--and now feels much better-    but would like the dose decreased on the Effexor     pt sees the gastroenterologist and Dr Casillas --and she thinks pt's pancreas not producing enough enzymes and placed her on the creon DR 36,000 units 2 caps TID with food --and is helping her s/s --and no longer having the left upper quad pain and still has to eat bland foods--pt reports living on tomatoes soup--and mashed potatoes --and F/U with GI in 2 months--    BP was running high--and then also in July when saw Gastro was elevated and here today elevated--but at home her BP is completely normal--she uses her husbands cuff --and admits just prior to walking in to a doctor office she does smoke a cigarette to help her with the stress of being there     and pt also restarted smoking and then needs to get patches  as well --1 PPD smoking today--and pt admits has started wheezing at times since restarting smoking     and also would like Vit D refilled       Past Medical History  Disease Name Date Onset Notes   Abdominal pain, RUQ --  --    Anxiety --  --     Arthritis --  --    Bloating symptom --  --    Change in stool --  --    Colitis --  --    Colon Cancer --  --    Depression --  --    DJD (degenerative joint disease) of knee --  --    GERD (gastroesophageal reflux disease) --  --    Irritable bowel syndrome --  --    Seasonal allergies --  --          Past Surgical History  Procedure Name Date Notes   Cholecstectomy --  --    Cholecystecomy --  --    Gallbladder --  --    Hysterectomy --  --    Knee surgery --  --          Medication List  Name Date Started Instructions   baclofen 20 mg oral tablet 02/05/2019 take 1 tablet by oral route 3 times a day as needed for LBP or knee pain   buspirone 30 mg oral tablet 04/10/2019 take 1 tablet (30 mg) by oral route 2 times per day for 30 days   Creon 36,000-114,000- 180,000 unit oral capsule,delayed release(DR/EC)  take 2 capsules by oral route 3 times per day with meals and 1 capsule with each snack swallowing whole. Do not crush, chew and/or divide.   etodolac 400 mg oral tablet 06/05/2019 TAKE ONE TABLET BY MOUTH THREE TIMES DAILY AS NEEDED FOR PAIN   Linzess 145 mcg oral capsule 08/20/2018 take 1 capsule (145 mcg) by oral route once daily on an empty stomach at least 30 minutes before 1st meal of the day   pantoprazole 40 mg oral tablet,delayed release (DR/EC) 08/20/2018 take 1 tablet (40 mg) by oral route once daily   ranitidine HCl 300 mg oral tablet 08/20/2018 take 1 tablet (300 mg) by oral route once daily at bedtime for 30 days   trazodone 100 mg oral tablet 04/10/2019 take 1 tablet (100 mg) by oral route once daily at bedtime   venlafaxine 75 mg oral tablet 08/07/2019 take 1 tablet by oral route daily   Vitamin D2 50,000 unit oral capsule 04/11/2019 take 1 capsule (50,000 unit) by oral route once weekly for 30 days         Allergy List  Allergen Name Date Reaction Notes   PENICILLINS --  --  --    SULFA (SULFONAMIDES) --  --  --          Family Medical History  Disease Name Relative/Age Notes   Asthma Sister/    Sister   Depression Father/  Mother/   Father; Mother   DM Type II Mother/   Mother   Cardiac Conduction Disorder  --    Hypertension Father/   Father   Lung cancer  --    Diabetes mellitus, type II  --    Arthrtis Father/  Mother/   Father; Mother   Osteoporosis Mother/   Mother   Alcohol Abuse Father/   Father   Mother, Grandmother, or Sister developed Heart Disease before the age of 65  --          Social History  Finding Status Start/Stop Quantity Notes   Alcohol Never --/-- --  never drinks alcohol   Caffeine --  --/-- --  --    Exercises regularly --  --/-- --  0 times per week    --  --/-- --  --    Recreational Drug Use Never --/-- --  never used   Tobacco Current every day --/-- 2 PPD --    Uses seatbelts --  --/-- --  yes         Immunizations  NameDate Admin Mfg Trade Name Lot Number Route Inj VIS Given VIS Publication   Mlfyqxbfe27/29/2018 [facility] FLUARIX [id number] IM  10/29/2018 08/07/2015   Comments: NDC [id number]   Gjdcgokit79/01/2017 UNK Unknown TradeName [id number] NE NE 04/04/2018 08/07/2015   Comments:          Review of Systems  · Constitutional  o Denies  o : fever, headache  · HENT  o Admits  o : vertigo, lightheadedness  o Denies  o : headaches  · Cardiovascular  o Denies  o : chest pain  · Respiratory  o Admits  o : wheezing, additional respiratory symptoms except as noted in the HPI  o Denies  o : shortness of breath, asthma or wheezing, COPD  · Gastrointestinal  o Admits  o : nausea  o Denies  o : abdominal pain  · Psychiatric  o Admits  o : depression, additional psychiatric symptoms except as noted in the HPI  o Denies  o : anxiety, suicidal ideation, homicidal ideation  · Allergic-Immunologic  o Denies  o : frequent illnesses      Vitals  Date Time BP Position Site L\R Cuff Size HR RR TEMP (F) WT  HT  BMI kg/m2 BSA m2 O2 Sat HC       08/07/2019 03:15 /98 Sitting    140 - R   195lbs 7oz    96 %    08/07/2019 03:42 /82 Sitting    102 - R                 Physical  Examination  · Constitutional  o Appearance  o : well developed, well-nourished, in no acute distress  · Head and Face  o HEENT  o : Unremarkable  · Eyes  o Conjunctivae  o : conjunctivae normal  · Neck  o Inspection/Palpation  o : supple  o Thyroid  o : no thyromegaly  · Respiratory  o Respiratory Effort  o : breathing unlabored  o Auscultation of Lungs  o : clear to auscultation--except to the posterior RLL slight wheeze   · Cardiovascular  o Heart  o :   § Auscultation of Heart  § : regular rate and rhythm  o Peripheral Vascular System  o :   § Extremities  § : no edema  · Gastrointestinal  o Abdominal Examination  o :   § Abdomen  § : soft  · Lymphatic  o Neck  o : no lymphadenopathy present  · Musculoskeletal  o General  o :   § General Musculoskeletal  § : No joint swelling or deformity., Muscle tone, strength, and development grossly normal.  · Skin and Subcutaneous Tissue  o General Inspection  o : skin turgor normal, texture normal  · Neurologic  o Gait and Station  o :   § Gait Screening  § : normal gait  · Psychiatric  o Mood and Affect  o : mood normal, affect appropriate              Assessment  · Major depressive disorder     296.20/F32.2  · Tobacco abuse counseling       Tobacco abuse counseling     V65.42/Z71.6  · Vitamin D deficiency     268.9/E55.9  · Elevated blood pressure reading     796.2/R03.0  · Chronic nausea     787.02/R11.0      Plan  · Orders  o Smoking cessation counseling, 3-10 minutes [facility] (85231) - V65.42/Z71.6 - 08/07/2019  o ACO-17: Screened for tobacco use AND received tobacco cessation intervention (4004F) - V65.42/Z71.6 - 08/07/2019  o Vitamin D Level (51386) - 268.9/E55.9 - 08/07/2019  o ACO-39: Current medications updated and reviewed () - - 08/07/2019  · Medications  o nicotine 21 mg/24 hr transdermal patch 24 hour   SIG: apply 1 patch (21 mg) by transdermal route once daily and remove at bedtime for 30 days   DISP: (30) patches with 1 refills  Prescribed on 08/07/2019  "    o nicotine 14 mg/24 hr transdermal patch 24 hour   SIG: apply 1 patch (14 mg) by transdermal route once daily and remove at bedtime for 30 days   DISP: (30) patches with 0 refills  Prescribed on 08/07/2019     o nicotine 7 mg/24 hr transdermal patch 24 hour   SIG: apply 1 patch (7 mg) by transdermal route once daily and remove at bedtime for 30 days   DISP: (30) patches with 0 refills  Prescribed on 08/07/2019     o venlafaxine 75 mg oral tablet   SIG: take 1 tablet by oral route daily   DISP: (30) tablets with 2 refills  Adjusted on 08/07/2019     · Instructions  o Patient agrees to a \"No Self Harm\" contract. Patient will either call , [facility], ER, [facility], [facility].  o The patient and I discussed the need for therapy, either with a certified counselor, psychologist, and/or family . If no improvement is noted or worsening of their condition, return to office or ER. But also discussed with patient that if they are non-responsive to the type of medication they may need to see a psychaitrist for further evaluation and management.   o Patient was given an SSRI/SSNRI medication and warned of possible side effects of the medication including potential for increase risk of sucicidal thoughts and feelings. Patient was instructed that if they begin to exhibit any of these effects they will discontinue the medication immediately and contact our office or the ER ASAP.   o Tobacco and smoking cessation counseling for more than 3 minutes was completed.  o Take all medications as prescribed/directed.  o Patient was educated/instructed on their diagnosis, treatment and medications prior to discharge from the clinic today.  o Patient instructed to seek medical attention urgently for new or worsening symptoms.  o Call the office with any concerns or questions.  o Chronic conditions reviewed and taken into consideration for today's treatment plan.  · Disposition  o Call or Return if " symptoms worsen or persist.  o Return Visit Request in/on 3 months +/- 2 days ([id number]).            Electronically Signed by: BRANDO Mott -Author on August 7, 2019 05:59:20 PM

## 2021-05-07 NOTE — PROGRESS NOTES
Progress Note      Patient Name: Holly Morin   Patient ID: [id number]   Sex: Female   YOB: 1970        Visit Date: July 10, 2018    Provider: Dno Pineda MD   Location: [facility]   Location Address: [address] [place] [address]  [id number]   Location Phone: [phone]          Chief Complaint     patient c/o low back pain on right side onset yesterday.       History Of Present Illness  Holly Morin is a 47 year old /White female who presents for evaluation and treatment of:      right lower back pain x 2 days- no bowel or bladder incontinence- has frequency- sudden onset- worsening symptoms       Past Medical History  Disease Name Date Onset Notes   Abdominal pain, RUQ --  --    Anxiety --  --    Arthritis --  --    Bloating symptom --  --    Change in stool --  --    Colitis --  --    Colon Cancer --  --    Depression --  --    GERD (gastroesophageal reflux disease) --  --    Irritable Bowel Syndrome --  --    Seasonal allergies --  --          Past Surgical History  Procedure Name Date Notes   Cholecstectomy --  --    Cholecystecomy --  --    Gallbladder --  --    Hysterectomy --  --    Knee surgery --  --          Medication List  Name Date Started Instructions   amitriptyline 75 mg oral tablet 01/29/2018 take 1 tablet (75 mg) by oral route once daily at bedtime   buspirone 5 mg oral tablet  take 1 tablet (5 mg) by oral route 3 times per day   etodolac 400 mg oral tablet 07/09/2018 TAKE 1 TABLET 3 times daily PRN pain   Linzess 72 mcg oral capsule  take 1 capsule (72 mcg) by oral route once daily on an empty stomach at least 30 minutes before 1st meal of the day   Macrobid 100 mg oral capsule 03/31/2018 take 1 capsule (100 mg) by oral route every 12 hours with food for 7 days   Mobic 15 mg oral tablet  take 1 tablet (15 mg) by oral route once daily   ondansetron HCl 4 mg oral tablet 01/18/2018 take 1 tablet twice daily for nausea    pantoprazole 40 mg oral tablet,delayed release (DR/EC)  take 1 tablet (40 mg) by oral route once daily   Probiotic 10 billion cell oral capsule 01/18/2018 take 1 capsule by oral route QD for antibiotic use   ranitidine HCl 300 mg oral tablet 01/18/2018 take 1 tablet (300 mg) by oral route once daily at bedtime   venlafaxine 150 mg oral tablet extended release 24hr 01/18/2018 take 1 capsule 1daily with food         Allergy List  Allergen Name Date Reaction Notes   PENICILLINS --  --  --    SULFA (SULFONAMIDES) --  --  --          Family Medical History  Disease Name Relative/Age Notes   Alcohol Abuse / Father    Father/    Arthrtis / Father; Mother    Father/     Mother/    Asthma / Sister    Sister/    Cardiac Conduction Disorder / --    Depression / Father; Mother    Father/     Mother/    Diabetes mellitus, type II / --    DM Type II / Mother    Mother/    Hypertension / Father    Father/    Lung cancer / --    Mother, Grandmother, or Sister developed Heart Disease before the age of 65 / --    Osteoporosis / Mother    Mother/          Social History  Finding Status Start/Stop Quantity Notes   Alcohol Never --/-- --  never drinks alcohol   Caffeine --  --/-- --  --    Exercises regularly --  --/-- --  0 times per week    --  --/-- --  --    Recreational Drug Use Never --/-- --  never used   Tobacco Former --/-- 2 PPD former smoker, smokes 1 to 2 packs per day, for 10 years, never uses other tobacco products   Uses seatbelts --  --/-- --  yes         Immunizations  NameDate Admin Mfg Trade Name Lot Number Route Inj VIS Given VIS Publication   Cbjmgttnd45/01/2017 UNK Unknown TradeName [id number] NE NE 04/04/2018 08/07/2015   Comments:          Review of Systems  · Constitutional  o Denies  o : fatigue, fever  · Cardiovascular  o Denies  o : chest pain, palpitations  · Respiratory  o Denies  o : shortness of breath, cough  · Gastrointestinal  o Denies  o : nausea, vomiting,  diarrhea  · Musculoskeletal  o Admits  o : back pain      Vitals  Date Time BP Position Site L\R Cuff Size HR RR TEMP(F) WT  HT  BMI kg/m2 BSA m2 O2 Sat        07/10/2018 05:48 /110 Sitting    126 - R  98.8 194lbs 0oz    98 %           Physical Examination  · Constitutional  o Appearance  o : well developed, well-nourished, in no acute distress  · Respiratory  o Respiratory Effort  o : breathing unlabored  o Auscultation of Lungs  o : clear to ascultation  · Cardiovascular  o Heart  o :   § Auscultation of Heart  § : regular rate and rhythm  · Gastrointestinal  o Abdomen  o : soft, non-tender, non-distended, + bowel sounds, no hepatosplenomegaly, no masses palpated  · Musculoskeletal  o General  o :   § General Musculoskeletal  § : No joint swelling or deformity., Muscle tone, strength, and development grossly normal. Right paraspinal muscle tenderness, neg straight leg raise.  · Neurologic  o Mental Status Examination  o :   § Orientation  § : grossly oriented to person, place and time  o Gait and Station  o :   § Gait Screening  § : normal gait              Assessment  · Low back pain     724.2/M54.5      Plan  · Orders  o IOP - CBC (51737) - 724.2/M54.5 - 07/10/2018  o CMP [facility] (60045) - 724.2/M54.5 - 07/10/2018  o IOP - Urinalysis without Microscopy (Clinitek) [facility] ([id number]) - 724.2/M54.5 - 07/10/2018   Glu: negative Kee: large Ket:Negative S.005 Blo: negative Pro: negative Uro: 0.2 Nit: negative Sally:negative  o ACO-39: Current medications updated and reviewed () - - 07/10/2018  · Medications  o prednisone 20 mg oral tablet   SIG: take 2 tablets by oral route daily for 5 days   DISP: (10) tablets with 0 refills  Prescribed on 07/10/2018     o baclofen 10 mg oral tablet   SIG: take 1 tablet (10 mg) by oral route 3 times per day for 7 days   DISP: (21) tablets with 0 refills  Prescribed on 07/10/2018     o Macrobid 100 mg oral capsule   SIG: take 1 capsule (100 mg) by oral route every 12 hours with  food for 7 days   DISP: (14) capsules with 0 refills  Discontinued on 07/10/2018     · Instructions  o Patient was educated/instructed on their diagnosis, treatment and medications prior to discharge from the clinic today.            Electronically Signed by: Don Pineda MD -Author on July 10, 2018 06:55:04 PM

## 2021-05-07 NOTE — PROGRESS NOTES
"   Quick Note      Patient Name: Holly Morin   Patient ID: [id number]   Sex: Female   YOB: 1970    Primary Care Provider: Julissa RON   Referring Provider: Julissa RON    Visit Date: November 24, 2020    Provider: ASAF Mott   Location: [facility]   Location Address: [address] [place] [address] [place]  [address]   Location Phone: [phone]          History Of Present Illness  Video Conferencing Visit  Holly Morin is a 50 year old /White female who is presenting for evaluation via video conferencing via [employer] video--was attempted and pt called and instructed to use the home phone only--she  not there for the video visit and pt does not have zoom access--did the telephone visit.. Verbal consent obtained before beginning visit.   The following staff were present during this visit: myself   TELEHEALTH TELEPHONE VISIT  Chief Complaint: med refills   Holly Morin is a 50 year old /White female who is presenting for evaluation via telehealth doximity video visit attempted and pt has no way of doing the3 video or zoom visits--had to do the telephone visit. Verbal consent obtained before beginning visit.   Provider spent 8 minutes with patient during telehealth visit.   The following staff were present during this visit: myself   Past Medical History/Overview of Patient Symptoms     sent the link text and did the \"nudge\" call and had to leave a message--sounded like was husbands cell  waited approx. 3-4 min--and called the land line--home number --pt unable to do the zoom or the video appoint--had to use the home phone only   _____________________________________________________________________________________    (pt fearful of the COVID virus)    needing refills on medication for the following:    depression: pt been on the 75mg for a while now-and feels like it's wearing off by the end of the day--and then " "would like to increase maybe to 100mg     pt admits to still smoking--still smokes 1 PPD--in Oct called was having nausea--and ended up seeing gastro and then they Dx'd pt with pancreatitis and then pt soon will going for the CT scan F/U     pt also reports getting the tinea versicolor again and needs the selenium sufide Rx refilled    then also needs the GERD med refills as well       Physical Examination     ROS:  no fevers  no cough no congestion  no N/V/D  no CP no SOB no Cough  (+) JULEE-stable on med  (+) depression and no SI/HI and wants the dose increased slightly but not doubled  anxiety doing pretty good-per pt report  (+) sciatica--still but cannot tolerate the meds for this (baclofen)  (+) pt reports getting the \"tinea versicolor again\"     PE:  A&OX3  good historian  no pressured speech  no cough no breathlessness noted  answered all questions appropriately  calm and cordial           Assessment  · Anxiety disorder     300.00/F41.9  stable not taking the buspar any longer  · Major depressive disorder     296.20/F32.2  we will increase from the 75mg daily on the Effexor XR to the 37.5mg 3 tabs daily   · Nicotine dependence     305.1/F17.200  · Tobacco abuse counseling       Tobacco abuse counseling     V65.42/Z71.6  admits to still smoking and smoking 1 PPD and no longer using the Nicorette gum either   · Fear of contagion     300.29/F40.298  pt chose to do the telehealth call and had no way of doing the zoom or the video chat  · Sciatica     724.3/M54.30  pt reports still dealing with this and could not tolerate the baclofen   · Tinea versicolor     111.0/B36.0  sending in the refill Rx for this       Plan  · Orders  o ACO-17: Screened for tobacco use AND received tobacco cessation intervention (4004F) - 305.1/F17.200 - 11/24/2020  o ACO-17: Screened for tobacco use AND received tobacco cessation intervention (4004F) - V65.42/Z71.6 - 11/24/2020  o ACO-39: Current medications updated and reviewed (1739F, " ) - - 11/24/2020  o Physican Telephone evaluation, 5-10 min (47160) - - 11/24/2020  · Medications  o selenium sulfide 2.5 % topical lotion   SIG: apply to the affected area(s), lather, leave in place for 10 minutes and then rinse off with water by topical route once daily for 7 days   DISP: (118) Milliliter with 0 refills  Prescribed on 11/24/2020     o Tagamet  mg oral tablet   SIG: take 1 tablet (200 mg) by oral route 2 times per day 30 minutes before meals for 30 days   DISP: (60) Tablet with 5 refills  Adjusted on 11/24/2020     o venlafaxine 37.5 mg oral capsule,extended release 24hr   SIG: take 3 capsules by oral route daily for 30 days   DISP: (90) Capsule with 5 refills  Adjusted on 11/24/2020     o baclofen 20 mg oral tablet   SIG: take 1 tablet by oral route TID PRN LBP   DISP: (30) Tablet with 0 refills  Discontinued on 11/24/2020     o buspirone 30 mg oral tablet   SIG: take 1 tablet (30 mg) by oral route 2 times per day for 30 days   DISP: (60) Tablet with 5 refills  Discontinued on 11/24/2020     o Nicorette 2 mg buccal lozenge   SIG: take 1 tablet (2 mg) dissolved slowly in the mouth by oral route every 4-8 hours for 30 days   DISP: (90) Lozenge with 2 refills  Discontinued on 11/24/2020     o Medications have been Reconciled  o Transition of Care or Provider Policy  · Instructions  o Discussed the need for therapy, either with a certified counselor, psychologist, and/or family . If no improvement is noted or worsening of their condition, return to office or ER. But also discussed with patient that if they are non-responsive to the type of medication they may need to see a psychiatrist for further evaluation and management.  o Patient was given an SSRI/SSNRI medication and warned of possible side effects of the medication including potential for increased risk of suicidal thoughts and feelings. Patient was instructed that if they begin to exhibit any of these effects they will  "discontinue the medication immediately and contact our office or the ER ASAP.  o Patient agrees to a \"No Self Harm\" contract. Patient will either call us, 911, ER, [facility], [facility].  o Patient agrees to a \"No Self Harm\" contract. Patient will either call us, 911, ER, [facility], [facility].  o The patient and I discussed the need for therapy, either with a certified counselor, psychologist, and/or family . If no improvement is noted or worsening of their condition, return to office or ER. But also discussed with patient that if they are non-responsive to the type of medication they may need to see a psychiatrist for further evaluation and management.   o Patient was given an SSRI/SSNRI medication and warned of possible side effects of the medication including potential for increase risk of suicidal thoughts and feelings. Patient was instructed that if they begin to exhibit any of these effects they will discontinue the medication immediately and contact our office or the ER ASAP.   o *Form of nicotine being used: cigarettes   o Patient was strongly encouraged to discontinue use of any nicotine containing product or minimize the use of the product.  o Discussed smoking cessation and counseling with patient for over 3 minutes.  o Tobacco and smoking cessation counseling for more than 3 minutes was completed.  o Plan Of Care: refill s done today  o Chronic conditions reviewed and taken into consideration for today's treatment plan.  o Patient instructed to seek medical attention urgently for new or worsening symptoms.  o Patient was educated/instructed on their diagnosis, treatment and medications prior to discharge from the clinic today.  o Patient is taking medications as prescribed and doing well.   o Take all medications as prescribed/directed.  o Call the office with any concerns or questions.  o Risks, benefits, and alternatives were " discussed with the patient. The patient is aware of risks associated with: med mgt and the limits with telehealth visit   o Discussed Covid-19 precautions including, but not limited to, social distancing, avoid touching your face, and hand washing.   · Disposition  o Call or Return if symptoms worsen or persist.            Electronically Signed by: Julissa Garrison APRN -Author on November 24, 2020 01:11:52 PM

## 2021-05-07 NOTE — PROGRESS NOTES
Progress Note      Patient Name: Holly Morin   Patient ID: [id number]   Sex: Female   YOB: 1970        Visit Date: October 29, 2018    Provider: BRANDO Mott   Location: [facility]   Location Address: [address] [place] [address]  [id number]   Location Phone: [phone]          Chief Complaint     PATIENT IS HERE FOR REFILLS ON HER MEDICINE.      SHE WOULD LIKE THE FLU SHOT TODAY.      SHE HAS STARTED SMOKING AGAIN SINCE THE NEWS OF HER FATHER'S CANCER, SHE WOULD LIKE THE PATCHES TO HELP HER GOOD.       History Of Present Illness  Holly Morin is a 48 year old /White female who presents for evaluation and treatment of:      1) pt found out father was ill with cancer and home on the hospice care and pt restarted her smoking--and did well on the patches in the past --pancreatic cancer--    2) flu shot today    3) refills on meds--    4) and referral for the mammo-           Past Medical History  Disease Name Date Onset Notes   Abdominal pain, RUQ --  --    Anxiety --  --    Arthritis --  --    Bloating symptom --  --    Change in stool --  --    Colitis --  --    Colon Cancer --  --    Depression --  --    GERD (gastroesophageal reflux disease) --  --    Irritable Bowel Syndrome --  --    Seasonal allergies --  --          Past Surgical History  Procedure Name Date Notes   Cholecstectomy --  --    Cholecystecomy --  --    Gallbladder --  --    Hysterectomy --  --    Knee surgery --  --          Medication List  Name Date Started Instructions   baclofen 10 mg oral tablet 08/20/2018 take 1 tablet by oral route daily as needed   buspirone 5 mg oral tablet 09/01/2018 take 1 tablet (5 mg) by oral route 3 times per day PRN anxiety   cefdinir 300 mg oral capsule 09/26/2018 take 1 capsule (300 mg) by oral route every 12 hours for 10 days   etodolac 300 mg oral capsule 08/20/2018 take 1 capsule (300 mg) by oral route 2 times per day with food    etodolac 400 mg oral tablet 09/20/2018 TAKE 1 TABLET 3 times daily PRN pain   Linzess 145 mcg oral capsule 08/20/2018 take 1 capsule (145 mcg) by oral route once daily on an empty stomach at least 30 minutes before 1st meal of the day   montelukast 10 mg oral tablet 09/26/2018 take 1 tablet (10 mg) by oral route once daily in the evening   Mucinex DM  mg oral tablet extended release 12 hr 09/26/2018 take 1 - 2 tablets by oral route every 12 hours as needed   pantoprazole 40 mg oral tablet,delayed release (DR/EC) 08/20/2018 take 1 tablet (40 mg) by oral route once daily   ranitidine HCl 300 mg oral tablet 08/20/2018 take 1 tablet (300 mg) by oral route once daily at bedtime for 30 days   trazodone 50 mg oral tablet 08/20/2018 take 1 tablet (50 mg) by oral route once daily at bedtime   TRONVITE 1 MG TABS 1 TAB 09/22/2018 TAKE 1 TABLET BY MOUTH ONCE DAILY   venlafaxine 150 mg oral tablet extended release 24hr 08/20/2018 take 1 capsule 1daily with food   Vitamin D2 50,000 unit oral capsule 08/21/2018 take 1 capsule (50,000 unit) by oral route once weekly for 30 days         Allergy List  Allergen Name Date Reaction Notes   PENICILLINS --  --  --    SULFA (SULFONAMIDES) --  --  --          Family Medical History  Disease Name Relative/Age Notes   Alcohol Abuse / Father    Father/    Arthrtis / Father; Mother    Father/     Mother/    Asthma / Sister    Sister/    Cardiac Conduction Disorder / --    Depression / Father; Mother    Father/     Mother/    Diabetes mellitus, type II / --    DM Type II / Mother    Mother/    Hypertension / Father    Father/    Lung cancer / --    Mother, Grandmother, or Sister developed Heart Disease before the age of 65 / --    Osteoporosis / Mother    Mother/          Social History  Finding Status Start/Stop Quantity Notes   Alcohol Never --/-- --  never drinks alcohol   Caffeine --  --/-- --  --    Exercises regularly --  --/-- --  0 times per week    --  --/-- --  --   "  Recreational Drug Use Never --/-- --  never used   Tobacco Current every day --/-- 2 PPD SHE HAS STARTED SMOKING AGAIN SINCE THE NEWS ABOUT HER FATHERS CANCER.    Uses seatbelts --  --/-- --  yes         Immunizations  NameDate Admin Mfg Trade Name Lot Number Route Inj VIS Given VIS Publication   [id number]/01/2017 UNK Unknown TradeName [id number] NE NE 04/04/2018 08/07/2015   Comments:          Review of Systems  · Constitutional  o Denies  o : fever  · HENT  o Denies  o : vertigo, recent head injury  · Breasts  o Denies  o : lumps, tenderness, swelling, nipple discharge, abnormal changes in breast size, additional breast symptoms except as noted in the HPI  · Cardiovascular  o Denies  o : chest pain, irregular heart beats  · Respiratory  o Denies  o : shortness of breath, productive cough  · Gastrointestinal  o Denies  o : nausea, vomiting  · Genitourinary  o Denies  o : dysuria, urinary retention  · Integument  o Denies  o : hair growth change, new skin lesions  · Neurologic  o Denies  o : altered mental status, seizures  · Musculoskeletal  o Denies  o : joint swelling, limitation of motion  · Endocrine  o Denies  o : cold intolerance, heat intolerance  · Psychiatric  o Admits  o : anxiety, depression  o Denies  o : suicidal ideation, homicidal ideation  · Heme-Lymph  o Denies  o : petechiae, lymph node enlargement or tenderness  · Allergic-Immunologic  o Denies  o : frequent illnesses      Vitals  Date Time BP Position Site L\R Cuff Size HR RR TEMP(F) WT  HT  BMI kg/m2 BSA m2 O2 Sat        10/29/2018 03:25 /80 Sitting    114 - R  98.4 192lbs 1oz 5'  6.25\" 30.77 2.02 98 %           Physical Examination  · Constitutional  o Appearance  o : well developed, well-nourished, in no acute distress  · Head and Face  o HEENT  o : Unremarkable--(+) dentures  · Eyes  o Conjunctivae  o : conjunctivae normal  · Neck  o Inspection/Palpation  o : supple  o Thyroid  o : no thyromegaly  · Respiratory  o Respiratory " Effort  o : breathing unlabored  o Auscultation of Lungs  o : clear to ascultation  · Cardiovascular  o Heart  o :   § Auscultation of Heart  § : regular rate and rhythm  o Peripheral Vascular System  o :   § Extremities  § : no edema  · Gastrointestinal  o Abdominal Examination  o :   § Abdomen  § : soft nontender  · Lymphatic  o Neck  o : no lymphadenopathy present  · Musculoskeletal  o General  o :   § General Musculoskeletal  § : No joint swelling or deformity., Muscle tone, strength, and development grossly normal.  · Skin and Subcutaneous Tissue  o General Inspection  o : skin turgor normal, texture normal  · Neurologic  o Gait and Station  o :   § Gait Screening  § : normal gait  · Psychiatric  o Mood and Affect  o : mood normal, affect appropriate              Assessment  · Need for influenza vaccination     V04.81/Z23  · Visit for screening mammogram     V76.12/Z12.31  · Anxiety disorder     300.00/F41.9  · Hyperlipidemia     272.4/E78.5  · Tobacco abuse counseling       Tobacco abuse counseling     V65.42/Z71.6  · Vitamin D deficiency     268.9/E55.9      Plan  · Orders  o Screening Mammogram 2D Bilateral (45855, ) - V76.12/Z12.31 - 10/29/2018  o CMP [facility] (78762) - 272.4/E78.5 - 10/29/2018  o Lipid Panel [facility] (47195) - 272.4/E78.5 - 10/29/2018  o Smoking cessation counseling, 3-10 minutes [facility] (75841) - V65.42/Z71.6 - 10/29/2018  o ACO-17: Screened for tobacco use AND received tobacco cessation intervention (4004F) - V65.42/Z71.6 - 10/29/2018  o Vitamin D Level (55852) - 268.9/E55.9 - 10/29/2018  o Immunization Admin Fee (Single) ([facility]) (41937) - V04.81/Z23 - 10/29/2018  o ACO-14: Influenza immunization administered or previously received () - - 10/29/2018  o ACO-39: Current medications updated and reviewed () - - 10/29/2018  o Fluzone Quadrivalent Vaccine, age 3+ (90954) - V04.81/Z23 - 10/29/2018   Vaccine - Influenza; Dose: 0.5; Site: Right Upper Arm; Route: Intramuscular; Date: 10/29/2018  15:54:00; Exp: 06/30/2019; Lot: [id number]; Mfg: [employer]i pasteur; TradeName: Fluzone Quadrivalent; Administered By: Kayla Quinn MA; Comment: NDC [phone]  · Medications  o nicotine 21 mg/24 hr transdermal patch 24 hour   SIG: apply 1 patch (21 mg) by transdermal route once daily and remove at bedtime for 30 days   DISP: (30) patches with 1 refills  Prescribed on 10/29/2018     o buspirone 5 mg oral tablet   SIG: take 1 tablet (5 mg) by oral route 3 times per day PRN anxiety   DISP: (90) tablet with 3 refills  Adjusted on 10/29/2018     o cefdinir 300 mg oral capsule   SIG: take 1 capsule (300 mg) by oral route every 12 hours for 10 days   DISP: (20) capsules with 0 refills  Discontinued on 10/29/2018     o Mucinex DM  mg oral tablet extended release 12 hr   SIG: take 1 - 2 tablets by oral route every 12 hours as needed   DISP: (40) tablets with 0 refills  Discontinued on 10/29/2018     o Vitamin D2 50,000 unit oral capsule   SIG: take 1 capsule (50,000 unit) by oral route once weekly for 30 days   DISP: (4) capsules with 2 refills  Discontinued on 10/29/2018     · Instructions  o Advised that cheeses and other sources of dairy fats, animal fats, fast food, and the extras (candy, pasteries, pies, doughnuts and cookies) all contain LDL raising nutrients. Advised to increase fruits, vegetables, whole grains, and to monitor portion sizes.   o Tobacco and smoking cessation counseling for more than 3 minutes was completed.  o Patient was educated/instructed on their diagnosis, treatment and medications prior to discharge from the clinic today.            Electronically Signed by: BRANDO Mott -Author on October 29, 2018 04:28:03 PM

## 2021-05-07 NOTE — H&P
History and Physical      Patient Name: Holly Morin   Patient ID: [id number]   Sex: Female   YOB: 1970    Referring Provider: Julissa MICHAELS    Visit Date: March 26, 2018    Provider: BRANDO Mott   Location: [facility]   Location Address: [address] [place] [address]  [id number]   Location Phone: [phone]          Chief Complaint  · Annual Exam  · PAP exam  · (Health Maintainence Information Reviewed Under Results)            History Of Present Illness  Last PAP Smear: 03/26/2014.   Date of Last Mammogram: 10/18/2017.   Date of Last Colonoscopy: 03/13/2013   No current complaints.   Holly Morin is a 47 year old /White female who presents for evaluation and treatment of:      here for the pap smear--just had her mammo in the fall and they do this yearly--    been approx. 4 yrs at least since her last pap was normal--    pt had went 3 weeks without the BMs and then DR Pineda had her to do a scan and make sure no blockage then started the linzess last yr     saw DR Hernandez last time 2016--no scope done     then saw Dr Lu last yr--no scope done    then pt's last cscope 2013 per DR Monico Menjivar--no polyps     pt with a prior partial hysterectomy and thought had something swelled like a cyst or something--at the vaginal opening-Hx of ovarian cysts as well     adds lower back pain as well to the left lower buttock area and then radiates down the buttock and the leg at times--    received the flu shot 11/1/17--per DR Don notes       Past Medical History  Disease Name Date Onset Notes   Abdominal pain, RUQ --  --    Anxiety --  --    Arthritis --  --    Bloating symptom --  --    Change in stool --  --    Colitis --  --    Colon Cancer --  --    Depression --  --    GERD (gastroesophageal reflux disease) --  --    Irritable Bowel Syndrome --  --    Seasonal allergies --  --          Past Surgical History  Procedure Name Date  Notes   Cholecstectomy --  --    Cholecystecomy --  --    Gallbladder --  --    Hysterectomy --  --    Knee surgery --  --          Medication List  Name Date Started Instructions   amitriptyline 75 mg oral tablet 01/29/2018 take 1 tablet (75 mg) by oral route once daily at bedtime   buspirone 5 mg oral tablet  take 1 tablet (5 mg) by oral route 3 times per day   Diflucan 150 mg oral tablet 03/12/2018 take 1 tablet (150 mg) by oral route once for 1 day   etodolac 400 mg oral tablet 02/26/2018 TAKE 1 TABLET 3 times daily PRN pain   Linzess 72 mcg oral capsule  take 1 capsule (72 mcg) by oral route once daily on an empty stomach at least 30 minutes before 1st meal of the day   Macrobid 100 mg oral capsule 03/12/2018 take 1 capsule (100 mg) by oral route every 12 hours with food for 7 days   ondansetron HCl 4 mg oral tablet 01/18/2018 take 1 tablet twice daily for nausea   pantoprazole 40 mg oral tablet,delayed release (DR/EC)  take 1 tablet (40 mg) by oral route once daily   Probiotic 10 billion cell oral capsule 01/18/2018 take 1 capsule by oral route QD for antibiotic use   ranitidine HCl 300 mg oral tablet 01/18/2018 take 1 tablet (300 mg) by oral route once daily at bedtime   venlafaxine 150 mg oral tablet extended release 24hr 01/18/2018 take 1 capsule 1daily with food         Allergy List  Allergen Name Date Reaction Notes   PENICILLINS --  --  --    SULFA (SULFONAMIDES) --  --  --          Family Medical History  Disease Name Relative/Age Notes   Alcohol Abuse / Father    Father/    Arthrtis / Father; Mother    Father/     Mother/    Asthma / Sister    Sister/    Cardiac Conduction Disorder / --    Depression / Father; Mother    Father/     Mother/    Diabetes mellitus, type II / --    DM Type II / Mother    Mother/    Hypertension / Father    Father/    Lung cancer / --    Mother, Grandmother, or Sister developed Heart Disease before the age of 65 / --    Osteoporosis / Mother    Mother/          Social  History  Finding Status Start/Stop Quantity Notes   Alcohol Never --/-- --  never drinks alcohol   Caffeine --  --/-- --  --    Exercises regularly --  --/-- --  0 times per week    --  --/-- --  --    Recreational Drug Use Never --/-- --  never used   Tobacco Former --/-- 2 PPD former smoker, smokes 1 to 2 packs per day, for 10 years, never uses other tobacco products   Uses seatbelts --  --/-- --  yes         Review of Systems  · Constitutional  o Denies  o : appetite change, fatigue, night sweats, weight gain, weight loss  · HENT  o Denies  o : hearing loss, tinnitus, vertigo, nasal discharge, nose bleeding, dental problems, oral lesions, sore throat  · Breasts  o Denies  o : lumps, tenderness, swelling, nipple discharge  · Cardiovascular  o Denies  o : chest pain, decreased exercise tolerance, dyspnea on exertion, palpitations  · Respiratory  o Denies  o : cough, shortness of breath, wheezing, snoring, apneas  · Gastrointestinal  o Admits  o : still having constipation and straining and blood with stools--the stool beigy brown--pt on the linzess--   · Genitourinary  o Denies  o : dysuria, hematuria, incontinence, nocturia, frequency, urgency, sexual dysfunction  · Neurologic  o Denies  o : abnormal gait, facial weakness, headache, memory difficulties, tingling or numbness, seizures, tremors  · Musculoskeletal  o Admits  o : joint pain, back pain  o Denies  o : joint swelling, limitation of motion  · Psychiatric  o Denies  o : anxiety, decreased concentration, irritability, panic attacks, sleep distrubances, sadness/tearfulness      Vitals  Date Time BP Position Site L\R Cuff Size HR RR TEMP(F) WT  HT  BMI kg/m2 BSA m2 O2 Sat HC       03/26/2018 02:11 /82 Sitting    78 - R  97.8 183lbs 0oz    98 %           Physical Examination  · Constitutional  o Appearance  o : well-nourished, in no acute distress  · Neck  o Inspection/Palpation  o : normal appearance, no masses or tenderness, trachea  midline  o Range of Motion  o : cervical range of motion within normal limits  o Thyroid  o : gland size normal, nontender, no nodules or masses present on palpation  · Respiratory  o Respiratory Effort  o : breathing unlabored  o Inspection of Chest  o : normal appearance  o Auscultation of Lungs  o : normal breath sounds throughout  · Cardiovascular  o Heart  o :   § Auscultation of Heart  § : regular rate and rhythm, no murmurs, gallops or rubs  o Peripheral Vascular System  o :   § Carotid Arteries  § : normal pulses bilaterally, no bruits present  § Pedal Pulses  § : pulses 2 bilaterally  § Extremities  § : no edema  · Breasts  o Inspection of Breasts  o : breasts symmetrical, no skin changes, no deformities present, no discharge present  o Palpation of Breasts, Axillae  o : no masses present on palpation, no breast tenderness  · Gastrointestinal  o Abdominal Examination  o : abdomen nontender to palpation, tone normal without rigidity or guarding, no masses present, normal bowel sounds  · Genitourinary  o External Genitalia  o : no inflammation, no lesions present--ther was a (+) small tender knot to the left lower area of the introitus of the vagina   o Vagina  o : normal vaginal vault, no discharge present, no inflammatory lesions present, no masses present--deep into the left aspect of the vagina there is a small cystlike lesion and tenderness   o Urethra  o :   § Urethral Meatus  § : no inflammation present  § Urethral Body  § : urethra palpation normal  o Bladder  o : nontender to palpation  o Cervix  o : no cervix--pt had partial hysterectomy   o Uterus  o : partial hysterectomy   o Adnexa  o : no tenderness or masses present on bimanual examination--except to the left side (+) on exam   o Anus  o : no inflammation or lesions present--(+) external hemorrhoids present nothing thrombosed   o Perineum  o : perineum within normal limits  · Lymphatic  o Neck  o : no lymphadenopathy present  o Axilla  o : no  lymphadenopathy present  o Groin  o : no lymphadenopathy present  · Neurologic  o Mental Status Examination  o :   § Orientation  § : grossly oriented to person, place and time  o Gait and Station  o : normal gait, able to stand without difficulty  · Psychiatric  o Judgement and Insight  o : judgment and insight intact  o Mood and Affect  o : mood normal, affect appropriate     (+) point tenderness to the left lower aspect of the left buttock and left SI area and radiates into the buttock and downwards with certain positions on the exam table               Assessment  · Normal Pap Smear       Encounter for gynecological examination (general) (routine) without abnormal findings     V76.2/Z01.419  · Routine gynecological examination     V72.31/Z01.419  · Vaginal Discharge     623.5/N89.8  · Follow up     V67.9/Z09  · UTI (urinary tract infection)     599.0/N39.0  · Sciatica     724.3/M54.30  · Bartholin cyst     616.2/N75.0  · Left adnexal tenderness     625.9/R10.2      Plan  · Orders  o Vaginal Screen (Candida, Gardnerella & Trichomonas) (32804) - V76.2/Z01.419, 616.2/N75.0, 625.9/R10.2, 623.5/N89.8 - 04/04/2018  o Pap smear (83911) - V72.31/Z01.419 - 04/04/2018  o IOP - Urinalysis (Clinitek) with Microscopy (36487) - V67.9/Z09, 599.0/N39.0 - 03/26/2018   GLU NEG ALBERT NEG KET NEG SG 1.010 BLO NEG PH 7.0 PRO NEG URO 0.2 NIT NEG SELVIN SMALL  o Urine culture and sensitivity (78453, 83334) - V67.9/Z09, 599.0/N39.0 - 03/26/2018  o Transvaginal U/S (47954) - 625.9/R10.2 - 03/26/2018  o Bacterial Vaginosis Panel by MORGAN (Requires special collection tube) (77699) - V76.2/Z01.419, 616.2/N75.0, 625.9/R10.2, 623.5/N89.8 - 03/26/2018  o ACO-39: Current medications updated and reviewed () - - 04/04/2018  o Influenza immunization was ordered or administered () - - 04/04/2018 11/1/17--per DR Don notes   · Medications  o Medrol (Behzad) 4 mg oral tablets,dose pack   SIG: take as directed for 6 days   DISP: (1) 21 ct  dose-pack with 0 refills  Prescribed on 03/26/2018     o Zanaflex 4 mg oral tablet   SIG: take 1 tablet by oral route BID PRN muscle spasms or lower back pain   DISP: (28) tablets with 0 refills  Prescribed on 03/26/2018     o clindamycin HCl 300 mg oral capsule   SIG: take 1 capsule (300 mg) by oral route every 6 hours for 7 days   DISP: (28) capsules with 0 refills  Prescribed on 03/26/2018     · Instructions  o Counseled on monthly breast self exams.   o Counseled on STD prevention.  o Counseled on diet and exercise.   o Counseled on weight-bearing exercise.  o Recommended Calcium with Vitamin D twice daily.  o Patient was educated/instructed on their diagnosis, treatment and medications prior to discharge from the clinic today.  · Disposition  o Call or Return if symptoms worsen or persist.            Electronically Signed by: BRANDO Mott -Author on April 4, 2018 07:55:07 AM

## 2021-05-07 NOTE — PROGRESS NOTES
Progress Note      Patient Name: Holly Morin   Patient ID: [id number]   Sex: Female   YOB: 1970        Visit Date: June 23, 2020    Provider: ASAF Mott   Location: [facility]   Location Address: [address] [place] [address] [place]  [address]   Location Phone: [phone]          Chief Complaint     PATIENT IS HERE FOR A POSSIBLE UTI.  SHE WOULD LIKE FOR YOU TO LOOK AT HER CBC FROM ANOTHER DOCTOR ALSO.       History Of Present Illness  Holly Morin is a 49 year old /White female who presents for evaluation and treatment of:      pt here for the possible UTI--pt was at the hematologist 2 weeks ago--and they did a UA and showed nitrates --and was told F/U her for this--pt reports dribbling and freq  and dysuria    then the CBC was drawn there too--and pt reports the ferritin was 193--their goal was to keep it under the 200 martha--and pt was told is hemochromatosis but non-hereditary  --it's from a mutated gene --which is rare but they treat it the same way--    pt does have the Hx of the fatty liver--seen the gastro for this--they monitor this--    pt does have the DDD of the cervical and lumbar (L4-5 and L5-S1) spine and sees DR Guicho Santiago[facility] for this    and pt reports in 2018 was seen int he ER dept for bronchitis and never been told has COPD--denies    GERD--pt reports has it daily and not been on anything since the zantac --    also pt brought wither the CT scan of the Lsine but there were several abnormals on it needing to be addressed:  bladder wall thickening  abnormality of the ureter  kidney cyst  mild atherosclerotic dz     also pt needs Rx refilled on the nicotine lozenges    refills on the buspar and the effexor and pt was able to wean self off the trazodone-- anxiety and depression--stable     also pt with another cyst or infection of the lower left breast--again --started 2 days ago       Past Medical History  Disease Name  Date Onset Notes   Abdominal pain, RUQ --  --    Anxiety --  --    Arthritis --  --    Bloating symptom --  --    Change in stool --  --    Colitis --  --    Colon Cancer --  --    Depression --  --    DJD (degenerative joint disease) of knee --  --    GERD (gastroesophageal reflux disease) --  --    Irritable bowel syndrome --  --    Seasonal allergies --  --          Past Surgical History  Procedure Name Date Notes   Cholecstectomy --  --    Cholecystecomy --  --    Gallbladder --  --    Hysterectomy --  --    Knee surgery --  --          Medication List  Name Date Started Instructions   baclofen 20 mg oral tablet 11/04/2019 take 1 tablet by oral route 3 times a day as needed for LBP or knee pain   buspirone 30 mg oral tablet 04/21/2020 take 1 tablet (30 mg) by oral route 2 times per day for 30 days   Creon 36,000-114,000- 180,000 unit oral capsule,delayed release(DR/EC)  take 2 capsules by oral route 3 times per day with meals and 1 capsule with each snack swallowing whole. Do not crush, chew and/or divide.   nicotine 21 mg/24 hr transdermal patch 24 hour 11/13/2019 apply 1 patch (21 mg) by transdermal route once daily and remove at bedtime for 30 days   ondansetron HCl 4 mg oral tablet 12/23/2019 TAKE ONE TABLET BY MOUTH TWICE DAILY AS NEEDED FOR NAUSEA AND VOMITING   pantoprazole 40 mg oral tablet,delayed release (DR/EC) 08/20/2018 take 1 tablet (40 mg) by oral route once daily   trazodone 100 mg oral tablet 11/19/2019 take 1 tablet (100 mg) by oral route once daily at bedtime for 30 days   venlafaxine 75 mg oral tablet 05/14/2020 take 1 tablet by oral route daily   Ventolin HFA 90 mcg/actuation inhalation HFA aerosol inhaler 11/19/2019 inhale 1 - 2 puffs (90 - 180 mcg) by inhalation route every 6 hours as needed for 30 days   Vitamin D2 50,000 unit oral capsule 04/11/2019 take 1 capsule (50,000 unit) by oral route once weekly for 30 days         Allergy List  Allergen Name Date Reaction Notes   PENICILLINS --   --  --    SULFA (SULFONAMIDES) --  --  --          Family Medical History  Disease Name Relative/Age Notes   Asthma Sister/   Sister   Depression Father/  Mother/   Father; Mother   DM Type II Mother/   Mother   Cardiac Conduction Disorder  --    Hypertension Father/   Father   Lung cancer  --    Diabetes mellitus, type II  --    Arthrtis Father/  Mother/   Father; Mother   Osteoporosis Mother/   Mother   Alcohol abuse Father/   Father   Mother, Grandmother, or Sister developed Heart Disease before the age of 65  --          Social History  Finding Status Start/Stop Quantity Notes   Alcohol Never --/-- --  never drinks alcohol   Caffeine --  --/-- --  --    Exercises regularly --  --/-- --  0 times per week    --  --/-- --  --    Recreational Drug Use Never --/-- --  never used   Tobacco Current every day --/-- 2 PPD --    Uses seatbelts --  --/-- --  yes         Immunizations  NameDate Admin Mfg Trade Name Lot Number Route Inj VIS Given VIS Publication   Gmfojbpns84/19/2019 [employer] FLUARIX [id number]   11/19/2019    Comments: NDC [id number]   Hezxbjzlc76/29/2018 PMC FLUARIX [id number]   10/29/2018 08/07/2015   Comments: NDC [id number]   Hlxxotyhh02/01/2017 UNK Unknown TradeName [id number] NE NE 04/04/2018 08/07/2015   Comments:          Review of Systems  · Constitutional  o Denies  o : fatigue, fever  · HENT  o Denies  o : vertigo, recent head injury  · Cardiovascular  o Denies  o : chest pain, irregular heart beats  · Respiratory  o Denies  o : shortness of breath, productive cough  · Gastrointestinal  o Denies  o : nausea, vomiting  · Genitourinary  o Admits  o : frequency, dysuria, urinary hesitancy  · Integument  o Admits  o : new skin lesions, additional integumentary symptoms except as noted in the HPI  o Denies  o : rash  · Neurologic  o Admits  o : tingling or numbness  o Denies  o : altered mental status, seizures  · Musculoskeletal  o Admits  o : joint pain, neck pain, back pain, additional  "musculoskeletal symptoms except as noted in the HPI, reviewed and unchanged  o Denies  o : joint swelling, limitation of motion  · Endocrine  o Denies  o : cold intolerance, heat intolerance  · Psychiatric  o Admits  o : anxiety, depression  o Denies  o : suicidal ideation, homicidal ideation  · Heme-Lymph  o Denies  o : petechiae, lymph node enlargement or tenderness  · Allergic-Immunologic  o Denies  o : frequent illnesses      Vitals  Date Time BP Position Site L\R Cuff Size HR RR TEMP (F) WT  HT  BMI kg/m2 BSA m2 O2 Sat        06/23/2020 01:51 /64 Sitting    118 - R  98.4 174lbs 8oz 5'  6.25\" 27.95 1.92 97 %          Physical Examination  · Constitutional  o Appearance  o : well developed, well-nourished, in no acute distress  · Head and Face  o HEENT  o : Unremarkable wearing her mask   · Eyes  o Conjunctivae  o : conjunctivae normal  · Neck  o Inspection/Palpation  o : supple  o Thyroid  o : no thyromegaly  · Respiratory  o Respiratory Effort  o : breathing unlabored  o Auscultation of Lungs  o : clear to ascultation  · Cardiovascular  o Heart  o :   § Auscultation of Heart  § : regular rate and rhythm  o Peripheral Vascular System  o :   § Extremities  § : no edema  · Breasts  o Inspection of Breasts  o : at the mid section at base of left breast a small peasized red tender cyst like area and still soft and not very pronounced at this time-appears to just be starting   · Gastrointestinal  o Abdominal Examination  o :   § Abdomen  § : soft nontender  · Lymphatic  o Neck  o : no lymphadenopathy present  · Musculoskeletal  o General  o :   § General Musculoskeletal  § : chronic (+) PTTP of the neck and lspine and radiates to the legs   · Skin and Subcutaneous Tissue  o General Inspection  o : skin turgor normal, texture normal  · Neurologic  o Gait and Station  o :   § Gait Screening  § : normal gait  · Psychiatric  o Mood and Affect  o : mood normal, affect appropriate              Assessment  · Anxiety " disorder     300.00/F41.9  · Lumbago     724.2/M54.5  · Major depressive disorder     296.20/F32.2  · Tobacco abuse counseling       Tobacco abuse counseling     V65.42/Z71.6  · Frequency of urination     788.41/R35.0  · Renal cyst     753.10/N28.1  · Family history of polycystic kidney     V18.61/Z82.71  · Abnormal CT scan     793.99/R93.89  · Bladder wall thickening     596.89/N32.89  · Dysuria     788.1/R30.0  · Abnormal urine finding     791.9/R82.90  · Skin infection     686.9/L08.9  · Breast infection     611.0/N61.0  · Screening, lipid     V77.91/Z13.220  · Medication management     V58.69/Z79.899  · Atherosclerosis     440.9/I70.90      Plan  · Orders  o Smoking cessation counseling, 3-10 minutes [facility] (93176) - V65.42/Z71.6 - 06/23/2020  o ACO-17: Screened for tobacco use AND received tobacco cessation intervention (4004F) - V65.42/Z71.6 - 06/23/2020  o CMP [facility] (75115) - 791.9/R82.90, V58.69/Z79.899 - 06/23/2020  o Lipid Panel [facility] (11584) - V77.91/Z13.220, V58.69/Z79.899, 440.9/I70.90 - 06/23/2020  o Urine culture (69283, 38291) - 788.1/R30.0, 791.9/R82.90 - 06/23/2020  o ACO-18: Positive screen for clinical depression using a standardized tool and a follow-up plan documented () - - 06/23/2020   12--improved   o ACO-19: Colorectal cancer screening results documented and reviewed (3017F) - - 06/23/2020   2018 cscope   o ACO-20: Screening Mammography documented and reviewed () - - 06/23/2020 2019 normal   o ACO-39: Current medications updated and reviewed () - - 06/23/2020  o IOP - Urinalysis without Microscopy (Clinitek) [facility] (00518) - 788.41/R35.0, 788.1/R30.0, 791.9/R82.90 - 06/23/2020   GLU (-) Kee small Ket trace SG 1.025 Blood (-) pH 6.5 Pro trace URO 1.0 EU Nit (-) Sally (-)   o US kidney and ureters (83968) - 753.10/N28.1, V18.61/Z82.71, 793.99/R93.89 - 06/23/2020   per CT scan see copies scanned in and there was also an abnormality of the ureter as well   o UROLOGY CONSULTATION (UROLO) -  793.99/R93.89, 596.89/N32.89, 788.41/R35.0 - 06/23/2020   per CT scan--see copy scanned in   · Medications  o Nicorette 2 mg buccal lozenge   SIG: take 1 tablet (2 mg) dissolved slowly in the mouth by oral route every 4-8 hours for 30 days   DISP: (90) Lozenge with 2 refills  Prescribed on 06/23/2020     o famotidine 40 mg oral tablet   SIG: take 1 tablet (40 mg) by oral route once daily at bedtime for 30 days   DISP: (30) tablets with 6 refills  Prescribed on 06/23/2020     o doxycycline monohydrate 100 mg oral tablet   SIG: take 1 tablet (100 mg) by oral route 2 times per day for 10 days   DISP: (20) tablets with 0 refills  Prescribed on 06/23/2020     o buspirone 30 mg oral tablet   SIG: take 1 tablet (30 mg) by oral route 2 times per day for 30 days   DISP: (60) Tablet with 5 refills  Adjusted on 06/23/2020     o venlafaxine 75 mg oral tablet   SIG: take 1 tablet by oral route daily   DISP: (30) Tablet with 5 refills  Adjusted on 06/23/2020     o ondansetron HCl 4 mg oral tablet   SIG: TAKE ONE TABLET BY MOUTH TWICE DAILY AS NEEDED FOR NAUSEA AND VOMITING   DISP: (60) Tablet with 0 refills  Discontinued on 06/23/2020     o pantoprazole 40 mg oral tablet,delayed release (DR/EC)   SIG: take 1 tablet (40 mg) by oral route once daily   DISP: (30) tablet with 5 refills  Discontinued on 06/23/2020     o trazodone 100 mg oral tablet   SIG: take 1 tablet (100 mg) by oral route once daily at bedtime for 30 days   DISP: (30) tablets with 2 refills  Discontinued on 06/23/2020     o Medications have been Reconciled  o Transition of Care or Provider Policy  · Instructions  o Discussed the need for therapy, either with a certified counselor, psychologist, and/or family . If no improvement is noted or worsening of their condition, return to office or ER. But also discussed with patient that if they are non-responsive to the type of medication they may need to see a psychaitrist for further evaluation and management.  "  o Patient was given an SSRI/SSNRI medication and warned of possible side effects of the medication including potential for increase risk of sucicidal thoughts and feelings. Patient was instructed that if they begin to exhibit any of these effects they will discontinue the medication immediately and contact our office or the ER ASAP.   o Patient agrees to a \"No Self Harm\" contract. Patient will either call us, 911, ER, [facility], [facility].  o Patient agrees to a \"No Self Harm\" contract. Patient will either call us, 911, ER, [facility], [facility].  o The patient and I discussed the need for therapy, either with a certified counselor, psychologist, and/or family . If no improvement is noted or worsening of their condition, return to office or ER. But also discussed with patient that if they are non-responsive to the type of medication they may need to see a psychaitrist for further evaluation and management.   o Patient was given an SSRI/SSNRI medication and warned of possible side effects of the medication including potential for increase risk of sucicidal thoughts and feelings. Patient was instructed that if they begin to exhibit any of these effects they will discontinue the medication immediately and contact our office or the ER ASAP.   o Tobacco and smoking cessation counseling for more than 3 minutes was completed.  o Take all medications as prescribed/directed.  o Patient was educated/instructed on their diagnosis, treatment and medications prior to discharge from the clinic today.  o Patient counseled to stop smoking.  o Patient instructed to seek medical attention urgently for new or worsening symptoms.  o Call the office with any concerns or questions.  o Risks, benefits, and alternatives were discussed with the patient. The patient is aware of risks associated with: med mgt  o Chronic conditions reviewed and taken into " consideration for today's treatment plan.  · Disposition  o Call or Return if symptoms worsen or persist.  o Return Visit Request in/on 6 months +/- 2 days (7009).            Electronically Signed by: ASAF Mott -Author on June 23, 2020 02:47:03 PM

## 2021-05-09 VITALS
BODY MASS INDEX: 28.04 KG/M2 | HEIGHT: 66 IN | HEART RATE: 118 BPM | WEIGHT: 174.5 LBS | DIASTOLIC BLOOD PRESSURE: 64 MMHG | SYSTOLIC BLOOD PRESSURE: 124 MMHG | TEMPERATURE: 98.4 F | OXYGEN SATURATION: 97 %

## 2021-05-09 VITALS
TEMPERATURE: 98.2 F | SYSTOLIC BLOOD PRESSURE: 138 MMHG | WEIGHT: 182.19 LBS | HEIGHT: 66 IN | BODY MASS INDEX: 29.28 KG/M2 | HEART RATE: 112 BPM | DIASTOLIC BLOOD PRESSURE: 86 MMHG | OXYGEN SATURATION: 98 %

## 2021-05-09 VITALS
SYSTOLIC BLOOD PRESSURE: 155 MMHG | HEART RATE: 117 BPM | DIASTOLIC BLOOD PRESSURE: 96 MMHG | TEMPERATURE: 97.9 F | WEIGHT: 177 LBS | OXYGEN SATURATION: 98 %

## 2021-05-09 VITALS
HEIGHT: 66 IN | SYSTOLIC BLOOD PRESSURE: 138 MMHG | DIASTOLIC BLOOD PRESSURE: 86 MMHG | BODY MASS INDEX: 32.62 KG/M2 | WEIGHT: 203 LBS | OXYGEN SATURATION: 96 % | TEMPERATURE: 97.8 F | HEART RATE: 93 BPM

## 2021-05-09 VITALS
TEMPERATURE: 97.8 F | SYSTOLIC BLOOD PRESSURE: 184 MMHG | HEART RATE: 96 BPM | WEIGHT: 200 LBS | DIASTOLIC BLOOD PRESSURE: 100 MMHG | OXYGEN SATURATION: 96 %

## 2021-05-09 VITALS
WEIGHT: 176.5 LBS | TEMPERATURE: 98 F | HEART RATE: 120 BPM | SYSTOLIC BLOOD PRESSURE: 112 MMHG | DIASTOLIC BLOOD PRESSURE: 74 MMHG | HEIGHT: 66 IN | BODY MASS INDEX: 28.37 KG/M2 | OXYGEN SATURATION: 99 %

## 2021-05-09 VITALS
WEIGHT: 203 LBS | OXYGEN SATURATION: 93 % | TEMPERATURE: 98 F | HEIGHT: 66 IN | HEART RATE: 120 BPM | SYSTOLIC BLOOD PRESSURE: 120 MMHG | BODY MASS INDEX: 32.62 KG/M2 | DIASTOLIC BLOOD PRESSURE: 70 MMHG

## 2021-05-09 VITALS
SYSTOLIC BLOOD PRESSURE: 130 MMHG | OXYGEN SATURATION: 98 % | WEIGHT: 184 LBS | TEMPERATURE: 98.2 F | DIASTOLIC BLOOD PRESSURE: 82 MMHG | HEART RATE: 110 BPM

## 2021-05-09 VITALS
DIASTOLIC BLOOD PRESSURE: 110 MMHG | SYSTOLIC BLOOD PRESSURE: 180 MMHG | HEART RATE: 126 BPM | DIASTOLIC BLOOD PRESSURE: 86 MMHG | OXYGEN SATURATION: 98 % | WEIGHT: 194 LBS | TEMPERATURE: 98.8 F | SYSTOLIC BLOOD PRESSURE: 120 MMHG

## 2021-05-09 VITALS
OXYGEN SATURATION: 99 % | HEIGHT: 65 IN | WEIGHT: 151 LBS | HEART RATE: 70 BPM | BODY MASS INDEX: 25.16 KG/M2 | TEMPERATURE: 97.1 F | DIASTOLIC BLOOD PRESSURE: 88 MMHG | SYSTOLIC BLOOD PRESSURE: 138 MMHG

## 2021-05-09 VITALS
SYSTOLIC BLOOD PRESSURE: 120 MMHG | DIASTOLIC BLOOD PRESSURE: 82 MMHG | TEMPERATURE: 97.8 F | HEART RATE: 78 BPM | OXYGEN SATURATION: 98 % | WEIGHT: 183 LBS

## 2021-05-09 VITALS
OXYGEN SATURATION: 97 % | TEMPERATURE: 97.4 F | DIASTOLIC BLOOD PRESSURE: 80 MMHG | SYSTOLIC BLOOD PRESSURE: 110 MMHG | HEART RATE: 109 BPM | WEIGHT: 175 LBS

## 2021-05-09 VITALS
SYSTOLIC BLOOD PRESSURE: 128 MMHG | BODY MASS INDEX: 30.95 KG/M2 | OXYGEN SATURATION: 97 % | HEART RATE: 116 BPM | WEIGHT: 192.56 LBS | TEMPERATURE: 96.6 F | DIASTOLIC BLOOD PRESSURE: 64 MMHG | HEIGHT: 66 IN

## 2021-05-09 VITALS
TEMPERATURE: 97.4 F | DIASTOLIC BLOOD PRESSURE: 86 MMHG | OXYGEN SATURATION: 98 % | WEIGHT: 196.5 LBS | HEART RATE: 102 BPM | SYSTOLIC BLOOD PRESSURE: 128 MMHG

## 2021-05-09 VITALS
SYSTOLIC BLOOD PRESSURE: 142 MMHG | TEMPERATURE: 98 F | DIASTOLIC BLOOD PRESSURE: 90 MMHG | HEART RATE: 102 BPM | WEIGHT: 188.06 LBS | OXYGEN SATURATION: 98 %

## 2021-05-09 VITALS
DIASTOLIC BLOOD PRESSURE: 80 MMHG | WEIGHT: 192.06 LBS | BODY MASS INDEX: 30.87 KG/M2 | SYSTOLIC BLOOD PRESSURE: 128 MMHG | HEIGHT: 66 IN | HEART RATE: 114 BPM | OXYGEN SATURATION: 98 % | TEMPERATURE: 98.4 F

## 2021-05-09 VITALS
WEIGHT: 195.44 LBS | SYSTOLIC BLOOD PRESSURE: 142 MMHG | OXYGEN SATURATION: 96 % | DIASTOLIC BLOOD PRESSURE: 98 MMHG | HEART RATE: 140 BPM

## 2021-05-09 VITALS
DIASTOLIC BLOOD PRESSURE: 96 MMHG | WEIGHT: 203.44 LBS | OXYGEN SATURATION: 96 % | HEART RATE: 113 BPM | TEMPERATURE: 97.6 F | SYSTOLIC BLOOD PRESSURE: 130 MMHG

## 2021-05-10 NOTE — H&P
History and Physical      Patient Name: Holly Morin   Patient ID: [id number]   Sex: Female   YOB: 1970    Primary Care Provider: Julissa MICHAELS   Referring Provider: Julissa MICHAELS    Visit Date: June 30, 2020    Provider: Sydney Simmons MD   Location: [facility]   Location Address: [address] [place] [address]  [id number]   Location Phone: [phone]          Chief Complaint  · Pt here today for urological concerns      History Of Present Illness     49-year-old lady presents for further evaluation of bladder wall thickening, CT scan findings.  Patient was evaluated by PCP, 6/23/2020, for possible UTI.  Patient complained of difficulty urinating, frequency, urgency, and pelvic pressure.  Patient notes the symptoms to be minimal at baseline.  UA nitrite positive however urine culture has been negative x2.  CT scan obtained by primary indicates diffuse bladder wall thickening as well as bilateral ureteral enhancement; no evidence of pyelonephritis.  Subsequent follow-up renal ultrasound was normal.  Patient reports persistent urinary urgency which has improved since initial presentation but states that she is going very little.      Denies history of hematuria  Has history of nonhereditary hematochromatosis for which she sees hematology.  History of bladder lift in 2000    Smokes 1/2 to 1 pack/day x 15 years  Father with history of PCKD    Urine culture   6/24/2020: No growth  6/5/2020: no growth       Past Medical History  Abdominal pain; Abscess; Allergic rhinitis, chronic; Anxiety; Arthritis; Bladder Disorder; Bladder wall thickening; Bowel Disease; Constipation; Degenerative Disc Disease ; GERD; Hemorrhoids; Leg pain; Limb Swelling; Mood disorder; Primary osteoarthritis of knees, bilateral; Primary osteoarthritis of right knee; Rectal bleeding; Seasonal allergies; Ulcer; Urinary hesitancy; Urinary urgency         Past Surgical History  Bladder Surgery;  "Breast Surgery; Cholecystecomy; Colonoscopy; endoscopy; Gallbladder; Hysterectomy         Medication List  baclofen 20 mg oral tablet; buspirone 30 mg oral tablet; Creon 36,000-114,000- 180,000 unit oral capsule,delayed release(DR/EC); Miralax 17 gram/dose oral powder; omeprazole 40 mg oral capsule,delayed release(DR/EC); trazodone 50 mg oral tablet; venlafaxine 150 mg oral tablet extended release 24hr; Ventolin HFA 90 mcg/actuation inhalation HFA aerosol inhaler; Vitamin D2 1,250 mcg (50,000 unit) oral capsule; Voltaren 1 % topical gel; Zofran 4 mg oral tablet         Allergy List  PENICILLINS; SULFA (SULFONAMIDES)       Allergies Reconciled  Family Medical History  Pancreatic Neoplasm, Malignant; Heart Disease; Diabetes, unspecified type; Mesothelioma; Family history of certain chronic disabling diseases; arthritis; Family history of Arthritis; Family history of cancer; Family history of heart disease; Family history of diabetes mellitus         Social History  Alcohol (Never); Caffeine (Current every day); Homemaker; lives with spouse; ; Recreational Drug Use (Never); Second hand smoke exposure (Current some day); Tobacco (Never); Unemployed.         Review of Systems  · Constitutional  o Denies  o : chills, fever  · Cardiovascular  o Denies  o : chest pain on exertion  · Respiratory  o Denies  o : shortness of breath  · Gastrointestinal  o Admits  o : nausea  o Denies  o : vomiting  · Genitourinary  o Admits  o : trouble voiding  o Denies  o : blood in urine  · Neurologic  o Denies  o : tingling or numbness  · Musculoskeletal  o Denies  o : joint pain  · Endocrine  o Denies  o : weight gain, weight loss      Vitals  Date Time BP Position Site L\R Cuff Size HR RR TEMP (F) WT  HT  BMI kg/m2 BSA m2 O2 Sat        06/30/2020 10:00 AM       12  170lbs 4oz 5'  6\" 27.48 1.9           Physical Examination  · Constitutional  o Appearance  o : Well developed, well nourished, alert, in no acute distress.  · Head " and Face  o Head  o :   § Inspection  § : Normocephalic, atraumatic  o Face  o :   § Inspection  § : No facial lesions  · Respiratory  o Respiratory Effort  o : Breathing is unlabored without accessory muscle use  o Inspection of Chest  o : Normal appearance, no retractions  · Cardiovascular  o Heart  o : normal rate   · Gastrointestinal  o Abdominal Examination  o : Abdomen appears soft, nondistended  · Skin and Subcutaneous Tissue  o General Inspection  o : No rashes, lesions or areas of discoloration present. Skin turgor is normal.  · Neurologic  o Mental Status Examination  o :   § Orientation  § : alert and oriented x 3  o Gait and Station  o :   § Gait Screening  § : Ambulating wiithout difficulty  · Psychiatric  o Mood and Affect  o : mood normal, affect appropriate          Results  · In-Office Procedures  o Surgical procedure  § IOP - Bladder Scan/Residual Urine (67782)   § Specimen vol Ur: 76mL   o Lab procedure  § Automated Dipstick Urinalysis (Surg Spec) WITHOUT Micro HMH (67895)   § Color Ur: Dark yellow   § Clarity Ur: Clear   § Glucose Ur Ql Strip: Negative   § Bilirub Ur Ql Strip: Moderate   § Ketones Ur Ql Strip: Trace   § Sp Gr Ur Qn: greater than 1.030   § Hgb Ur Ql Strip: Negative   § pH Ur-LsCnc: 6.0   § Prot Ur Ql Strip: >=300 mg/dL   § Urobilinogen Ur Strip-mCnc: 2.0 E.U./dL   § Nitrite Ur Ql Strip: Negative   § WBC Est Ur Ql Strip: Negative      [facility]      PACS RADIOLOGY REPORT    Patient: GAL FARAH Acct: #[id number] Report: #[id number]    UNIT #: [id number]  DOS: 20 1108  INSURANCE:[employer] ORDER #:[id number]  LOCATION:[facility]   : 1970    PROVIDERS  ADMITTING:   ATTENDING: SHARITA GOMEZ  FAMILY:  NONE,MD ORDERING:  SHARITA GOMEZ   OTHER:  DICTATING:  CHHAYA BOURNE MD    REQ #:[id number]   EXAM:ABDPELWOW - CT ABDOMEN PELVIS wwo CONTRAST  REASON FOR EXAM:  LUQ ABD TENDERNESS  REASON  FOR VISIT:  LUQ ABD TENDERNESS    *******Signed******     PROCEDURE: CT ABDOMEN PELVIS WITHOUT AND WITH CONTRAST     COMPARISON: [facility], CT, ABDOMEN/PELVIS WITH CONTRAST, 1/17/2019, 13:27.     INDICATIONS: LUQ ABDOMEN TENDERNESS     TECHNIQUE: After obtaining the patient's consent, CT images of the abdomen were created without and   with non-ionic intravenous contrast material, and CT images of the pelvis were obtained with   non-ionic intravenous contrast material.       PROTOCOL:   Standard imaging protocol performed      RADIATION:   DLP: 2417mGy*cm    Automated exposure control was utilized to minimize radiation dose.   CONTRAST: 95cc Isovue 370 I.V.     FINDINGS:   The lung bases are clear.  Heart size normal.  No pericardial effusion or pleural effusion.     Initial noncontrast imaging of the abdomen and pelvis is obtained no obstructive uropathy.    Cholecystectomy clips noted.  Subsequently contrast enhanced imaging was performed in portal venous   and delayed phases.  The liver and spleen are normal in size and contour.  Normal adrenal glands.    Pancreas without findings of pancreatitis.  Prominence of the common bile duct likely related to   post cholecystectomy state.  The kidneys demonstrate symmetric enhancement.  There is bilateral   urothelial enhancement of the left and right ureters with asymmetric stranding surrounding the   right ureter.  No obstructive uropathy.  There is circumferential bladder wall thickening which may   relate to cystitis.  The uterus is absent.  There is a hyperdense left lower pole 6 mm renal cyst   most consistent with a hemorrhagic cyst, small size limits complete assessment     Negative for pneumoperitoneum.  No bowel obstruction.  Colonic diverticulosis without   diverticulitis.  The appendix is normal.  The portal vein, splenic vein and superior mesenteric   vein are patent.  Abdominal aorta and branch vessels demonstrate mild atherosclerotic  calcification   without aneurysm.  There are small cysts at the right ovary measuring 1.5 cm and at the left ovary   measuring 1.6 cm likely dominant follicles or follicular cysts.  Abdominal aortic branch   vasculature is patent.  Degenerative disc disease noted at L4-5 and L5-S1 with disc narrowing and   broad-based disc bulges.  Negative for acute fracture.     CONCLUSION:   1. Circumferential bladder wall thickening concerning for cystitis with urothelial thickening and   enhancement of the ureters which may relate to ascending infection, correlate with urinalysis to   evaluate for cystitis/ureteritis.  No CT findings of pyelonephritis.  2. Status post hysterectomy and cholecystectomy.  3. Colonic diverticulosis without diverticulitis.  4. Additional chronic findings above.            CHHAYA BOURNE MD         Electronically Signed and Approved By: CHHYAA BOURNE MD on 2020 at 12:04            Until signed, this is an unconfirmed preliminary report that may contain  errors and is subject to change.                CHHAYAM:  D:20 1204        [facility]      PACS RADIOLOGY REPORT    Patient: GAL FARAH Acct: #[id number] Report: #[id number]    UNIT #: [id number]  DOS: 20 1431  INSURANCE:[employer] ORDER #:[id number]  LOCATION:[facility]   : 1970    PROVIDERS  ADMITTING:   ATTENDING: RODNEY OCHOA  FAMILY:  NONE,MD ORDERING:  RODNEY OCHOA   OTHER:  DICTATING:  HARPER MARIE MD    REQ #:[id number]   EXAM:CRETR - COMPLETE RETROPERI ALBERT KIDNEYS  REASON FOR EXAM:  ABN CT RENAL CYST  REASON FOR VISIT:  ABN CT RENAL CYST    *******Signed******     PROCEDURE: U/S BILATERAL  KIDNEYS     COMPARISON: [facility], US, US ABDOMEN LIMITED, 2016, 11:57.     INDICATIONS: ABN CT RENAL CYST     TECHNIQUE: Ultrasound examination of the kidneys and bladder was performed.       FINDINGS:   Right kidney measures 10 cm in length.  Left  kidney measures 11.9 cm.  No hydronephrosis or visible   renal lesion.  Urinary bladder is decompressed.     CONCLUSION: Negative renal ultrasound            AHRPER MARIE MD         Electronically Signed and Approved By: HARPER MARIE MD on 6/24/2020 at 15:46            Until signed, this is an unconfirmed preliminary report that may contain  errors and is subject to change.                DOWER:  D:06/24/20 1546         Assessment  · Urinary urgency     788.63/R39.15  · Urinary hesitancy     788.64/R39.11  · Bladder wall thickening     596.89/N32.89    Problems Reconciled  Plan  · Medications  o Medications have been Reconciled  o Transition of Care or Provider Policy  · Instructions  o Electronically Identified Patient Education Materials Provided Electronically     Records and imaging reviewed, discussed with patient at length.    Provided reassurance to patient as currently voiding with adequate postvoid residual  Encouraged timed and double voiding  Urine dip negative for infection, appears concentrated; discussed that this may exacerbate her urinary symptoms; encouraged increased intake     Urine culture negative x2; unknown etiology for CT findings, however appear to have resolved on follow-up renal ultrasound.  Findings most consistent with infection or inflammatory response.  Given history of tobacco use, history of bladder lift, and CT findings, recommend further evaluation via cystoscopy  Patient is agreeable with this    Schedule cystoscopy, repeat PVR  All questions addressed    Total time for encounter greater than 30 minutes due to review of records, counseling and coordination of care which dominated greater than 51% of the encounter.               Electronically Signed by: Sydney Simmons MD -Author on July 1, 2020 06:11:56 AM

## 2021-05-14 VITALS
TEMPERATURE: 98.4 F | WEIGHT: 158.25 LBS | HEIGHT: 66 IN | SYSTOLIC BLOOD PRESSURE: 132 MMHG | DIASTOLIC BLOOD PRESSURE: 65 MMHG | BODY MASS INDEX: 25.43 KG/M2

## 2021-05-14 VITALS
HEART RATE: 88 BPM | DIASTOLIC BLOOD PRESSURE: 64 MMHG | BODY MASS INDEX: 24.95 KG/M2 | TEMPERATURE: 96.7 F | WEIGHT: 155.25 LBS | HEIGHT: 66 IN | SYSTOLIC BLOOD PRESSURE: 134 MMHG

## 2021-05-14 NOTE — PROGRESS NOTES
Progress Note      Patient Name: Holly Morin   Patient ID: [id number]   Sex: Female   YOB: 1970    Primary Care Provider: Julissa MICHAELS   Referring Provider: Penelope RON    Visit Date: December 16, 2020    Provider: Sydney Simmons MD   Location: [facility]   Location Address: [address] [place] [address]  [id number]   Location Phone: [phone]          Chief Complaint  · Pt here today for urological concerns     Telephone Visit- presents for evaluation via telephone.   Verbal consent obtained before beginning visit.   The following staff were present during this visit: Elisha Heath LPN  Total time for encounter: 11 minutes       History Of Present Illness     49-year-old lady presents for further evaluation of bladder wall thickening, CT scan findings.  Patient was evaluated by PCP, 6/23/2020, for possible UTI.  Patient complained of difficulty urinating, frequency, urgency, and pelvic pressure.  Patient notes the symptoms to be minimal at baseline.  UA nitrite positive however urine culture has been negative x2.  CT scan obtained by primary indicates diffuse bladder wall thickening as well as bilateral ureteral enhancement; no evidence of pyelonephritis.  Subsequent follow-up renal ultrasound was normal.  Patient reports persistent urinary urgency which has improved since initial presentation but states that she is going very little.      Denies history of hematuria  Has history of nonhereditary hematochromatosis for which she sees hematology.  History of bladder lift in 2000    Smokes 1/2 to 1 pack/day x 15 years  Father with history of PCKD    Update 12/16/2020: Patient presents for follow-up after CT scan which indicated left renal lesion.  Patient states that she was notified that it had slightly increased in size and was recommended to see urology for further discussion.  Denies significant flank pain.  Has undergone's recent work-up by  GI.  Notes baseline urinary symptoms, hesitancy, difficulty urinating significantly improved since starting Flomax.  Request refills.    _____________  CT abdomen pelvis with and without contrast, 11/2020: stable 8 mm left posterior lower pole renal low-density lesion which is indeterminate.    Urine culture   6/24/2020: No growth  6/5/2020: no growth       Past Medical History  Abdominal pain; Abscess; Allergic rhinitis, chronic; Anxiety; Arthritis; Bladder Disorder; Bladder wall thickening; Bowel Disease; Constipation; Degenerative Disc Disease ; GERD; Hemorrhoids; Leg pain; Limb Swelling; Mood disorder; Primary osteoarthritis of knees, bilateral; Primary osteoarthritis of right knee; Rectal bleeding; Seasonal allergies; Ulcer; Urinary hesitancy; Urinary urgency         Past Surgical History  Bladder Surgery; Breast Surgery; Cholecystecomy; Colonoscopy; endoscopy; Gallbladder; Hysterectomy         Medication List  Creon 36,000-114,000- 180,000 unit oral capsule,delayed release(DR/EC); Miralax 17 gram/dose oral powder; omeprazole 40 mg oral capsule,delayed release(DR/EC); tamsulosin 0.4 mg oral capsule; venlafaxine 150 mg oral tablet extended release 24hr; Ventolin HFA 90 mcg/actuation inhalation HFA aerosol inhaler; Vitamin D2 1,250 mcg (50,000 unit) oral capsule; Zofran 4 mg oral tablet         Allergy List  PENICILLINS; SULFA (SULFONAMIDES)       Allergies Reconciled  Family Medical History  Pancreatic Neoplasm, Malignant; Mesothelioma; Family history of Arthritis; Family history of cancer; Family history of heart disease; Family history of diabetes mellitus         Social History  Alcohol (Never); Caffeine (Current every day); Homemaker; lives with spouse; ; Recreational Drug Use (Never); Second hand smoke exposure (Current some day); Tobacco (Never); Unemployed.         Review of Systems  · Constitutional  o Denies  o : fever, chills  · Genitourinary  o Denies  o : painful urination, blood in  urine          Results       [facility]      PACS RADIOLOGY REPORT    Patient: GAL FARAH Acct: #[id number] Report: #[id number]    UNIT #: [id number]  DOS: 20 1017  INSURANCE:[employer] ORDER #:[id number]  LOCATION:[facility]   : 1970    PROVIDERS  ADMITTING:   ATTENDING: GWYN TORRES  FAMILY:  NONE,MD ORDERING:  GWYN TORRES   OTHER:  DICTATING:  LIVIER THOMAS MD    REQ #:[id number]   EXAM:ABDPELW - CT ABDOMEN  PELVIS w CONTRAST  REASON FOR EXAM:  LUQ ABD PAIN/WT LOSS  REASON FOR VISIT:  LUQ ABD PAIN/WT LOSS    *******Signed******     PROCEDURE: CT ABDOMEN PELVIS WITH CONTRAST     COMPARISON: [facility], CT, ABDOMEN PELVIS W/WO CONTRAST, 2020, 11:17.     INDICATIONS: LUQ ABDOMEN PAIN/WEIGHT LOSS     TECHNIQUE: After obtaining the patient's consent, CT images were created with non-ionic intravenous   contrast material.       PROTOCOL:   Standard imaging protocol performed      RADIATION:   DLP: 1474mGy*cm    Automated exposure control was utilized to minimize radiation dose.   CONTRAST: 95cc Isovue 370 I.V.  LABS:   eGFR: 100ml/min/1.73m2     FINDINGS:   There is minimal right basilar atelectasis.     There are clips from cholecystectomy.     There is a stable 8 mm left posterior lower pole renal low-density lesion which is indeterminate.     The liver, bilateral adrenal glands, bilateral kidneys, pancreas and spleen are otherwise normal.     The abdominal and pelvic portions of the gastrointestinal tract are unremarkable.  There is no   convincing acute inflammation the.     There is mild bladder wall thickening.     CONCLUSION:   1. There is mild right basilar atelectasis.  2. There is mild bladder wall thickening, indeterminate.            LIVIER THOMAS MD         Electronically Signed and Approved By: LIVIER THOMAS MD on 2020 at 11:11                     Until signed, this is an  unconfirmed preliminary report that may contain  errors and is subject to change.                SERKE:  D:11/30/20 1111         Assessment  · Urinary urgency     788.63/R39.15  · Urinary hesitancy     788.64/R39.11  · Bladder wall thickening     596.89/N32.89  · Renal cyst     753.10/N28.1    Problems Reconciled  Plan  · Orders  o Physician Telephone Evaluation, 11-20 minutes (46268) - 788.63/R39.15, 788.64/R39.11, 596.89/N32.89, 753.10/N28.1 - 12/16/2020  o CT Abdomen and Pelvis (with and without Contrast) with Bosniak Class and delayed images ([id number]) - 753.10/N28.1 - 12/16/2021  · Medications  o Medications have been Reconciled  o Transition of Care or Provider Policy  · Instructions  o Electronically Identified Patient Education Materials Provided Electronically     Urinary hesitancy, frequencyimproved significantly with Flomax; refills sent    Left renal lesioncyst versus mass; CT imaging reviewed, discussed with patient.  Discussed that this left renal lesion is so small that it is indeterminate at this time thus recommend continued surveillance imaging until adequately characterized.    Plan for repeat CT scan with and without contrast in a year to assess for septations, enhancement, calcifications, and assign Bosniak score.    Discussed with patient that Bosiniak 1 & 2 cysts are benign, have no malignant potential, may change in size over time, but do not warrant routine surveillance or management. They are generally considered sporadic and mostly clinically inconsequential. Aware that higher Bosniak score cysts 2F, 3, and 4 warrant surveillance or other management.    Patient to follow up 1 year with CT scan prior   all questions addressed                Electronically Signed by: Sydney Simmons MD -Author on December 16, 2020 12:45:11 PM

## 2021-05-14 NOTE — PROGRESS NOTES
Progress Note      Patient Name: Holly Morin   Patient ID: [id number]   Sex: Female   YOB: 1970    Primary Care Provider: Julissa KENNY   Referring Provider: Penelope RON    Visit Date: January 26, 2021    Provider: ASAF Lang   [place] [facility]   Location Address: [address] [place] [address], [address]  [place], [place]  [id number]   Location Phone: [phone]          History Of Present Illness     Ms. Morin presents for f/u of abdominal pain, weight loss and family history of pancreatic cancer.      11/30/2020 CT abdomen pelvis with contrast-mild bladder wall thickening which is indeterminant.  GI tract unremarkable.    She reports that she's continuing to experience vomiting.  She doesn't feel that creon was helpful.  Admits pressure in LUQ and in lower back/rectal area.  States that she's vomiting with every meal and can only eat very small portions.      Denies dysphagia.  Heartburn is controlled.    She admits constipation.  States that she often has to manually remove stool.  Previously tried miralax (not effective), linzess (caused diarrhea) and trulance (not effective).            Past Medical History  Abdominal pain; Abscess; Allergic rhinitis, chronic; Anxiety; Arthritis; Bladder Disorder; Bladder wall thickening; Bowel Disease; Constipation; Degenerative Disc Disease ; GERD; Hemorrhoids; Leg pain; Limb Swelling; Mood disorder; Primary osteoarthritis of knees, bilateral; Primary osteoarthritis of right knee; Rectal bleeding; Seasonal allergies; Ulcer; Urinary hesitancy; Urinary urgency         Past Surgical History  Bladder Surgery; Breast Surgery; Cholecystecomy; Colonoscopy; endoscopy; Gallbladder; Hysterectomy         Medication List  omeprazole 40 mg oral capsule,delayed release(DR/EC); tamsulosin 0.4 mg oral capsule; venlafaxine 150 mg oral tablet extended release 24hr; Ventolin HFA 90 mcg/actuation inhalation HFA  "aerosol inhaler; Vitamin D2 1,250 mcg (50,000 unit) oral capsule; Zofran 4 mg oral tablet         Allergy List  PENICILLINS; SULFA (SULFONAMIDES)       Allergies Reconciled  Family Medical History  Pancreatic Neoplasm, Malignant; Mesothelioma; Family history of Arthritis; Family history of cancer; Family history of heart disease; Family history of diabetes mellitus         Social History  Alcohol (Never); Caffeine (Current every day); Homemaker; lives with spouse; ; Recreational Drug Use (Never); Second hand smoke exposure (Current some day); Tobacco (Never); Unemployed.         Review of Systems  · Constitutional  o Denies  o : chills, fever  · Cardiovascular  o Denies  o : chest pain, irregular heart beats  · Respiratory  o Denies  o : cough, shortness of breath  · Gastrointestinal  o Admits  o : see HPI   · Endocrine  o Denies  o : weight gain, weight loss      Vitals  Date Time BP Position Site L\R Cuff Size HR RR TEMP (F) WT  HT  BMI kg/m2 BSA m2 O2 Sat FR L/min FiO2 HC       01/26/2021 10:28 /64 Sitting    88 - R  96.7 155lbs 4oz 5'  6\" 25.06 1.81             Physical Examination  · Constitutional  o Appearance  o : Healthy-appearing, awake and alert in no acute distress  · Head and Face  o Head  o : Normocephalic with no worriesome skin lesions  · Eyes  o Vision  o :   § Visual Fields  § : eyes move symmetrical in all directions  o Sclerae  o : sclerae anicteric  o Pupils and Irises  o : pupils equal and symmetrical  · Neck  o Inspection/Palpation  o : Trachea is midline, no adenopathy  · Respiratory  o Respiratory Effort  o : Breathing is unlabored.  o Inspection of Chest  o : normal appearance  o Auscultation of Lungs  o : Chest is clear to auscultation bilaterally.  · Cardiovascular  o Heart  o :   § Auscultation of Heart  § : no murmurs, rubs, or gallops  o Peripheral Vascular System  o :   § Extremities  § : no cyanosis, clubbing or edema;   · Gastrointestinal  o Abdominal Examination  o : " mild left-upper quadrant tenderness to palpation present, normal bowel sounds, tone normal without rigidity or guarding, no masses present, abdomen scaphoid upon supine, no ascites  o Digital Rectal Exam  o : deferred  · Skin and Subcutaneous Tissue  o General Inspection  o : without focal lesions; turgor is normal  · Psychiatric  o General  o : Alert and oriented x3  o Mood and Affect  o : Mood and affect are appropriate to circumstances  · Extremities  o Extremities  o : No edema, no cyanosis          Assessment  · Abdominal Pain, LUQ     789.02/R10.12  · Constipation     564.00/K59.00  · Nausea     787.02/R11.0  · Vomiting     787.03/R11.10      Plan  · Orders  o Gastric Emptying Study [facility] ([id number]) - - 01/26/2021  · Medications  o lactulose 10 gram/15 mL (15 mL) oral solution   SIG: take 30 milliliters (20 gram) by oral route once daily for 30 days   DISP: (900) Milliliter with 1 refills  Prescribed on 01/26/2021     o omeprazole 40 mg oral capsule,delayed release(DR/EC)   SIG: take 1 capsule (40 mg) by oral route once daily before a meal   DISP: (90) Capsule with 1 refills  Adjusted on 01/26/2021     o Zofran 4 mg oral tablet   SIG: take 1 tablet by oral route every 6 hours as needed nausea   DISP: (20) Tablet with 1 refills  Refilled on 01/26/2021     o Creon 36,000-114,000- 180,000 unit oral capsule,delayed release(DR/EC)   SIG: take 2 capsules by oral route 3 times a day with first bite of food and snacks   DISP: (120) Capsule with 3 refills  Discontinued on 01/26/2021     o Miralax 17 gram/dose oral powder   SIG: take 17 gram mixed with 8 oz. water, juice, soda, coffee or tea by oral route once daily   DISP: (1) 765 gm bottle with 1 refills  Discontinued on 01/26/2021     o Medications have been Reconciled  o Transition of Care or Provider Policy  · Instructions  o Information given on current diagnoses. If lactulose not effective, plan for motegrity.  · Disposition  o Follow up 3  months            Electronically Signed by: ASAF Lang -Author on January 26, 2021 09:51:37 PM

## 2021-05-15 VITALS
BODY MASS INDEX: 32.06 KG/M2 | DIASTOLIC BLOOD PRESSURE: 94 MMHG | SYSTOLIC BLOOD PRESSURE: 153 MMHG | WEIGHT: 199.5 LBS | HEIGHT: 66 IN

## 2021-05-15 VITALS — HEART RATE: 77 BPM | BODY MASS INDEX: 31.66 KG/M2 | WEIGHT: 197 LBS | HEIGHT: 66 IN

## 2021-05-15 VITALS — HEART RATE: 90 BPM | HEIGHT: 66 IN | OXYGEN SATURATION: 99 % | WEIGHT: 190 LBS | BODY MASS INDEX: 30.53 KG/M2

## 2021-05-15 VITALS — BODY MASS INDEX: 31.18 KG/M2 | HEART RATE: 78 BPM | HEIGHT: 66 IN | WEIGHT: 194 LBS

## 2021-05-15 VITALS — WEIGHT: 170.25 LBS | HEIGHT: 66 IN | RESPIRATION RATE: 12 BRPM | BODY MASS INDEX: 27.36 KG/M2

## 2021-05-16 VITALS — WEIGHT: 176 LBS | RESPIRATION RATE: 16 BRPM | HEIGHT: 66 IN | BODY MASS INDEX: 28.28 KG/M2

## 2021-05-16 VITALS
SYSTOLIC BLOOD PRESSURE: 146 MMHG | DIASTOLIC BLOOD PRESSURE: 79 MMHG | WEIGHT: 190.12 LBS | HEIGHT: 66 IN | BODY MASS INDEX: 30.56 KG/M2

## 2021-05-16 VITALS — HEART RATE: 99 BPM | BODY MASS INDEX: 28.61 KG/M2 | WEIGHT: 178 LBS | HEIGHT: 66 IN

## 2021-05-16 VITALS — HEIGHT: 66 IN | BODY MASS INDEX: 32.62 KG/M2 | OXYGEN SATURATION: 98 % | HEART RATE: 102 BPM | WEIGHT: 203 LBS

## 2021-05-16 VITALS — BODY MASS INDEX: 28.12 KG/M2 | WEIGHT: 175 LBS | RESPIRATION RATE: 14 BRPM | HEIGHT: 66 IN

## 2021-05-16 VITALS
SYSTOLIC BLOOD PRESSURE: 155 MMHG | HEIGHT: 66 IN | WEIGHT: 197.37 LBS | BODY MASS INDEX: 31.72 KG/M2 | DIASTOLIC BLOOD PRESSURE: 82 MMHG

## 2021-05-28 VITALS
WEIGHT: 178.79 LBS | DIASTOLIC BLOOD PRESSURE: 74 MMHG | BODY MASS INDEX: 29.79 KG/M2 | BODY MASS INDEX: 33.76 KG/M2 | HEIGHT: 65 IN | SYSTOLIC BLOOD PRESSURE: 146 MMHG | DIASTOLIC BLOOD PRESSURE: 72 MMHG | SYSTOLIC BLOOD PRESSURE: 138 MMHG | HEART RATE: 89 BPM | RESPIRATION RATE: 20 BRPM | TEMPERATURE: 98.5 F | OXYGEN SATURATION: 98 % | OXYGEN SATURATION: 100 % | HEIGHT: 65 IN | OXYGEN SATURATION: 99 % | WEIGHT: 172.4 LBS | BODY MASS INDEX: 28.72 KG/M2 | TEMPERATURE: 97.5 F | TEMPERATURE: 99.2 F | SYSTOLIC BLOOD PRESSURE: 149 MMHG | RESPIRATION RATE: 18 BRPM | HEIGHT: 65 IN | DIASTOLIC BLOOD PRESSURE: 64 MMHG | HEART RATE: 83 BPM | HEART RATE: 107 BPM | WEIGHT: 202.6 LBS

## 2021-05-28 NOTE — PROGRESS NOTES
Patient: GAL FARAH     Acct: [id number]     Report: #[id number]  UNIT #: [id number]     : 1970    Encounter Date:2020  PRIMARY CARE: RODNEY OCHOA  ***Signed***  --------------------------------------------------------------------------------------------------------------------  NURSE INTAKE      Visit Type      Established Patient Visit            Chief Complaint      ELEVATED FERRITIN            Referring Provider/Copies To      Referring Provider:  Yaakov Lu      Primary Care Provider:  RODNEY OCHOA      Copies To:   RODNEY OCHOA            History and Present Illness      Past History      This is a 45-year-old female worked up for elevated ferritin and the test came     back as positive for hemochromatosis.  Since her last visit patient has had     drenching night sweats and weight loss.      -August 3, 2016. Iron is 58, TIBC is 240, iron saturation is 24, ferritin is     296. BMP and TSH were within normal limits. Lipase was normal. CBC was within     normal limits.      -.  Iron is 61.  TIBC is 310.  Iron saturation is 20.  Ferritin     is 265.      -2017.  Patient had a CT chest, abdomen, and pelvis the results     showed mild bilateral non-specific hilar adenopathy up to 1.2 cm. Bilateral     lower lobes show increased secretion with mild adjacent pneumonitis, possibly     bronchitis.       -.  WBC 7.6.  Hemoglobin 14.2.  Platelet count 239,000.  Iron     70.  TIBC 280.  Percent saturation 25.  Transferrin 196,000.  Ferritin 248.      Based level was normal at 27.      -.  CT abdomen and pelvis showed status post hysterectomy. 1.4 cm     water density lesion on the right ovary probably       represents a functional cyst. Also a fatty liver.      -May 5-2017.  WBC 9.16.  Hemoglobin 13.5.  Platelet count 333,000.  Ferritin     263.      -.  Cervical spine MRI showed multilevel degenerative changes     greatest at  C5-C6.  Moderate central canal stenosis and mild to moderate     foraminal stenosis.      -February 8, 2018. WBC 7.60, Hemoglobin 12.90, Platelet count 296,000. Vitamin     B-12 167, folate 9, Iron 112, TIBC 269, Iron sat 42%, Ferritin 159.       June 8, 2018. Presents with  for follow up for B-12 deficiency,     hyperferritinemia. Reports she feels better with the B-12 injections. Missed     dose last month and fatigue worsened. Reports large amount of rectal bleeding     when she wipes. She thinks it may be a hemmorrhoid. Has not had a phlebotomy     since last September 2017. Goal is to keep Ferritin < 200. Last ferritin was 195    in April, 2017. CT pelvis was reviewed with patient showed left buttock abscess     for which she received antibiotics.       June 26,2018. Presents for follow up for hyperferritinemia and B-12 deficiency.     Reports left upper abdominal discomfort,intermittently. Worsening fatigue since     B-12 injections stopped.  Labs from june 8 with B-12 of 416. Iron studies     ferritin 195, iron 48, TIBC 292, iron sat 16%. Has intermittent rectal bleeding.    Has GI consult scheduled for August 13. Denies any worsening fatigue.       August 8-2018.  Abdominal ultrasound is normal      August .  WBC 8.14.  Hemoglobin 13.4.  Platelet count 254,000.      Creatinine 0.98            HPI - Hematology Interim      1) Elevated Ferritin: History of elevated ferritin level with levels in the 200     range. Labwork was negative for hemochromatosis gene mutations in September 2016.             Patient reports she had been receiving phlebotomies through her PCP for any     ferritin levels greater than 200. She has had had any phlebotomies recently.             2) Abdominal Pain: At her last visit, she was was left upper quadrant pain  with    tenderness with intermittent N/V which had been going on for the last one year.     She requests med for nausea today.             3) Bladder wall  thickening: A CT of abdomen and pelvis was completed on 20     and showed circumferential bladder wall thickening concerning for cystitis with     urothelial thickening and enhancement of the ureters which may relate to     ascending infection with recommendation to perform UA. No  CT findings     concerning for pyelonephritis. Patient denies any hematuria at this time. She     does present today with complaints of recent dysuria.            Most Recent Imaging Findings      [facility]                PACS RADIOLOGY REPORT            Patient: GAL FARAH   Acct: #[id number]   Report: #[id number]            UNIT #: [id number]    DOS: 20 1108      INSURANCE:[employer]   ORDER #:[id number]      LOCATION:[facility]     : 1970            PROVIDERS      ADMITTING:     ATTENDING: SHARITA GOMEZ      FAMILY:  NONE,MD   ORDERING:  SHARITA GOMEZ         OTHER:    DICTATING:  CHHAYA BOURNE MD            REQ #:[id number]   EXAM:ABDPELWOW - CT ABDOMEN PELVIS wwo CONTRAST      REASON FOR EXAM:  LUQ ABD TENDERNESS      REASON FOR VISIT:  LUQ ABD TENDERNESS            *******Signed******         PROCEDURE:   CT ABDOMEN PELVIS WITHOUT AND WITH CONTRAST             COMPARISON:   [facility], CT, ABDOMEN/PELVIS WITH CONTRAST,     2019, 13:27.             INDICATIONS:   LUQ ABDOMEN TENDERNESS             TECHNIQUE:   After obtaining the patient's consent, CT images of the abdomen     were created without and       with non-ionic intravenous contrast material, and CT images of the pelvis were     obtained with       non-ionic intravenous contrast material.               PROTOCOL:     Standard imaging protocol performed                RADIATION:     DLP: 2417mGy*cm          Automated exposure control was utilized to minimize radiation dose.       CONTRAST:   95cc Isovue 370 I.V.             FINDINGS:          The lung bases are clear.  Heart size normal.  No pericardial effusion or     pleural effusion.             Initial noncontrast imaging of the abdomen and pelvis is obtained no obstructive    uropathy.        Cholecystectomy clips noted.  Subsequently contrast enhanced imaging was     performed in portal venous       and delayed phases.  The liver and spleen are normal in size and contour.      Normal adrenal glands.        Pancreas without findings of pancreatitis.  Prominence of the common bile duct     likely related to       post cholecystectomy state.  The kidneys demonstrate symmetric enhancement.      There is bilateral       urothelial enhancement of the left and right ureters with asymmetric stranding     surrounding the       right ureter.  No obstructive uropathy.  There is circumferential bladder wall     thickening which may       relate to cystitis.  The uterus is absent.  There is a hyperdense left lower     pole 6 mm renal cyst       most consistent with a hemorrhagic cyst, small size limits complete assessment             Negative for pneumoperitoneum.  No bowel obstruction.  Colonic diverticulosis     without       diverticulitis.  The appendix is normal.  The portal vein, splenic vein and     superior mesenteric       vein are patent.  Abdominal aorta and branch vessels demonstrate mild     atherosclerotic calcification       without aneurysm.  There are small cysts at the right ovary measuring 1.5 cm and    at the left ovary       measuring 1.6 cm likely dominant follicles or follicular cysts.  Abdominal     aortic branch       vasculature is patent.  Degenerative disc disease noted at L4-5 and L5-S1 with     disc narrowing and       broad-based disc bulges.  Negative for acute fracture.             CONCLUSION:         1. Circumferential bladder wall thickening concerning for cystitis with     urothelial thickening and       enhancement of the ureters which may relate to ascending infection,  correlate     with urinalysis to       evaluate for cystitis/ureteritis.  No CT findings of pyelonephritis.      2. Status post hysterectomy and cholecystectomy.      3. Colonic diverticulosis without diverticulitis.      4. Additional chronic findings above.              CHHAYA BOURNE MD             Electronically Signed and Approved By: CHHAYA BOURNE MD on 6/02/2020 at 12:04                        PAST, FAMILY   Past Medical History      Past Medical History:  Arthritis      Other Hematology History:        ELEVATED FERRITIN            Past Surgical History      Biopsy (cysts removed from rt breast), Cholecystectomy, Fracture Repair (rt     knee)            Family History      Family History:  Lymphoma (MOTHER)            Social History      Marital Status:        Lives independently:  Yes      Number of Children:  1      Occupation:  housewife            Tobacco Use      Tobacco status:  Current every day smoker, Former smoker      Smoking packs/day:  1      Quit status:  Quit date established            Alcohol Use      Alcohol intake:  None            Substance Use      Substance use:  Denies use            REVIEW OF SYSTEMS      General:  Admits: Fatigue;          Denies: Appetite Change, Fever, Night Sweats, Weight Gain, Weight Loss      Eye:  Denies Blurred Vision, Denies Corrective Lenses, Denies Diplopia, Denies     Vision Changes      ENT:  Denies Headache, Denies Hearing Loss, Denies Hoarseness, Denies Sore     Throat      Cardiovascular:  Denies Chest Pain, Denies Palpitations      Respiratory:  Denies: Cough, Coughing Blood, Productive Cough, Shortness of Air,    Wheezing      Gastrointestinal:  Admits Nausea/Vomiting; Denies Bloody Stools, Denies     Constipation, Denies Diarrhea, Denies Problem Swallowing, Denies Unable to     Control Bowels      Genitourinary:  Denies Blood in Urine, Denies Incontinence; Admits Painful     Urination      Musculoskeletal:  Denies Back Pain, Denies Muscle Pain,  Denies Painful Joints      Integumentary:  Denies Itching, Denies Lesions, Denies Rash      Neurologic:  Denies Dizziness, Denies Numbness\Tingling, Denies Seizures      Psychiatric:  Denies Anxiety, Denies Depression      Endocrine:  Denies Cold Intolerance, Denies Heat Intolerance      Hematologic/Lymphatic:  Denies Bruising, Denies Bleeding, Denies Enlarged Lymph     Nodes      Reproductive:  Denies: Menopause, Heavy Periods, Pregnant, Still Menstruating            VITAL SIGNS AND SCORES      Vitals      Height 5 ft 5.25 in / 165.74 cm      Weight 178 lbs 12.689 oz / 81.1 kg      BSA 1.89 m2      BMI 29.5 kg/m2      Temperature 98.5 F / 36.94 C - Temporal      Pulse 89      Blood Pressure 149/72 Sitting, Left Arm      Pulse Oximetry 99%, ROOM AIR            Pain Score      Experiencing any pain?:  No      Pain Scale Used:  Numerical      Pain Intensity:  0            Fatigue Score      Experiencing any fatigue?:  Yes      Fatigue (0-10 scale):  5            EXAM      General Appearance:  Positive for: Alert, Oriented x3, Cooperative      Eye:  Positive for: Moist Conjunctiva, PERRLA, Reactive to Light      HEENT:  Positive for: Oropharynx clear;          Negative for: Erythema      Neck:  Positive for: Full ROM, Supple      Respiratory:  Positive for: CTAB, Normal Respiratory Effort      Abdomen/Gastro:  Positive for: Normal Active Bowel Sounds, Soft;          Negative for: Distention, Tenderness      Cardiovascular:  Positive for: RRR;          Negative for: Murmur, Peripheral Edema      Skin:  Positive for: Normal Temperature, Normal Texture and Turgor, Normal Tone;             Negative for: Rash      Psychiatric:  Positive for: AAO X 3, Appropriate Affect, Intact Judgement      Neurologic:  Positive for: Cranial Ner II-XII Intact, Deep Tendon Reflexes;          Negative for: Dizziness, Headache      Genitourinary:  Positive for: Bladder Distention      Musculoskeletal:  Positive for: Full ROM Lower Extremety,  Full ROM Upper     Extremety, Full Muscle Strength, Full Muscle Tone      Lower Extremities:  Positive for: Normal Gait and Station, Pedal Pulses Intact,     Pedal Pulses Symetrical      Upper Extremities:  Negative for: Edema, Weakness      Lymphatic:  Negative for: Axillary, Cervical, Supraclavicular            PREVENTION      Hx Influenza Vaccination:  Yes      Date Influenza Vaccine Given:  Oct 1, 2019      Influenza Vaccine Declined:  No      2 or More Falls Past Year?:  No      Fall Past Year with Injury?:  No      Hx Pneumococcal Vaccination:  No      Encouraged to follow-up with:  PCP regarding preventative exams.      Chart initiated by      VINCE MORA [facility]            ALLERGY/MEDS      Allergies      Coded Allergies:             PENICILLINS (Verified  Allergy, Unknown, RASH, 6/5/20)           SULFA (SULFONAMIDE ANTIBIOTICS) (Verified  Adverse Reaction, Unknown, N/V,     6/5/20)            Medications      Last Reconciled on 6/5/20 13:58 by SHARITA GOMEZ      Ondansetron Odt (ONDANSETRON ODT) 8 Mg Tab.rapdis      8 MG PO TID PRN for NAUSEA, #30 TAB.RAPDIS 0 Refills         Prov: SHARITA GOMEZ onc         6/5/20       Cyanocobalamin (Vitamin B-12) Inj (Cyanocobalamin Inj) 1,000 Mcg/1 Ml Vial      1000 MCG SUBQ MO, #12 VIAL 0 Refills         Prov: SHARITA GOMEZ onc         4/1/20       Venlafaxine Hcl XR (Venlafaxine XR*) 75 Mg Cap.er.24h      100 MG PO QDAY, CAP         Reported         4/3/19       Baclofen (BACLOFEN) 20 Mg Tablet      20 MG PO TID, #30 TAB         Reported         4/3/19       Ergocalciferol (Ergocalciferol) 50,000 Unit Capsule      38211 UNITS PO MO@09, #4 CAP 0 Refills         Reported         3/20/19       busPIRone HCl (Buspar) 15 Mg Tablet      15 MG PO TID, #90 TAB         Reported         3/20/19       traZODone HCl (Desyrel) 50 Mg Tablet      50 MG PO QDAY, #30 TAB 0 Refills         Reported         3/20/19       traZODone HCl (Desyrel) 50 Mg Tablet      25 MG PO  QDAY, #15 TAB 0 Refills         Reported         3/20/19       Omeprazole (Omeprazole*) 20 Mg Tablet.      20 MG PO QDAY, #30 CAP 1 Refill         Prov: SVETLANA MURRAY         10/10/18       raNITIdine HCL (raNITIdine HCL) 300 Mg Tablet      300 MG PO HS for ACID REFLUX, #30 TAB         Reported         4/8/16      Medications Reviewed:  No Changes made to meds            IMPRESSION/PLAN      Impression      1) Elevated Ferritin: History of elevated ferritin level with levels in the 200     range. Labwork was negative for hemochromatosis gene mutations in September 2016.             Patient reports she had been receiving phlebotomies through her PCP for any     ferritin levels greater than 200. She has had had any phlebotomies recently.             2) Abdominal Pain: At her last visit, she was was left upper quadrant pain  with    tenderness with intermittent N/V which had been going on for the last one year.     She requests med for nausea today.             3) Bladder wall thickening: A CT of abdomen and pelvis was completed on 6/2/20     and showed circumferential bladder wall thickening concerning for cystitis with     urothelial thickening and enhancement of the ureters which may relate to     ascending infection with recommendation to perform UA. No  CT findings     concerning for pyelonephritis. Patient denies any hematuria at this time. She     does present today with complaints of recent dysuria.            Diagnosis      Bladder wall thickening - N32.89            Notes      New Medications      * ONDANSETRON ODT 8 MG TAB.RAPDIS: 8 MG PO TID PRN NAUSEA #30      New Diagnostics      * Urinalyis W/Micro, As Soon As Possible         Dx: Bladder wall thickening - N32.89            Plan      1) Continue lab work with PCP.       Follow up in 1 year with NP for lab work.             2) 3) Urinalysis today to evaluate for cystitis. Will call with UA results or if    urine culture is needed.             If  no improvement, may need referral to urology.             Patient was given copy of CT scan report to show her PCP.             Zofran 8 mg 1 tab TID PRN nausea to her pharmacy.            Pain      Pain Zero Today            Advanced Care Plan Discussion      Declines Discussion [id number]            Patient Education      Patient Education Provided:  Yes            Electronically signed by SHARITA GOMEZ onc  06/05/2020 13:58       Disclaimer: Converted document may not contain table formatting or lab diagrams. Please see [employer] for the authenticated document.

## 2021-05-28 NOTE — PROGRESS NOTES
Patient: GAL FARAH     Acct: [id number]     Report: #[id number]  UNIT #: [id number]     : 1970    Encounter Date:2020  PRIMARY CARE: RODNEY OCHOA  ***Signed***  --------------------------------------------------------------------------------------------------------------------  NURSE INTAKE      Visit Type      Established Patient Visit            Chief Complaint      ELEVATED FERRITIN            Referring Provider/Copies To      Referring Provider:  Yaakov Lu      Primary Care Provider:  RODNEY OCHOA      Copies To:   RODNEY OCHOA            History and Present Illness      Past History      This is a 45-year-old female worked up for elevated ferritin and the test came     back as positive for hemochromatosis.  Since her last visit patient has had     drenching night sweats and weight loss.      -August 3, 2016. Iron is 58, TIBC is 240, iron saturation is 24, ferritin is     296. BMP and TSH were within normal limits. Lipase was normal. CBC was within     normal limits.      -.  Iron is 61.  TIBC is 310.  Iron saturation is 20.  Ferritin     is 265.      -2017.  Patient had a CT chest, abdomen, and pelvis the results     showed mild bilateral non-specific hilar adenopathy up to 1.2 cm. Bilateral     lower lobes show increased secretion with mild adjacent pneumonitis, possibly     bronchitis.       -.  WBC 7.6.  Hemoglobin 14.2.  Platelet count 239,000.  Iron     70.  TIBC 280.  Percent saturation 25.  Transferrin 196,000.  Ferritin 248.      Based level was normal at 27.      -.  CT abdomen and pelvis showed status post hysterectomy. 1.4 cm     water density lesion on the right ovary probably       represents a functional cyst. Also a fatty liver.      -May 5-2017.  WBC 9.16.  Hemoglobin 13.5.  Platelet count 333,000.  Ferritin     263.      -.  Cervical spine MRI showed multilevel degenerative changes     greatest at  C5-C6.  Moderate central canal stenosis and mild to moderate     foraminal stenosis.      -February 8, 2018. WBC 7.60, Hemoglobin 12.90, Platelet count 296,000. Vitamin     B-12 167, folate 9, Iron 112, TIBC 269, Iron sat 42%, Ferritin 159.       June 8, 2018. Presents with  for follow up for B-12 deficiency,     hyperferritinemia. Reports she feels better with the B-12 injections. Missed     dose last month and fatigue worsened. Reports large amount of rectal bleeding     when she wipes. She thinks it may be a hemmorrhoid. Has not had a phlebotomy     since last September 2017. Goal is to keep Ferritin < 200. Last ferritin was 195    in April, 2017. CT pelvis was reviewed with patient showed left buttock abscess     for which she received antibiotics.       June 26,2018. Presents for follow up for hyperferritinemia and B-12 deficiency.     Reports left upper abdominal discomfort,intermittently. Worsening fatigue since     B-12 injections stopped.  Labs from june 8 with B-12 of 416. Iron studies     ferritin 195, iron 48, TIBC 292, iron sat 16%. Has intermittent rectal bleeding.    Has GI consult scheduled for August 13. Denies any worsening fatigue.       August 8-2018.  Abdominal ultrasound is normal      August .  WBC 8.14.  Hemoglobin 13.4.  Platelet count 254,000.      Creatinine 0.98            HPI - Hematology Interim      Presents for follow up for elevated ferritin and other chronic issues:             1) Elevated Ferritin: Mrs. Morin presents for follow up for elevated     ferritin. She reports she has been receiving blood work at her PCP office every     3 months to evaluate for need for phlebotomy. She has a standing order for every    3 month phlebotomy if ferritin is greater than 200. She has not required any     phlebotomies in over a year. Hemochromatosis was not detected in 9/15/2016.             She does report she generally feels well. She does report she watches her red     meat  "intake, fresh vegetables and increased fats for fatty liver.             2) Fatty liver: She is noted to have a mild fatty liver by CT scan in the past.             3) Abdominal pain: She does report she has left upper quadrant abdominal pain     almost daily with N/V. She reports this has been on going for the last year. She    does exhibit left abdominal tenderness when palpated. She reports this feels     like an \"achey\" type sensation. She has feelings of fullness and is only able to    eat small amounts of food generally. She does report she has regular GYN follow     up. She follows with GI for fatty liver as well.             4) Tobacco abuse: We discussed smoking cessation today. I discussed reducing     cigarettes would likely lower her ferritin levels to. We will address this again    at her next visit.            PAST, FAMILY   Past Medical History      Past Medical History:  Arthritis            Past Surgical History      Biopsy (cysts removed from rt breast), Cholecystectomy, Fracture Repair (rt     knee)            Family History      Family History:  Lymphoma (MOTHER)            Social History      Marital Status:        Lives independently:  Yes      Number of Children:  1      Occupation:  housewife            Tobacco Use      Tobacco status:  Current every day smoker, Former smoker      Smoking packs/day:  1      Quit status:  Quit date established            Alcohol Use      Alcohol intake:  None            Substance Use      Substance use:  Denies use            REVIEW OF SYSTEMS      General:  Admits: Fatigue;          Denies: Appetite Change, Fever, Night Sweats, Weight Gain, Weight Loss      Eye:  Denies Blurred Vision, Denies Corrective Lenses, Denies Diplopia, Denies     Vision Changes      ENT:  Denies Headache, Denies Hearing Loss, Denies Hoarseness, Denies Sore     Throat      Cardiovascular:  Denies Chest Pain, Denies Palpitations      Respiratory:  Denies: Coughing Blood, " Productive Cough, Shortness of Air,     Wheezing      Gastrointestinal:  Admits Nausea/Vomiting (CHRONIC); Denies Bloody Stools,     Denies Constipation, Denies Diarrhea, Denies Problem Swallowing, Denies Unable     to Control Bowels      Genitourinary:  Denies Blood in Urine, Denies Incontinence, Denies Painful     Urination      Musculoskeletal:  Denies Back Pain, Denies Muscle Pain, Denies Painful Joints      Integumentary:  Denies Itching, Denies Lesions, Denies Rash      Neurologic:  Denies Dizziness, Denies Numbness\Tingling, Denies Seizures      Psychiatric:  Denies Anxiety, Denies Depression      Endocrine:  Denies Cold Intolerance, Denies Heat Intolerance      Hematologic/Lymphatic:  Denies Bruising, Denies Bleeding, Denies Enlarged Lymph     Nodes      Reproductive:  Denies: Menopause, Heavy Periods, Pregnant, Still Menstruating            VITAL SIGNS AND SCORES      Vitals      Height 5 ft 5.25 in / 165.74 cm      Weight 172 lbs 6.396 oz / 78.2 kg      BSA 1.86 m2      BMI 28.5 kg/m2      Temperature 97.5 F / 36.39 C - Temporal      Pulse 83      Respirations 20      Blood Pressure 138/64 Sitting, Left Arm      Pulse Oximetry 100%, ROOM AIR            Pain Score      Experiencing any pain?:  No      Pain Scale Used:  Numerical      Pain Intensity:  0            Fatigue Score      Experiencing any fatigue?:  Yes      Fatigue (0-10 scale):  8            EXAM      General appearance:  in no apparent distress, cooperative, appears stated age.      HEENT: No pallor, no icterus, oral mucosa moist      Neck: Supple, trachea central-not deviated      Lymph nodes: none palpable peripherally      Cardiovascular: S1-S2 heard, no murmurs, no rubs, no gallops.      Breast exam:      Respiratory: Clear to auscultation bilaterally, no adventitious sounds      Abdomen/gastro: Soft, tenderness to the left upper quandrant.        Skin: No lesions, no rashes, no petechiae.      Extremities: No pedal edema, peripheral pulses  felt, no clubbing      Musculoskeletal: Normal tone, no rigidity of muscles; range of motion intact      Spine: No deformities      Psychiatric: Alert and oriented x3, appropriate affect, intact judgment      Neurologic: Cranial nerves II through XII grossly intact, no gross neurological     deficits noted.            PREVENTION      Hx Influenza Vaccination:  Yes      Date Influenza Vaccine Given:  Oct 1, 2019      Influenza Vaccine Declined:  No      2 or More Falls Past Year?:  No      Fall Past Year with Injury?:  No      Hx Pneumococcal Vaccination:  No      Encouraged to follow-up with:  PCP regarding preventative exams.      Chart initiated by      JUDE VIEIRA MA            ALLERGY/MEDS      Allergies      Coded Allergies:             PENICILLINS (Verified  Allergy, Unknown, RASH, 4/1/20)           SULFA (SULFONAMIDE ANTIBIOTICS) (Verified  Adverse Reaction, Unknown, N/V,     4/1/20)            Medications      Last Reconciled on 4/1/20 15:36 by SHARITA GOMEZ      Cyanocobalamin (Vitamin B-12) Inj (Cyanocobalamin Inj) 1,000 Mcg/1 Ml Vial      1000 MCG SUBQ MO, #12 VIAL 0 Refills         Prov: SHARITA GOMEZ onc         4/1/20       Venlafaxine Hcl XR (Venlafaxine XR*) 75 Mg Cap.er.24h      100 MG PO QDAY, CAP         Reported         4/3/19       Baclofen (BACLOFEN) 20 Mg Tablet      20 MG PO TID, #30 TAB         Reported         4/3/19       Ergocalciferol (Ergocalciferol) 50,000 Unit Capsule      30455 UNITS PO MO@09, #4 CAP 0 Refills         Reported         3/20/19       busPIRone HCl (Buspar) 15 Mg Tablet      15 MG PO TID, #90 TAB         Reported         3/20/19       traZODone HCl (Desyrel) 50 Mg Tablet      50 MG PO QDAY, #30 TAB 0 Refills         Reported         3/20/19       traZODone HCl (Desyrel) 50 Mg Tablet      25 MG PO QDAY, #15 TAB 0 Refills         Reported         3/20/19       Omeprazole (Omeprazole*) 20 Mg Tablet.dr      20 MG PO QDAY, #30 CAP 1 Refill         Prov:  "SVETLANA MURRAY         10/10/18       raNITIdine HCL (raNITIdine HCL) 300 Mg Tablet      300 MG PO HS for ACID REFLUX, #30 TAB         Reported         4/8/16      Medications Reviewed:  Changes made to meds            IMPRESSION/PLAN      Impression      1) Elevated Ferritin: Mrs. Morin presents for follow up for elevated     ferritin. She reports she has been receiving blood work at her PCP office every     3 months to evaluate for need for phlebotomy. She has a standing order for every    3 month phlebotomy if ferritin is greater than 200. She has not required any     phlebotomies in over a year. Hemochromatosis was not detected in 9/15/2016.             She does report she generally feels well. She does report she watches her red     meat intake, fresh vegetables and increased fats for fatty liver.             2) Fatty liver: She is noted to have a mild fatty liver by CT scan in the past.             3) Abdominal pain: She does report she has left upper quadrant abdominal pain     almost daily with N/V. She reports this has been on going for the last year. She    does exhibit left abdominal tenderness when palpated. She reports this feels     like an \"achey\" type sensation. She has feelings of fullness and is only able to    eat small amounts of food generally. She does report she has regular GYN follow     up. She follows with GI for fatty liver as well.             4) Tobacco abuse: We discussed smoking cessation today. I discussed reducing     cigarettes would likely lower her ferritin levels to. We will address this again    at her next visit.            Diagnosis      Elevated ferritin - R79.89            Left upper quadrant abdominal tenderness         Left upper quadrant abdominal tenderness without rebound tenderness         Presence of rebound: absent            Notes      New Medications      * Cyanocobalamin (Vitamin B-12) Inj (Cyanocobalamin Inj) 1,000 MCG/1 ML VIAL:       1,000 MCG SUBQ MO #12  "        Instructions: dispense 3 ml syringes and SubQ needles      Changed Medications      * Venlafaxine Hcl XR (Venlafaxine XR*) 75 MG CAP.ER.24H: 100 MG PO QDAY         Instructions: TAKE 1 - 100MG TAB ONCE A DAY      New Diagnostics      * CBC With Auto Diff, Routine         Dx: Elevated ferritin - R79.89      * CMP Comp Metabolic Panel, Routine         Dx: Elevated ferritin - R79.89      * LDH, Routine         Dx: Elevated ferritin - R79.89      * Iron Profile, Routine         Dx: Elevated ferritin - R79.89      * Ferritin Level, Routine      * Abd/Pelv W/Wo Con CT, Month         Dx: Left upper quadrant abdominal tenderness - R10.812      * Magnesium Serum, Routine         Dx: Elevated ferritin - R79.89            Plan      1) Labs today with CBC, CMP, iron panel and ferritin.             Continue every 4 month plhebotomy for ferritin greater than 200.             2) Close follow up with GI for fatty liver.             3) CT abdomen / pelvis to evaluate for left upper quandrant     pain/tenderness/splenomegaly in 1 month.             4) Discussed smoking cessation with patient. Will discuss again in 1 month.             Follow up with NP in 1 month to review lab results and CT scan.            Pain      Pain Zero Today            Advanced Care Plan Discussion      Declines Discussion [id number]            Patient Education            Ferritin      Nonalcoholic Fatty Liver Disease      Patient Education Provided:  Yes            Electronically signed by SHARITA GOMEZ onc  04/01/2020 15:37       Disclaimer: Converted document may not contain table formatting or lab diagrams. Please see [employer] for the authenticated document.

## 2021-05-28 NOTE — PROGRESS NOTES
Patient: GAL FARAH     Acct: [id number]     Report: #[id number]  UNIT #: [id number]     : 1970    Encounter Date:2019  PRIMARY CARE: RODNEY OCHOA  ***Signed***  --------------------------------------------------------------------------------------------------------------------  NURSE INTAKE      Visit Type      Established Patient Visit            Chief Complaint      elevated ferritin            Referring Provider/Copies To      Referring Provider:  Yaakov Lu      Primary Care Provider:  RODNEY OCHOA            History and Present Illness      Past History      This is a 45-year-old female worked up for elevated ferritin and the test came     back as positive for hemochromatosis.  Since her last visit patient has had     drenching night sweats and weight loss.      -August 3, 2016. Iron is 58, TIBC is 240, iron saturation is 24, ferritin is     296. BMP and TSH were within normal limits. Lipase was normal. CBC was within     normal limits.      -.  Iron is 61.  TIBC is 310.  Iron saturation is 20.  Ferritin     is 265.      -2017.  Patient had a CT chest, abdomen, and pelvis the results     showed mild bilateral non-specific hilar adenopathy up to 1.2 cm. Bilateral     lower lobes show increased secretion with mild adjacent pneumonitis, possibly     bronchitis.       -.  WBC 7.6.  Hemoglobin 14.2.  Platelet count 239,000.  Iron     70.  TIBC 280.  Percent saturation 25.  Transferrin 196,000.  Ferritin 248.      Based level was normal at 27.      -.  CT abdomen and pelvis showed status post hysterectomy. 1.4 cm     water density lesion on the right ovary probably       represents a functional cyst. Also a fatty liver.      -May 5-2017.  WBC 9.16.  Hemoglobin 13.5.  Platelet count 333,000.  Ferritin     263.      -.  Cervical spine MRI showed multilevel degenerative changes     greatest at C5-C6.  Moderate central canal  stenosis and mild to moderate     foraminal stenosis.      -February 8, 2018. WBC 7.60, Hemoglobin 12.90, Platelet count 296,000. Vitamin     B-12 167, folate 9, Iron 112, TIBC 269, Iron sat 42%, Ferritin 159.       June 8, 2018. Presents with  for follow up for B-12 deficiency,     hyperferritinemia. Reports she feels better with the B-12 injections. Missed     dose last month and fatigue worsened. Reports large amount of rectal bleeding     when she wipes. She thinks it may be a hemmorrhoid. Has not had a phlebotomy     since last September 2017. Goal is to keep Ferritin < 200. Last ferritin was 195    in April, 2017. CT pelvis was reviewed with patient showed left buttock abscess     for which she received antibiotics.       June 26,2018. Presents for follow up for hyperferritinemia and B-12 deficiency.     Reports left upper abdominal discomfort,intermittently. Worsening fatigue since     B-12 injections stopped.  Labs from june 8 with B-12 of 416. Iron studies     ferritin 195, iron 48, TIBC 292, iron sat 16%. Has intermittent rectal bleeding.    Has GI consult scheduled for August 13. Denies any worsening fatigue.       August 8-2018.  Abdominal ultrasound is normal      August .  WBC 8.14.  Hemoglobin 13.4.  Platelet count 254,000.      Creatinine 0.98            HPI - Hematology Interim      Presents for follow up for elevated ferritin:             1) Elevated Ferritin:  Was seen by Dr. Mason in March for elevated ferritin.     She is noted to have elevated ferritin in the past and has received phlebotomies    in the past. Last noted phlebotomy at [facility] was on 9/20/2017, but patient states     she knows she has had at 3 prior phlebotomies. Hemoglobin in 3/20/19 was 13.40,     Ferritin level was 251. Last phlebotomy order was to hold phlebotomies for     ferritin of 200 or less. Previously, tested for hemochromatosis which was     negative. Reviewed CT scan from january 2019 with patient. She  does report she     is up to date on breast mammogram, pap smears and all have been normal.             2) B-12 deficiency: B-12 level 3/20/19 of 512. Folate was normal. Previous B-12     injections. At this time, she does not need B-12 injections. Patient was     instructed to take oral B-12 tablets, 1000 mcg daily.             3) Tobacco abuse: Chronic tobacco use of 1 PPD. This may account for elevated     ferritin as well. I would encourage her to reduce and quit her tobacco use.            Most Recent Lab Findings      Laboratory Tests      3/20/19 14:38            Laboratory Tests            Test       3/20/19      14:38             Ferritin       251 ng/mL      ()            PAST, FAMILY   Past Medical History      Past Medical History:  Arthritis            Past Surgical History      Biopsy (cysts removed from rt breast), Cholecystectomy, Fracture Repair (rt     knee)            Family History      Family History:  Lymphoma (MOTHER)            Social History      Marital Status:        Lives independently:  Yes      Number of Children:  1      Occupation:  housewife            Tobacco Use      Tobacco status:  Current every day smoker, Former smoker      Smoking packs/day:  1      Quit status:  Quit date established            Alcohol Use      Alcohol intake:  None            Substance Use      Substance use:  Denies use            REVIEW OF SYSTEMS      General:  Admits: Fatigue;          Denies: Appetite Change, Fever, Night Sweats, Weight Gain, Weight Loss      Eye:  Denies: Blurred Vision, Corrective Lenses, Diplopia, Eye Irritation, Eye     Pain, Eye Redness, Spots in Vision, Vision Loss      ENT:  Denies: Headache, Hearing Loss, Hoarseness, Seizures, Sinus Congestion,     Sore Throat      Cardiovascular:  Denies: Chest Pain, Edema Ankles, Edema Legs, Irregular     Heartbeat, Palpitations      Respiratory:  Denies: Coughing Blood, Productive Cough, Shortness of Air,     Wheezing       Gastrointestinal:  Denies: Bloody Stools, Constipation, Diarrhea, Frequent     Heartburn, Nausea, Problem Swallowing, Tarry Stools, Unable to Control Bowels,     Vomiting      Other      PAIN NEAR LIVER      Genitourinary (female):  Denies: Blood in Urine, Decrease Urine Stream, Frequent    Urination, Incontinence, Painful Urination      Musculoskeletal:  Denies: Back Pain, Leg Cramps, Muscle Pain, Muscle Weakness,     Painful Joints, Swollen Joints      Integumentary:  Denies: Bleeds Easily, Bruises Easily, Hair Changes, Jaundice,     Lesions, Mole Changes, Nail Changes, Pigment Changes, Rash, Skin Discoloration      Neurologic:  Denies: Dizziness, Fainting, Numbness\Tingling, Paralysis, Seizures      Psychiatric:  Denies: Anxiety, Confused, Depression, Disoriented, Memory Loss      Endocrine:  Denies: Cold Intolerance, Diabetes, Excessive Sweating, Excessive     Thirst, Excessive Urination, Heat Intolerance, Flushing, Hyperthyroidism,     Hypothyroidism      Hematologic/Lymphatic:  Denies: Bruising, Bleeding, Enlarged Lymph Nodes,     Recurrent Infections, Transfusions      Reproductive:  Denies: Menopause, Heavy Periods, Pregnant, Still Menstruating            VITAL SIGNS AND SCORES      Vitals      Height 5 ft 5.25 in / 165.74 cm      Weight 202 lbs 9.644 oz / 91.9 kg      BSA 1.99 m2      BMI 33.5 kg/m2      Temperature 99.2 F / 37.33 C - Temporal      Pulse 107      Respirations 18      Blood Pressure 146/74 Sitting, Left Arm      Pulse Oximetry 98%, RM AIR            Pain Score      Experiencing any pain?:  Yes      Pain Scale Used:  Numerical      Pain Intensity:  7            Fatigue Score      Experiencing any fatigue?:  Yes      Fatigue (0-10 scale):  8            EXAM      General Appearance:  Positive for: Alert, Oriented x3, Cooperative      Eye:  Positive for: Moist Conjunctiva, PERRLA, Reactive to Light      HEENT:  Positive for: Oropharynx clear;          Negative for: Erythema      Neck:   Positive for: Full ROM, Supple      Respiratory:  Positive for: CTAB, Normal Respiratory Effort      Abdomen/Gastro:  Positive for: Normal Active Bowel Sounds, Soft;          Negative for: Distention, Tenderness      Cardiovascular:  Positive for: RRR;          Negative for: Murmur, Peripheral Edema      Skin:  Positive for: Normal Temperature;          Negative for: Rash      Psychiatric:  Positive for: AAO X 3, Appropriate Affect, Intact Judgement      Neurologic:  Positive for: Cranial Ner II-XII Intact, Deep Tendon Reflexes;          Negative for: Dizziness, Headache      Genitourinary:  Negative for: Bladder Distention      Musculoskeletal:  Positive for: Full ROM Lower Extremety, Full ROM Upper     Extremety      Lower Extremities:  Positive for: Normal Gait and Station, Pedal Pulses Intact,     Pedal Pulses Symetrical      Upper Extremities:  Negative for: Edema, Weakness      Lymphatic:  Negative for: Axillary, Cervical, Supraclavicular            PREVENTION      Date Influenza Vaccine Given:  Oct 1, 2018      Influenza Vaccine Declined:  No      2 or More Falls Past Year?:  No      Fall Past Year with Injury?:  No      Encouraged to follow-up with:  PCP regarding preventative exams.      Chart initiated by      [facility]            ALLERGY/MEDS      Allergies      Coded Allergies:             PENICILLINS (Verified  Allergy, Unknown, RASH, 3/20/19)           SULFA (SULFONAMIDE ANTIBIOTICS) (Verified  Adverse Reaction, Unknown, N/V,     3/20/19)            Medications      Last Reconciled on 4/3/19 14:05 by SHARITA GOMEZ      Venlafaxine Hcl XR (Venlafaxine XR*) 75 Mg Cap.er.24h      225 MG PO QDAY, CAP         Reported         4/3/19       Baclofen (BACLOFEN) 20 Mg Tablet      20 MG PO TID, #30 TAB         Reported         4/3/19       Ergocalciferol (Ergocalciferol*) 50,000 Unit Capsule      78190 UNITS PO MO@09, #4 CAP 0 Refills         Reported         3/20/19       Buspirone Hcl (Buspar) 15  Mg Tablet      15 MG PO TID, #90 TAB         Reported         3/20/19       traZODone HCl (Desyrel) 50 Mg Tablet      50 MG PO QDAY, #30 TAB 0 Refills         Reported         3/20/19       traZODone HCl (Desyrel) 50 Mg Tablet      25 MG PO QDAY, #15 TAB 0 Refills         Reported         3/20/19       Omeprazole (Omeprazole*) 20 Mg Tablet.      20 MG PO QDAY, #30 CAP 1 Refill         Prov: SVETLANA MURRAY         10/10/18       Ranitidine HCl (Ranitidine*) 300 Mg Tablet      300 MG PO HS for ACID REFLUX, #30 TAB         Reported         4/8/16      Medications Reviewed:  Changes made to meds            IMPRESSION/PLAN      Impression      1) Elevated Ferritin:  Was seen by Dr. Mason in March for elevated ferritin.     She is noted to have elevated ferritin in the past and has received phlebotomies    in the past. Last noted phlebotomy at [facility] was on 9/20/2017, but patient states     she knows she has had at 3 prior phlebotomies. Hemoglobin in 3/20/19 was 13.40,     Ferritin level was 251. Last phlebotomy order was to hold phlebotomies for     ferritin of 200 or less. Previously, tested for hemochromatosis which was     negative. Reviewed CT scan from january 2019 with patient. She does report she     is up to date on breast mammogram, pap smears and all have been normal. Orders     were given for patient to have phlebotomy every 3 months for 1 year to hold if f    erritin level is less than 200.             2) B-12 deficiency: B-12 level 3/20/19 of 512. Folate was normal. Previous B-12     injections. At this time, she does not need B-12 injections. Patient was     instructed to take oral B-12 tablets, 1000 mcg daily.             3) Tobacco abuse: Chronic tobacco use of 1 PPD. This may account for elevated     ferritin as well. I would encourage her to reduce and quit her tobacco use.            Diagnosis      Notes      Changed Medications      * traZODone HCl (Desyrel) 50 MG TABLET: 25 MG PO QDAY #15          Instructions: A PILL IN A HALF      * BACLOFEN 20 MG TABLET: 20 MG PO TID #30         Replaced 10 MG TABLET: 10 MG PO BID #60      * Venlafaxine Hcl XR (Venlafaxine XR*) 75 MG CAP.ER.24H: 225 MG PO QDAY         Replaced Venlafaxine HCl XR (Venlafaxine XR*) 150 MG TAB.ER.24:         150 MG PO QDAY      Discontinued Medications      * Cyanocobalamin (Cyanocobalamin Injection) 1,000 MCG/1 ML VIAL: 1,000 MCG SUBQ       QMONTH #4      * Syringe W-Needle,Disposab,3 ml (Syringe 3ml with 5/8 inch Needle) 1 EACH       DISP.SYRIN: SYRINGE XX ASDIR #4            Plan      1. No labs today. Phlebotomy orders written for every  3 months x 1 year to hold    for ferritin less than 200. Return in 1 year for follow up. Call with any     questions or concerns.       2. Stop B-12 injections. B-12 1000 mcg daily by tablet.            Patient Education      Patient Education Provided:  Yes            Topics Patient Counseled on      lymph nodes      hair loss      tobacco use            Alcohol Counseling      Counseling given:  Reduce to 2 or less/day                 Disclaimer: Converted document may not contain table formatting or lab diagrams. Please see [employer] for the authenticated document.

## 2021-06-03 RX ORDER — ERGOCALCIFEROL 1.25 MG/1
50000 CAPSULE ORAL WEEKLY
COMMUNITY
End: 2022-03-17 | Stop reason: SDUPTHER

## 2021-06-03 RX ORDER — LANOLIN ALCOHOL/MO/W.PET/CERES
1000 CREAM (GRAM) TOPICAL DAILY
COMMUNITY
End: 2022-11-22

## 2021-06-03 RX ORDER — BACLOFEN 20 MG/1
20 TABLET ORAL 3 TIMES DAILY
COMMUNITY
End: 2021-06-23 | Stop reason: SDUPTHER

## 2021-06-03 RX ORDER — TRAZODONE HYDROCHLORIDE 50 MG/1
50 TABLET ORAL NIGHTLY
COMMUNITY
End: 2021-06-23 | Stop reason: SDUPTHER

## 2021-06-03 RX ORDER — VENLAFAXINE 75 MG/1
100 TABLET ORAL 2 TIMES DAILY
COMMUNITY
End: 2021-06-18

## 2021-06-03 RX ORDER — BUSPIRONE HYDROCHLORIDE 15 MG/1
15 TABLET ORAL 3 TIMES DAILY
COMMUNITY
End: 2021-06-23

## 2021-06-03 RX ORDER — ONDANSETRON HYDROCHLORIDE 8 MG/1
8 TABLET, FILM COATED ORAL EVERY 8 HOURS PRN
COMMUNITY
End: 2021-06-23

## 2021-06-08 ENCOUNTER — OFFICE VISIT (OUTPATIENT)
Dept: ONCOLOGY | Facility: HOSPITAL | Age: 51
End: 2021-06-08

## 2021-06-08 VITALS
HEART RATE: 71 BPM | TEMPERATURE: 97.5 F | RESPIRATION RATE: 18 BRPM | BODY MASS INDEX: 25.46 KG/M2 | DIASTOLIC BLOOD PRESSURE: 86 MMHG | WEIGHT: 153 LBS | OXYGEN SATURATION: 100 % | SYSTOLIC BLOOD PRESSURE: 164 MMHG

## 2021-06-08 DIAGNOSIS — R63.4 WEIGHT LOSS: Primary | ICD-10-CM

## 2021-06-08 DIAGNOSIS — R79.89 ELEVATED FERRITIN LEVEL: ICD-10-CM

## 2021-06-08 PROBLEM — M50.30 DDD (DEGENERATIVE DISC DISEASE), CERVICAL: Status: ACTIVE | Noted: 2021-06-08

## 2021-06-08 PROBLEM — R19.5 FECES CONTENTS ABNORMAL: Status: ACTIVE | Noted: 2021-06-08

## 2021-06-08 PROBLEM — M19.90 OSTEOARTHRITIS: Status: ACTIVE | Noted: 2021-06-08

## 2021-06-08 PROBLEM — K59.00 CONSTIPATION: Status: ACTIVE | Noted: 2021-06-08

## 2021-06-08 PROBLEM — M17.10 PRIMARY LOCALIZED OSTEOARTHROSIS, LOWER LEG: Status: ACTIVE | Noted: 2017-10-13

## 2021-06-08 PROBLEM — K21.9 GASTRIC REFLUX: Status: ACTIVE | Noted: 2021-06-08

## 2021-06-08 PROBLEM — Z79.4 ENCOUNTER FOR LONG-TERM (CURRENT) USE OF INSULIN: Status: ACTIVE | Noted: 2019-03-18

## 2021-06-08 PROBLEM — IMO0002 ULCERATIVE LESION: Status: ACTIVE | Noted: 2021-06-08

## 2021-06-08 PROBLEM — K63.9 BOWEL DISEASE: Status: ACTIVE | Noted: 2021-06-08

## 2021-06-08 PROBLEM — M79.89 LIMB SWELLING: Status: ACTIVE | Noted: 2021-06-08

## 2021-06-08 PROBLEM — M54.2 NECK PAIN: Status: ACTIVE | Noted: 2021-06-08

## 2021-06-08 PROBLEM — M19.90 ARTHRITIS: Status: ACTIVE | Noted: 2021-06-08

## 2021-06-08 PROBLEM — K64.9 HEMORRHOIDS: Status: ACTIVE | Noted: 2021-06-08

## 2021-06-08 PROBLEM — M79.606 LEG PAIN: Status: ACTIVE | Noted: 2021-06-08

## 2021-06-08 PROBLEM — K58.9 IRRITABLE BOWEL SYNDROME: Status: ACTIVE | Noted: 2021-06-08

## 2021-06-08 PROBLEM — K62.5 RECTAL BLEEDING: Status: ACTIVE | Noted: 2021-06-08

## 2021-06-08 PROBLEM — J30.2 SEASONAL ALLERGIES: Status: ACTIVE | Noted: 2021-06-08

## 2021-06-08 PROBLEM — N32.89 BLADDER WALL THICKENING: Status: ACTIVE | Noted: 2020-07-01

## 2021-06-08 PROBLEM — K52.3 INDETERMINATE COLITIS: Status: ACTIVE | Noted: 2021-06-08

## 2021-06-08 PROBLEM — M17.9 DJD (DEGENERATIVE JOINT DISEASE) OF KNEE: Status: ACTIVE | Noted: 2021-06-08

## 2021-06-08 PROBLEM — F32.A DEPRESSION: Status: ACTIVE | Noted: 2021-06-08

## 2021-06-08 PROBLEM — K21.9 ESOPHAGEAL REFLUX: Status: ACTIVE | Noted: 2021-06-08

## 2021-06-08 PROBLEM — N32.9 BLADDER DISORDER: Status: ACTIVE | Noted: 2021-06-08

## 2021-06-08 PROBLEM — F41.9 ANXIETY: Status: ACTIVE | Noted: 2021-06-08

## 2021-06-08 PROBLEM — F39 MOOD DISORDER: Status: ACTIVE | Noted: 2021-06-08

## 2021-06-08 PROBLEM — R14.0 BLOATING SYMPTOM: Status: ACTIVE | Noted: 2021-06-08

## 2021-06-08 PROBLEM — R10.11 ABDOMINAL PAIN, RUQ: Status: ACTIVE | Noted: 2021-06-08

## 2021-06-08 PROBLEM — K29.60 EROSIVE GASTRITIS: Status: ACTIVE | Noted: 2021-06-08

## 2021-06-08 PROBLEM — K21.9 GASTROESOPHAGEAL REFLUX DISEASE: Status: ACTIVE | Noted: 2021-06-08

## 2021-06-08 PROBLEM — J30.9 ALLERGIC RHINITIS: Status: ACTIVE | Noted: 2021-06-08

## 2021-06-08 PROCEDURE — G0463 HOSPITAL OUTPT CLINIC VISIT: HCPCS | Performed by: NURSE PRACTITIONER

## 2021-06-08 PROCEDURE — 99213 OFFICE O/P EST LOW 20 MIN: CPT | Performed by: NURSE PRACTITIONER

## 2021-06-08 RX ORDER — VENLAFAXINE HYDROCHLORIDE 150 MG/1
TABLET, EXTENDED RELEASE ORAL
COMMUNITY
End: 2021-06-18 | Stop reason: SDUPTHER

## 2021-06-08 RX ORDER — LACTULOSE 10 G/15ML
SOLUTION ORAL
COMMUNITY
End: 2021-06-23

## 2021-06-08 RX ORDER — ONDANSETRON 4 MG/1
TABLET, FILM COATED ORAL
COMMUNITY
Start: 2021-03-09 | End: 2021-06-23

## 2021-06-08 RX ORDER — HYDROCODONE BITARTRATE AND ACETAMINOPHEN 7.5; 325 MG/1; MG/1
TABLET ORAL
COMMUNITY
End: 2021-06-23

## 2021-06-08 RX ORDER — ALBUTEROL SULFATE 90 UG/1
AEROSOL, METERED RESPIRATORY (INHALATION)
COMMUNITY

## 2021-06-08 RX ORDER — IBUPROFEN 200 MG
TABLET ORAL
COMMUNITY
End: 2021-06-23

## 2021-06-08 RX ORDER — ONDANSETRON 8 MG/1
TABLET, ORALLY DISINTEGRATING ORAL
COMMUNITY
End: 2022-03-15 | Stop reason: SDUPTHER

## 2021-06-08 RX ORDER — TAMSULOSIN HYDROCHLORIDE 0.4 MG/1
CAPSULE ORAL
COMMUNITY
End: 2021-06-23

## 2021-06-08 RX ORDER — ERGOCALCIFEROL 1.25 MG/1
CAPSULE ORAL
COMMUNITY
End: 2021-06-23

## 2021-06-08 RX ORDER — PANCRELIPASE 36000; 180000; 114000 [USP'U]/1; [USP'U]/1; [USP'U]/1
CAPSULE, DELAYED RELEASE PELLETS ORAL
COMMUNITY
End: 2021-06-23

## 2021-06-08 RX ORDER — SELENIUM SULFIDE 2.5 MG/100ML
LOTION TOPICAL
COMMUNITY

## 2021-06-08 RX ORDER — RANITIDINE 300 MG/1
TABLET ORAL
COMMUNITY
End: 2021-06-23

## 2021-06-08 RX ORDER — TRAZODONE HYDROCHLORIDE 100 MG/1
TABLET ORAL
COMMUNITY
End: 2021-06-23

## 2021-06-08 NOTE — PROGRESS NOTES
Subjective          Holly Morin presents to [facility] for Chief Complaint: elevated ferritin.     HPI:  Patient Reports she is here for follow up for elevated ferritin. She has had no phlebotomies recently. She is hemochromatosis negative. She has not been seen since 2019. She reports she is following with GI with the NP there. She has complaints of weight loss over the last one year and reports she is having BM's only about 1-3 times a month. She has no urge to defecate. She does rectal bleeding. She has had no recent scopes (EGD or colonoscopy). She reports constant nausea. Reports ongoing fatigue rating a 8/10 scale.     She requests B-12 injection refills today. Denies any cough, or shortness of breath.         *Previous HPI from 2019.     Past History      This is a 45-year-old female worked up for elevated ferritin and the test came     back as positive for hemochromatosis.  Since her last visit patient has had     drenching night sweats and weight loss.      -August 3, 2016. Iron is 58, TIBC is 240, iron saturation is 24, ferritin is     296. BMP and TSH were within normal limits. Lipase was normal. CBC was within     normal limits.      -December .  Iron is 61.  TIBC is 310.  Iron saturation is 20.  Ferritin     is 265.      -January 24 2017.  Patient had a CT chest, abdomen, and pelvis the results     showed mild bilateral non-specific hilar adenopathy up to 1.2 cm. Bilateral     lower lobes show increased secretion with mild adjacent pneumonitis, possibly     bronchitis.       -February 24th-2017.  WBC 7.6.  Hemoglobin 14.2.  Platelet count 239,000.  Iron     70.  TIBC 280.  Percent saturation 25.  Transferrin 196,000.  Ferritin 248.      Based level was normal at 27.      -April .  CT abdomen and pelvis showed status post hysterectomy. 1.4 cm     water density lesion on the right ovary probably       represents a functional cyst. Also a  fatty liver.      -May 5-2017.  WBC 9.16.  Hemoglobin 13.5.  Platelet count 333,000.  Ferritin     263.      -June 1-2017.  Cervical spine MRI showed multilevel degenerative changes     greatest at C5-C6.  Moderate central canal stenosis and mild to moderate     foraminal stenosis.      -February 8, 2018. WBC 7.60, Hemoglobin 12.90, Platelet count 296,000. Vitamin     B-12 167, folate 9, Iron 112, TIBC 269, Iron sat 42%, Ferritin 159.       June 8, 2018. Presents with  for follow up for B-12 deficiency,     hyperferritinemia. Reports she feels better with the B-12 injections. Missed     dose last month and fatigue worsened. Reports large amount of rectal bleeding     when she wipes. She thinks it may be a hemmorrhoid. Has not had a phlebotomy     since last September 2017. Goal is to keep Ferritin < 200. Last ferritin was 195    in April, 2017. CT pelvis was reviewed with patient showed left buttock abscess     for which she received antibiotics.       June 26,2018. Presents for follow up for hyperferritinemia and B-12 deficiency.     Reports left upper abdominal discomfort,intermittently. Worsening fatigue since     B-12 injections stopped.  Labs from june 8 with B-12 of 416. Iron studies     ferritin 195, iron 48, TIBC 292, iron sat 16%. Has intermittent rectal bleeding.    Has GI consult scheduled for August 13. Denies any worsening fatigue.       August 8-2018.  Abdominal ultrasound is normal      August .  WBC 8.14.  Hemoglobin 13.4.  Platelet count 254,000.      Creatinine 0.98          Cancer Staging  No matching staging information was found for the patient.    Oncology/Hematology History    No history exists.       Review of Systems   Constitutional: Positive for appetite change, fatigue (8/10) and unexpected weight loss. Negative for diaphoresis, fever and unexpected weight gain.   HENT: Negative for hearing loss, mouth sores, sore throat, swollen glands, trouble swallowing and voice change.     Eyes: Positive for blurred vision.   Respiratory: Negative for cough, shortness of breath and wheezing.    Cardiovascular: Negative for chest pain and palpitations.   Gastrointestinal: Positive for abdominal pain, anal bleeding, blood in stool, constipation, diarrhea, nausea, rectal pain and vomiting.   Endocrine: Negative for cold intolerance and heat intolerance.   Genitourinary: Positive for difficulty urinating and dysuria. Negative for frequency, hematuria and urinary incontinence.   Musculoskeletal: Positive for back pain (LOW BACK). Negative for arthralgias and myalgias.   Skin: Negative for rash, skin lesions and bruise.   Neurological: Negative for dizziness, seizures, weakness, numbness and headache.   Hematological: Does not bruise/bleed easily.   Psychiatric/Behavioral: Positive for sleep disturbance and stress. Negative for depressed mood. The patient is not nervous/anxious.      Current Outpatient Medications on File Prior to Visit   Medication Sig Dispense Refill   • ergocalciferol (ERGOCALCIFEROL) 1.25 MG (80386 UT) capsule Take 50,000 Units by mouth 1 (One) Time Per Week.     • Omeprazole 20 MG Tablet Delayed Release Dispersible Take 20 mg by mouth Daily.     • vitamin B-12 (CYANOCOBALAMIN) 1000 MCG tablet Take 1,000 mcg by mouth Daily.     • albuterol sulfate HFA (Ventolin HFA) 108 (90 Base) MCG/ACT inhaler Ventolin HFA 90 mcg/actuation inhalation HFA aerosol inhaler inhale 1 puff (90 mcg) by inhalation route every 6 hours as needed   Active     • Cholecalciferol 50 MCG (2000 UT) capsule Vitamin D3 50 mcg (2,000 unit) capsule   TAKE 1 CAPSULE BY MOUTH EVERY DAY     • cimetidine (TAGAMET) 200 MG tablet cimetidine 200 mg tablet   TAKE 1 TABLET BY MOUTH TWICE DAILY - 30 MINUTES BEFORE A MEAL     • ondansetron ODT (ZOFRAN-ODT) 8 MG disintegrating tablet ondansetron 8 mg disintegrating tablet   DISSOLVE 1 TABLET in mouth THREE TIMES DAILY AS NEEDED FOR NAUSEA     • selenium sulfide (SELSUN) 2.5 %  lotion selenium sulfide 2.5 % lotion   APPLY TO THE AFFECTED AREA(S), lather, leave in place for 10 minutes AND then rinse off with water once daily for 7 days       No current facility-administered medications on file prior to visit.       Allergies   Allergen Reactions   • Sulfa Antibiotics Swelling   • Penicillins Rash     Past Medical History:   Diagnosis Date   • Anemia    • Arthritis of knee    • Carpal tunnel syndrome    • Constipation    • DDD (degenerative disc disease), lumbar    • Depression    • Fatigue    • GERD (gastroesophageal reflux disease)    • Heartburn    • Hiatal hernia    • High serum ferritin     DR HOWARD   • History of anesthesia reaction     SLOW TO WAKE   • Hx of common duodenal ulcer    • Knee pain, right     PAIN AND SWELLING   • LUQ pain    • Nausea    • Rectal bleeding    • Social anxiety disorder    • Wears glasses    • Weight loss      Past Surgical History:   Procedure Laterality Date   • BLADDER SURGERY      BLADDER LIFT   • HYSTERECTOMY     • KNEE SURGERY Right     SX   • LAPAROSCOPIC CHOLECYSTECTOMY     • TEETH EXTRACTION     • X-STOP IMPLANTATION       Social History     Socioeconomic History   • Marital status:      Spouse name: Not on file   • Number of children: Not on file   • Years of education: Not on file   • Highest education level: Not on file   Tobacco Use   • Smoking status: Current Every Day Smoker     Packs/day: 1.00     Last attempt to quit: 1/15/2018     Years since quitting: 3.4   • Smokeless tobacco: Never Used   • Tobacco comment: 1 PPD SMOKED FOR 10 YEARS   Vaping Use   • Vaping Use: Never used     Family History   Problem Relation Age of Onset   • Pancreatic cancer Father    • Lung cancer Paternal Grandfather        There is no immunization history on file for this patient.    Objective   Physical Exam       Constitutional: Awake, alert   Eyes: PERRLA, sclerae anicteric, no conjunctival injection   HENT: NCAT, mucous membranes moist   Neck: Supple, no  thyromegaly, no lymphadenopathy, trachea midline   Respiratory: Clear to auscultation bilaterally, nonlabored respirations    Cardiovascular: RRR, no murmurs, rubs, or gallops, palpable pedal pulses bilaterally   Gastrointestinal:Diminished bowel sounds, soft, nontender, nondistended   Musculoskeletal: No bilateral ankle edema, no clubbing or cyanosis to extremities   Psychiatric: Appropriate affect, cooperative   Neurologic: Oriented x 3, strength symmetric in all extremities, Cranial Nerves grossly intact to confrontation, speech clear   Skin:bruising noted.       Vitals:    06/08/21 1052   BP: 164/86   Pulse: 71   Resp: 18   Temp: 97.5 °F (36.4 °C)   TempSrc: Temporal   SpO2: 100%   Weight: 69.4 kg (153 lb)   PainSc:   6   PainLoc: Knee  Comment: BLE     ECOG score: 1               PT/OT Functional Screening:   Speech Functional Screening:   Rehab to be ordered:     Result Review :   The following data was reviewed by: ASAF Dawkins on 06/08/2021:  Lab Results   Component Value Date    HGB 13.4 07/06/2021    HCT 39.2 07/06/2021    MCV 96.1 07/06/2021     07/06/2021    WBC 7.92 07/06/2021    NEUTROABS 4.50 07/06/2021    LYMPHSABS 2.59 07/06/2021    MONOSABS 0.50 07/06/2021    EOSABS 0.24 07/06/2021    BASOSABS 0.06 07/06/2021     Lab Results   Component Value Date    GLUCOSE 104 (H) 07/06/2021    BUN 4 (L) 07/06/2021    CREATININE 0.92 07/06/2021     07/06/2021    K 3.3 (L) 07/06/2021    CL 96 (L) 07/06/2021    CO2 33.8 (H) 07/06/2021    CALCIUM 8.9 07/06/2021    PROTEINTOT 6.9 07/06/2021    ALBUMIN 3.90 07/06/2021    BILITOT 0.2 07/06/2021    ALKPHOS 89 07/06/2021    AST 21 07/06/2021    ALT 11 07/06/2021           Assessment and Plan    Diagnoses and all orders for this visit:    1. Weight loss (Primary)  -     Comprehensive Metabolic Panel; Future  -     Iron Profile; Future  -     Ferritin; Future  -     CBC & Differential; Future  -     Comprehensive Metabolic Panel; Future  -      Lactate Dehydrogenase; Future  -     CT Chest With Contrast; Future  -     CT Abdomen With & Without Contrast; Future    2. Elevated ferritin level  -     CBC & Differential; Future  -     Iron Profile; Future  -     Ferritin; Future  -     Iron Profile; Future  -     Ferritin; Future  -     Folate; Future  -     Vitamin B12; Future    1) Elevated ferrtin:     2) Weight loss:     3) Constipation:             1. Plan: Labs today: CBC, CMP, iron profile, ferritin, folate, B-12.     2) 3) CT CAP with oral contrast in 1 month.     Follow up in 1 month with NP to review lab results and scans.     Needs to follow up with GI MD for further evaluation for weight loss and constpation.               Electronically signed by ASAF Dawkins, 07/07/21, 12:33 PM EDT.          Patient Follow Up:Patient was given instructions and counseling regarding her condition or for health maintenance advice. Please see specific information pulled into the AVS if appropriate.

## 2021-06-14 RX ORDER — VENLAFAXINE HYDROCHLORIDE 150 MG/1
150 CAPSULE, EXTENDED RELEASE ORAL DAILY
Qty: 30 CAPSULE | Refills: 0 | OUTPATIENT
Start: 2021-06-14 | End: 2021-07-14

## 2021-06-18 NOTE — TELEPHONE ENCOUNTER
Caller: Holly Morin    Relationship: Self    Best call back number:[phone]    Medication needed:   Requested Prescriptions      No prescriptions requested or ordered in this encounter       When do you need the refill by: ASAP    What additional details did the patient provide when requesting the medication: PATIENT HAS APPOINTMENT WITH [facility] ON 06/23/21 AND JUST NEEDS ENOUGH TILL APPOINTMENT.    Does the patient have less than a 3 day supply:  [x] Yes  [] No    What is the patient's preferred pharmacy:        [facility]  [id number] [place], [place] [address] [phone]

## 2021-06-20 RX ORDER — VENLAFAXINE HYDROCHLORIDE 150 MG/1
150 TABLET, EXTENDED RELEASE ORAL
Qty: 30 EACH | Refills: 0 | Status: SHIPPED | OUTPATIENT
Start: 2021-06-20 | End: 2021-06-23 | Stop reason: SDUPTHER

## 2021-06-22 RX ORDER — TRAZODONE HYDROCHLORIDE 50 MG/1
TABLET ORAL
Qty: 60 TABLET | Refills: 0 | OUTPATIENT
Start: 2021-06-22

## 2021-06-23 ENCOUNTER — OFFICE VISIT (OUTPATIENT)
Dept: FAMILY MEDICINE CLINIC | Facility: CLINIC | Age: 51
End: 2021-06-23

## 2021-06-23 VITALS
OXYGEN SATURATION: 99 % | BODY MASS INDEX: 23.95 KG/M2 | WEIGHT: 149 LBS | HEIGHT: 66 IN | DIASTOLIC BLOOD PRESSURE: 100 MMHG | SYSTOLIC BLOOD PRESSURE: 140 MMHG | TEMPERATURE: 98.6 F | HEART RATE: 68 BPM

## 2021-06-23 DIAGNOSIS — M79.10 PAIN IN THE MUSCLES: ICD-10-CM

## 2021-06-23 DIAGNOSIS — G47.09 OTHER INSOMNIA: ICD-10-CM

## 2021-06-23 DIAGNOSIS — M51.36 DEGENERATIVE DISC DISEASE, LUMBAR: ICD-10-CM

## 2021-06-23 DIAGNOSIS — M53.86 SCIATICA OF LEFT SIDE ASSOCIATED WITH DISORDER OF LUMBAR SPINE: ICD-10-CM

## 2021-06-23 DIAGNOSIS — F33.9 EPISODE OF RECURRENT MAJOR DEPRESSIVE DISORDER, UNSPECIFIED DEPRESSION EPISODE SEVERITY (HCC): Primary | ICD-10-CM

## 2021-06-23 DIAGNOSIS — Z72.0 TOBACCO USE: ICD-10-CM

## 2021-06-23 DIAGNOSIS — F41.9 ANXIETY: ICD-10-CM

## 2021-06-23 DIAGNOSIS — E55.9 VITAMIN D DEFICIENCY: ICD-10-CM

## 2021-06-23 DIAGNOSIS — F43.21 GRIEF: ICD-10-CM

## 2021-06-23 DIAGNOSIS — M50.30 DDD (DEGENERATIVE DISC DISEASE), CERVICAL: ICD-10-CM

## 2021-06-23 DIAGNOSIS — D72.9 ABNORMAL WHITE BLOOD CELL (WBC) COUNT: ICD-10-CM

## 2021-06-23 DIAGNOSIS — M79.605 PAIN OF LEFT LOWER EXTREMITY: ICD-10-CM

## 2021-06-23 PROBLEM — K21.9 GASTRIC REFLUX: Status: RESOLVED | Noted: 2021-06-08 | Resolved: 2021-06-23

## 2021-06-23 PROBLEM — M51.369 DEGENERATIVE DISC DISEASE, LUMBAR: Status: ACTIVE | Noted: 2021-06-23

## 2021-06-23 PROCEDURE — 99214 OFFICE O/P EST MOD 30 MIN: CPT | Performed by: NURSE PRACTITIONER

## 2021-06-23 RX ORDER — BACLOFEN 20 MG/1
20 TABLET ORAL 3 TIMES DAILY
Qty: 90 TABLET | Refills: 5 | Status: SHIPPED | OUTPATIENT
Start: 2021-06-23 | End: 2022-03-15 | Stop reason: SDUPTHER

## 2021-06-23 RX ORDER — TRAZODONE HYDROCHLORIDE 50 MG/1
50 TABLET ORAL NIGHTLY
Qty: 30 TABLET | Refills: 5 | Status: SHIPPED | OUTPATIENT
Start: 2021-06-23 | End: 2022-08-29

## 2021-06-23 RX ORDER — VENLAFAXINE HYDROCHLORIDE 150 MG/1
150 TABLET, EXTENDED RELEASE ORAL
Qty: 30 EACH | Refills: 5 | Status: SHIPPED | OUTPATIENT
Start: 2021-06-23 | End: 2021-10-30 | Stop reason: DRUGHIGH

## 2021-06-23 NOTE — PROGRESS NOTES
Chief Complaint  Depression (Refill Trazadone, Effexor, Zofran) and Leg Pain (Left leg pain from after pulling leg out of boot.)    Subjective          Holly Morin presents to [facility]  pt here for the med refills and then pt reports the depression far worse and needs to see a psychiatrist--or a counselor--and her  told her she couldn't see anyone--as no one needs to know their business--been very stressful with the loss of her daddy and then her not feeling heard or supported at home--and not allowed to speak her feelings--pt denies all SI/HI --we did the depression screening and pt was at 20--    Also pt reports the sciatica is flared up and not had the baclofen to take and no longer in pain mgt    Then also reports the  Muscles with pain to the backs of the thighs--and knees painful to even bend them --like fluid pouches--per pt report--and her oncologist/henatlogist recommended a rheumatologist     Depression  Visit Type: follow-up  Patient presents with the following symptoms: depressed mood, excessive worry, fatigue, feelings of hopelessness, feelings of worthlessness, hypersomnia, insomnia, irritability, malaise, muscle tension, nervousness/anxiety and panic.  Patient is not experiencing: suicidal ideas, suicidal planning and thoughts of death.  Frequency of symptoms: most days   Severity: causing significant distress   Sleep quality: fair  Nighttime awakenings: occasional  Compliance with medications:  % (except if runs out)  Side effects:  Insomnia  Insomnia  This is a chronic problem. The current episode started more than 1 year ago. The problem occurs intermittently. The problem has been unchanged. Associated symptoms include anorexia, fatigue, joint swelling, myalgias and numbness. The symptoms are aggravated by stress. She has tried sleep and rest (taking her trazodone ) for the symptoms. The treatment provided moderate relief.   Back Pain  This is a  "chronic problem. The current episode started more than 1 year ago. The problem occurs daily. The problem has been gradually worsening since onset. The pain is present in the lumbar spine. The pain radiates to the left thigh, left knee and left foot. The pain is at a severity of 7/10. The pain is severe. The pain is the same all the time. The symptoms are aggravated by position, sitting, standing, bending and twisting. Stiffness is present all day. Associated symptoms include leg pain, numbness, paresthesias, pelvic pain and tingling. Pertinent negatives include no bladder incontinence or bowel incontinence. Risk factors include menopause. She has tried NSAIDs, walking, muscle relaxant, ice, heat, home exercises, bed rest and analgesics for the symptoms. The treatment provided mild relief.   Nicotine Dependence  Presents for follow-up visit. Symptoms include fatigue, insomnia and irritability. The symptoms have been stable. Her first smoke is from 8 to 10 AM. She smokes 1 pack of cigarettes per day. Compliance with prior treatments: pt very stressed right now and cannot think of quitting        Objective   Vital Signs:   /100   Pulse 68   Temp 98.6 °F (37 °C)   Ht 166.4 cm (65.5\")   Wt 67.6 kg (149 lb)   SpO2 99%   BMI 24.42 kg/m²     Physical Exam  Constitutional:       Appearance: Normal appearance.   HENT:      Head: Normocephalic.      Right Ear: Tympanic membrane normal.      Left Ear: Tympanic membrane normal.      Nose: Nose normal.      Mouth/Throat:      Mouth: Mucous membranes are moist.   Eyes:      Pupils: Pupils are equal, round, and reactive to light.   Cardiovascular:      Rate and Rhythm: Normal rate and regular rhythm.      Heart sounds: Normal heart sounds.   Pulmonary:      Effort: Pulmonary effort is normal.      Breath sounds: Normal breath sounds.   Abdominal:      General: Bowel sounds are normal.      Palpations: Abdomen is soft.      Tenderness: There is abdominal tenderness in " the left upper quadrant.      Comments: Pt reports this is chronically    Musculoskeletal:         General: Tenderness present.      Cervical back: Normal range of motion and neck supple.      Lumbar back: Spasms, tenderness and bony tenderness present. Decreased range of motion.      Comments: And to the bilat arms and upper and and mid and lower back muscles and to the thighs and knees the muscles very tender   Skin:     General: Skin is warm and dry.   Neurological:      Mental Status: She is alert and oriented to person, place, and time.   Psychiatric:         Mood and Affect: Mood normal.         Behavior: Behavior normal.         Judgment: Judgment normal.        Result Review :                 Assessment and Plan    Diagnoses and all orders for this visit:    1. Episode of recurrent major depressive disorder, unspecified depression episode severity (CMS/HCC) (Primary)  -     venlafaxine (EFFEXOR) 150 MG tablet sustained-release 24 hour 24 hr tablet; Take 1 tablet by mouth Daily With Breakfast.  Dispense: 30 each; Refill: 5  -     Ambulatory Referral to Psychiatry    2. Sciatica of left side associated with disorder of lumbar spine  -     baclofen (LIORESAL) 20 MG tablet; Take 1 tablet by mouth 3 (Three) Times a Day.  Dispense: 90 tablet; Refill: 5    3. Tobacco use    4. Degenerative disc disease, lumbar    5. DDD (degenerative disc disease), cervical    6. Anxiety  -     Ambulatory Referral to Psychiatry    7. Other insomnia  -     traZODone (DESYREL) 50 MG tablet; Take 1 tablet by mouth Every Night.  Dispense: 30 tablet; Refill: 5  -     Ambulatory Referral to Psychiatry    8. Pain of left lower extremity  -     Uric acid  -     Antistreptolysin O screen  -     Rheumatoid factor  -     C-reactive protein  -     JEFF    9. Pain in the muscles  -     Uric acid  -     Antistreptolysin O screen  -     Rheumatoid factor  -     C-reactive protein  -     JEFF    10. Vitamin D deficiency  -     Vitamin D 25  Hydroxy    11. Abnormal white blood cell (WBC) count  -     CBC (No Diff)    12. Grief  -     Ambulatory Referral to Psychiatry        Follow Up   No follow-ups on file.  Patient was given instructions and counseling regarding her condition or for health maintenance advice. Please see specific information pulled into the AVS if appropriate.     Pt reports would like to get back on her meds (effexor) and then see if she feels better--and then if persists will let me know and then we cn do the referral --

## 2021-06-24 ENCOUNTER — HOSPITAL ENCOUNTER (OUTPATIENT)
Dept: CT IMAGING | Facility: HOSPITAL | Age: 51
End: 2021-06-24

## 2021-06-28 ENCOUNTER — HOSPITAL ENCOUNTER (OUTPATIENT)
Dept: CT IMAGING | Facility: HOSPITAL | Age: 51
End: 2021-06-28

## 2021-07-06 ENCOUNTER — LAB (OUTPATIENT)
Dept: LAB | Facility: HOSPITAL | Age: 51
End: 2021-07-06

## 2021-07-06 DIAGNOSIS — R79.89 ELEVATED FERRITIN LEVEL: ICD-10-CM

## 2021-07-06 DIAGNOSIS — R63.4 WEIGHT LOSS: ICD-10-CM

## 2021-07-06 LAB
25(OH)D3 SERPL-MCNC: 37.2 NG/ML (ref 30–100)
ALBUMIN SERPL-MCNC: 3.9 G/DL (ref 3.5–5.2)
ALBUMIN/GLOB SERPL: 1.3 G/DL
ALP SERPL-CCNC: 89 U/L (ref 39–117)
ALT SERPL W P-5'-P-CCNC: 11 U/L (ref 1–33)
ANION GAP SERPL CALCULATED.3IONS-SCNC: 9.2 MMOL/L (ref 5–15)
AST SERPL-CCNC: 21 U/L (ref 1–32)
BASOPHILS # BLD AUTO: 0.06 10*3/MM3 (ref 0–0.2)
BASOPHILS NFR BLD AUTO: 0.8 % (ref 0–1.5)
BILIRUB SERPL-MCNC: 0.2 MG/DL (ref 0–1.2)
BUN SERPL-MCNC: 4 MG/DL (ref 6–20)
BUN/CREAT SERPL: 4.3 (ref 7–25)
CALCIUM SPEC-SCNC: 8.9 MG/DL (ref 8.6–10.5)
CHLORIDE SERPL-SCNC: 96 MMOL/L (ref 98–107)
CHROMATIN AB SERPL-ACNC: <10 IU/ML (ref 0–14)
CO2 SERPL-SCNC: 33.8 MMOL/L (ref 22–29)
CREAT SERPL-MCNC: 0.92 MG/DL (ref 0.57–1)
CRP SERPL-MCNC: <0.3 MG/DL (ref 0–0.5)
DEPRECATED RDW RBC AUTO: 45.5 FL (ref 37–54)
EOSINOPHIL # BLD AUTO: 0.24 10*3/MM3 (ref 0–0.4)
EOSINOPHIL NFR BLD AUTO: 3 % (ref 0.3–6.2)
ERYTHROCYTE [DISTWIDTH] IN BLOOD BY AUTOMATED COUNT: 12.8 % (ref 12.3–15.4)
FERRITIN SERPL-MCNC: 109.4 NG/ML (ref 13–150)
FOLATE SERPL-MCNC: 3.58 NG/ML (ref 4.78–24.2)
GFR SERPL CREATININE-BSD FRML MDRD: 64 ML/MIN/1.73
GLOBULIN UR ELPH-MCNC: 3 GM/DL
GLUCOSE SERPL-MCNC: 104 MG/DL (ref 65–99)
HCT VFR BLD AUTO: 39.2 % (ref 34–46.6)
HGB BLD-MCNC: 13.4 G/DL (ref 12–15.9)
IMM GRANULOCYTES # BLD AUTO: 0.03 10*3/MM3 (ref 0–0.05)
IMM GRANULOCYTES NFR BLD AUTO: 0.4 % (ref 0–0.5)
IRON 24H UR-MRATE: 38 MCG/DL (ref 37–145)
IRON SATN MFR SERPL: 13 % (ref 20–50)
LDH SERPL-CCNC: 184 U/L (ref 135–214)
LYMPHOCYTES # BLD AUTO: 2.59 10*3/MM3 (ref 0.7–3.1)
LYMPHOCYTES NFR BLD AUTO: 32.7 % (ref 19.6–45.3)
MCH RBC QN AUTO: 32.8 PG (ref 26.6–33)
MCHC RBC AUTO-ENTMCNC: 34.2 G/DL (ref 31.5–35.7)
MCV RBC AUTO: 96.1 FL (ref 79–97)
MONOCYTES # BLD AUTO: 0.5 10*3/MM3 (ref 0.1–0.9)
MONOCYTES NFR BLD AUTO: 6.3 % (ref 5–12)
NEUTROPHILS NFR BLD AUTO: 4.5 10*3/MM3 (ref 1.7–7)
NEUTROPHILS NFR BLD AUTO: 56.8 % (ref 42.7–76)
NRBC BLD AUTO-RTO: 0 /100 WBC (ref 0–0.2)
PLATELET # BLD AUTO: 239 10*3/MM3 (ref 140–450)
PMV BLD AUTO: 10.5 FL (ref 6–12)
POTASSIUM SERPL-SCNC: 3.3 MMOL/L (ref 3.5–5.2)
PROT SERPL-MCNC: 6.9 G/DL (ref 6–8.5)
RBC # BLD AUTO: 4.08 10*6/MM3 (ref 3.77–5.28)
SODIUM SERPL-SCNC: 139 MMOL/L (ref 136–145)
TIBC SERPL-MCNC: 295 MCG/DL (ref 298–536)
TRANSFERRIN SERPL-MCNC: 198 MG/DL (ref 200–360)
URATE SERPL-MCNC: 5.5 MG/DL (ref 2.4–5.7)
VIT B12 BLD-MCNC: 678 PG/ML (ref 211–946)
WBC # BLD AUTO: 7.92 10*3/MM3 (ref 3.4–10.8)

## 2021-07-06 PROCEDURE — 82746 ASSAY OF FOLIC ACID SERUM: CPT

## 2021-07-06 PROCEDURE — 86063 ANTISTREPTOLYSIN O SCREEN: CPT | Performed by: NURSE PRACTITIONER

## 2021-07-06 PROCEDURE — 85025 COMPLETE CBC W/AUTO DIFF WBC: CPT

## 2021-07-06 PROCEDURE — 84550 ASSAY OF BLOOD/URIC ACID: CPT | Performed by: NURSE PRACTITIONER

## 2021-07-06 PROCEDURE — 36415 COLL VENOUS BLD VENIPUNCTURE: CPT | Performed by: NURSE PRACTITIONER

## 2021-07-06 PROCEDURE — 80053 COMPREHEN METABOLIC PANEL: CPT

## 2021-07-06 PROCEDURE — 86225 DNA ANTIBODY NATIVE: CPT | Performed by: NURSE PRACTITIONER

## 2021-07-06 PROCEDURE — 86038 ANTINUCLEAR ANTIBODIES: CPT | Performed by: NURSE PRACTITIONER

## 2021-07-06 PROCEDURE — 83540 ASSAY OF IRON: CPT

## 2021-07-06 PROCEDURE — 82607 VITAMIN B-12: CPT

## 2021-07-06 PROCEDURE — 82306 VITAMIN D 25 HYDROXY: CPT | Performed by: NURSE PRACTITIONER

## 2021-07-06 PROCEDURE — 83615 LACTATE (LD) (LDH) ENZYME: CPT

## 2021-07-06 PROCEDURE — 86431 RHEUMATOID FACTOR QUANT: CPT | Performed by: NURSE PRACTITIONER

## 2021-07-06 PROCEDURE — 82728 ASSAY OF FERRITIN: CPT

## 2021-07-06 PROCEDURE — 86140 C-REACTIVE PROTEIN: CPT | Performed by: NURSE PRACTITIONER

## 2021-07-06 PROCEDURE — 84466 ASSAY OF TRANSFERRIN: CPT

## 2021-07-07 DIAGNOSIS — E53.8 FOLIC ACID DEFICIENCY: Primary | ICD-10-CM

## 2021-07-07 RX ORDER — FOLIC ACID 1 MG/1
1 TABLET ORAL DAILY
Qty: 30 TABLET | Refills: 5 | Status: SHIPPED | OUTPATIENT
Start: 2021-07-07 | End: 2022-03-15 | Stop reason: SDUPTHER

## 2021-07-07 NOTE — PROGRESS NOTES
Please mail letter to patient stating:    Holly, I have now received the blood counts they were completely normal with a normal white blood cell red blood cell hemoglobin hematocrit and platelet counts

## 2021-07-08 ENCOUNTER — APPOINTMENT (OUTPATIENT)
Dept: ONCOLOGY | Facility: HOSPITAL | Age: 51
End: 2021-07-08

## 2021-07-08 LAB — ASO AB SERPL-ACNC: NEGATIVE [IU]/ML

## 2021-07-09 LAB
DSDNA IGG SERPL IA-ACNC: NEGATIVE [IU]/ML
NUCLEAR IGG SER IA-RTO: NEGATIVE

## 2021-10-29 ENCOUNTER — TELEPHONE (OUTPATIENT)
Dept: FAMILY MEDICINE CLINIC | Facility: CLINIC | Age: 51
End: 2021-10-29

## 2021-10-29 NOTE — TELEPHONE ENCOUNTER
Caller: Holly Morin    Relationship: Self    Best call back number: [phone]     What is the best time to reach you: ANY     Who are you requesting to speak with (clinical staff, provider,  specific staff member): CLINICAL STAFF    What was the call regarding: PATIENT CALLED STATING THAT SHE WAS TOLD MY DR OCHOA TO LET HER KNOW IF SHE THOUGHT THE DOSAGE INCREASE OF HER VENLAFAXINE WAS TOO MUCH. SHE SAYS THAT IT IS MAKING HER IRRITABLE, AND WOULD LIKE TO BE ADVISED ON WHETHER THE DOSE SHOULD BE DECREASED BACK TO 75 MG.    Do you require a callback: YES

## 2021-10-30 DIAGNOSIS — F33.9 EPISODE OF RECURRENT MAJOR DEPRESSIVE DISORDER, UNSPECIFIED DEPRESSION EPISODE SEVERITY (HCC): ICD-10-CM

## 2021-10-30 RX ORDER — VENLAFAXINE HYDROCHLORIDE 75 MG/1
75 TABLET, EXTENDED RELEASE ORAL
Qty: 30 EACH | Refills: 2 | Status: SHIPPED | OUTPATIENT
Start: 2021-10-30 | End: 2022-01-21

## 2021-12-09 ENCOUNTER — TRANSCRIBE ORDERS (OUTPATIENT)
Dept: ADMINISTRATIVE | Facility: HOSPITAL | Age: 51
End: 2021-12-09

## 2021-12-09 DIAGNOSIS — N28.1 RENAL CYST: Primary | ICD-10-CM

## 2021-12-21 ENCOUNTER — TELEPHONE (OUTPATIENT)
Dept: UROLOGY | Facility: CLINIC | Age: 51
End: 2021-12-21

## 2021-12-21 NOTE — TELEPHONE ENCOUNTER
Valeria Villagran called and said the patient is scheduled for CT abdomin/pelvis with and without on 12/22.  The order in [facility] is not signed by Dr. Aguilar.

## 2021-12-22 ENCOUNTER — APPOINTMENT (OUTPATIENT)
Dept: CT IMAGING | Facility: HOSPITAL | Age: 51
End: 2021-12-22

## 2022-01-05 ENCOUNTER — APPOINTMENT (OUTPATIENT)
Dept: CT IMAGING | Facility: HOSPITAL | Age: 52
End: 2022-01-05

## 2022-01-21 DIAGNOSIS — F33.9 EPISODE OF RECURRENT MAJOR DEPRESSIVE DISORDER, UNSPECIFIED DEPRESSION EPISODE SEVERITY: ICD-10-CM

## 2022-01-21 RX ORDER — VENLAFAXINE HYDROCHLORIDE 75 MG/1
CAPSULE, EXTENDED RELEASE ORAL
Qty: 30 CAPSULE | Refills: 0 | Status: SHIPPED | OUTPATIENT
Start: 2022-01-21 | End: 2022-03-01

## 2022-01-21 NOTE — TELEPHONE ENCOUNTER
Please remind pt she hasn't been seen in the office for this since June 2021 and normally we see pt Q 6 months -I am giving her a courtesy refill now

## 2022-02-28 DIAGNOSIS — F33.9 EPISODE OF RECURRENT MAJOR DEPRESSIVE DISORDER, UNSPECIFIED DEPRESSION EPISODE SEVERITY: ICD-10-CM

## 2022-02-28 RX ORDER — VENLAFAXINE HYDROCHLORIDE 75 MG/1
CAPSULE, EXTENDED RELEASE ORAL
Qty: 30 CAPSULE | Refills: 0 | OUTPATIENT
Start: 2022-02-28

## 2022-03-01 DIAGNOSIS — F33.9 EPISODE OF RECURRENT MAJOR DEPRESSIVE DISORDER, UNSPECIFIED DEPRESSION EPISODE SEVERITY: ICD-10-CM

## 2022-03-01 RX ORDER — VENLAFAXINE HYDROCHLORIDE 75 MG/1
CAPSULE, EXTENDED RELEASE ORAL
Qty: 14 CAPSULE | Refills: 0 | Status: SHIPPED | OUTPATIENT
Start: 2022-03-01 | End: 2022-03-15 | Stop reason: SINTOL

## 2022-03-01 NOTE — TELEPHONE ENCOUNTER
Called and spoke w/pt.  She is completely out of medicine.  She does have an apt w/you this coming Monday the 7th, so I told her you would probably send in a one week supply.

## 2022-03-15 ENCOUNTER — OFFICE VISIT (OUTPATIENT)
Dept: FAMILY MEDICINE CLINIC | Facility: CLINIC | Age: 52
End: 2022-03-15

## 2022-03-15 VITALS
BODY MASS INDEX: 25.23 KG/M2 | WEIGHT: 157 LBS | SYSTOLIC BLOOD PRESSURE: 130 MMHG | DIASTOLIC BLOOD PRESSURE: 76 MMHG | OXYGEN SATURATION: 97 % | HEIGHT: 66 IN | TEMPERATURE: 97.2 F | HEART RATE: 95 BPM

## 2022-03-15 DIAGNOSIS — R11.0 NAUSEA: ICD-10-CM

## 2022-03-15 DIAGNOSIS — F41.9 ANXIETY: Primary | ICD-10-CM

## 2022-03-15 DIAGNOSIS — R79.89 ELEVATED FERRITIN LEVEL: ICD-10-CM

## 2022-03-15 DIAGNOSIS — Z12.31 ENCOUNTER FOR SCREENING MAMMOGRAM FOR MALIGNANT NEOPLASM OF BREAST: ICD-10-CM

## 2022-03-15 DIAGNOSIS — K21.9 GASTROESOPHAGEAL REFLUX DISEASE WITHOUT ESOPHAGITIS: ICD-10-CM

## 2022-03-15 DIAGNOSIS — M53.86 SCIATICA OF LEFT SIDE ASSOCIATED WITH DISORDER OF LUMBAR SPINE: ICD-10-CM

## 2022-03-15 DIAGNOSIS — E53.8 FOLIC ACID DEFICIENCY: ICD-10-CM

## 2022-03-15 DIAGNOSIS — E55.9 VITAMIN D DEFICIENCY: ICD-10-CM

## 2022-03-15 LAB
25(OH)D3 SERPL-MCNC: 22.5 NG/ML (ref 30–100)
ALBUMIN SERPL-MCNC: 3.8 G/DL (ref 3.5–5.2)
ALBUMIN/GLOB SERPL: 1.3 G/DL
ALP SERPL-CCNC: 79 U/L (ref 39–117)
ALT SERPL W P-5'-P-CCNC: 11 U/L (ref 1–33)
ANION GAP SERPL CALCULATED.3IONS-SCNC: 10.6 MMOL/L (ref 5–15)
AST SERPL-CCNC: 16 U/L (ref 1–32)
BILIRUB SERPL-MCNC: 0.2 MG/DL (ref 0–1.2)
BUN SERPL-MCNC: 7 MG/DL (ref 6–20)
BUN/CREAT SERPL: 7.9 (ref 7–25)
CALCIUM SPEC-SCNC: 8.9 MG/DL (ref 8.6–10.5)
CHLORIDE SERPL-SCNC: 101 MMOL/L (ref 98–107)
CO2 SERPL-SCNC: 28.4 MMOL/L (ref 22–29)
CREAT SERPL-MCNC: 0.89 MG/DL (ref 0.57–1)
EGFRCR SERPLBLD CKD-EPI 2021: 78.1 ML/MIN/1.73
FERRITIN SERPL-MCNC: 122 NG/ML (ref 13–150)
FOLATE SERPL-MCNC: 4.39 NG/ML (ref 4.78–24.2)
GLOBULIN UR ELPH-MCNC: 3 GM/DL
GLUCOSE SERPL-MCNC: 77 MG/DL (ref 65–99)
POTASSIUM SERPL-SCNC: 3.4 MMOL/L (ref 3.5–5.2)
PROT SERPL-MCNC: 6.8 G/DL (ref 6–8.5)
SODIUM SERPL-SCNC: 140 MMOL/L (ref 136–145)
VIT B12 BLD-MCNC: 330 PG/ML (ref 211–946)

## 2022-03-15 PROCEDURE — 80053 COMPREHEN METABOLIC PANEL: CPT | Performed by: NURSE PRACTITIONER

## 2022-03-15 PROCEDURE — 82607 VITAMIN B-12: CPT | Performed by: NURSE PRACTITIONER

## 2022-03-15 PROCEDURE — 82306 VITAMIN D 25 HYDROXY: CPT | Performed by: NURSE PRACTITIONER

## 2022-03-15 PROCEDURE — 99214 OFFICE O/P EST MOD 30 MIN: CPT | Performed by: NURSE PRACTITIONER

## 2022-03-15 PROCEDURE — 82746 ASSAY OF FOLIC ACID SERUM: CPT | Performed by: NURSE PRACTITIONER

## 2022-03-15 PROCEDURE — 82728 ASSAY OF FERRITIN: CPT | Performed by: NURSE PRACTITIONER

## 2022-03-15 RX ORDER — BACLOFEN 20 MG/1
20 TABLET ORAL 2 TIMES DAILY PRN
Qty: 60 TABLET | Refills: 5 | Status: SHIPPED | OUTPATIENT
Start: 2022-03-15 | End: 2022-08-29 | Stop reason: SDUPTHER

## 2022-03-15 RX ORDER — ESCITALOPRAM OXALATE 10 MG/1
10 TABLET ORAL DAILY
Qty: 30 TABLET | Refills: 3 | Status: SHIPPED | OUTPATIENT
Start: 2022-03-15 | End: 2022-07-18 | Stop reason: SDUPTHER

## 2022-03-15 RX ORDER — FOLIC ACID 1 MG/1
1 TABLET ORAL DAILY
Qty: 30 TABLET | Refills: 5 | Status: SHIPPED | OUTPATIENT
Start: 2022-03-15 | End: 2022-08-29 | Stop reason: SDUPTHER

## 2022-03-15 RX ORDER — ONDANSETRON 4 MG/1
4 TABLET, ORALLY DISINTEGRATING ORAL EVERY 8 HOURS PRN
Qty: 30 TABLET | Refills: 5 | Status: SHIPPED | OUTPATIENT
Start: 2022-03-15 | End: 2022-08-29 | Stop reason: SDUPTHER

## 2022-03-15 NOTE — PROGRESS NOTES
Venipuncture Blood Specimen Collection  Venipuncture performed in left arm by Verónica Newman with good hemostasis. Patient tolerated the procedure well without complications.   03/15/22   Verónica Newman

## 2022-03-15 NOTE — PROGRESS NOTES
Chief Complaint  Med Refill (Also wants to ask you about changing her medicine to lexapro)    Subjective            Holly Morin presents to [facility]  --Pt here for the med refills--but would like to try switching back to Lexapro--was on this in the past--and we switched her to Effexor as she was dealing with added stress of losing her father--then now at a different place in her life and reports doing better and trying to utilize yoga and meditation--and seems to be helping--    --Also pt reports ever since being on the Effexor she's had stomach issues and would like to try switching back to Lexapro as this did not cause the stomach issues -used to be on 150 to 225 mg daily in the past and now weaned down to 75 mg daily     --Pt was F/U with hematology and now on PRN basis if the ferritin elevates again--and was Dx with Hemochromotosis--and would like the ferritin level checked--    --GERD: Stable on the current medication regimen no side effects no issues still does have some breakthrough nausea uses the Zofran as needed    --Vit D Def: Prior history of this and would like to have this level checked as she still has some fatigue    --Folic acid deficiency: Patient remains on the folic acid 1 mg daily and would like to have the level rechecked today as she reports she still has some fatigue    --Sciatica left-sided with the prior chronic low back pain: Patient uses baclofen as needed for this when it flares up reports that it is flared up right now           PHQ-2 Total Score: 5  PHQ-9 Total Score: 5    Past Medical History:   Diagnosis Date   • Anemia    • Arthritis of knee    • Carpal tunnel syndrome    • Constipation    • DDD (degenerative disc disease), lumbar    • Depression    • Fatigue    • GERD (gastroesophageal reflux disease)    • Heartburn    • Hiatal hernia    • High serum ferritin     DR HOWARD   • History of anesthesia reaction     SLOW TO WAKE   • Hx of common  duodenal ulcer    • Knee pain, right     PAIN AND SWELLING   • LUQ pain    • Nausea    • Rectal bleeding    • Social anxiety disorder    • Wears glasses    • Weight loss        Allergies   Allergen Reactions   • Sulfa Antibiotics Swelling   • Penicillins Rash        Past Surgical History:   Procedure Laterality Date   • BLADDER SURGERY      BLADDER LIFT   • HYSTERECTOMY     • KNEE SURGERY Right     SX   • LAPAROSCOPIC CHOLECYSTECTOMY     • TEETH EXTRACTION     • X-STOP IMPLANTATION          Social History     Tobacco Use   • Smoking status: Current Every Day Smoker     Packs/day: 1.00     Last attempt to quit: 1/15/2018     Years since quittin.1   • Smokeless tobacco: Never Used   • Tobacco comment: 1 PPD SMOKED FOR 10 YEARS   Vaping Use   • Vaping Use: Never used       Family History   Problem Relation Age of Onset   • Pancreatic cancer Father    • Lung cancer Paternal Grandfather         Health Maintenance Due   Topic Date Due   • ANNUAL PHYSICAL  Never done   • MAMMOGRAM  03/10/2022        Current Outpatient Medications on File Prior to Visit   Medication Sig   • albuterol sulfate  (90 Base) MCG/ACT inhaler Ventolin HFA 90 mcg/actuation inhalation HFA aerosol inhaler inhale 1 puff (90 mcg) by inhalation route every 6 hours as needed   Active   • Cholecalciferol 50 MCG (2000 UT) capsule Vitamin D3 50 mcg (2,000 unit) capsule   TAKE 1 CAPSULE BY MOUTH EVERY DAY   • ergocalciferol (ERGOCALCIFEROL) 1.25 MG (87970 UT) capsule Take 50,000 Units by mouth 1 (One) Time Per Week.   • selenium sulfide (SELSUN) 2.5 % lotion selenium sulfide 2.5 % lotion   APPLY TO THE AFFECTED AREA(S), lather, leave in place for 10 minutes AND then rinse off with water once daily for 7 days   • traZODone (DESYREL) 50 MG tablet Take 1 tablet by mouth Every Night.   • vitamin B-12 (CYANOCOBALAMIN) 1000 MCG tablet Take 1,000 mcg by mouth Daily.   • [DISCONTINUED] baclofen (LIORESAL) 20 MG tablet Take 1 tablet by mouth 3 (Three) Times  "a Day.   • [DISCONTINUED] cimetidine (TAGAMET) 200 MG tablet cimetidine 200 mg tablet   TAKE 1 TABLET BY MOUTH TWICE DAILY - 30 MINUTES BEFORE A MEAL   • [DISCONTINUED] folic acid (FOLVITE) 1 MG tablet Take 1 tablet by mouth Daily.   • [DISCONTINUED] ondansetron ODT (ZOFRAN-ODT) 8 MG disintegrating tablet ondansetron 8 mg disintegrating tablet   DISSOLVE 1 TABLET in mouth THREE TIMES DAILY AS NEEDED FOR NAUSEA   • [DISCONTINUED] Omeprazole 20 MG Tablet Delayed Release Dispersible Take 20 mg by mouth Daily.   • [DISCONTINUED] venlafaxine XR (EFFEXOR-XR) 75 MG 24 hr capsule TAKE 1 CAPSULE BY MOUTH once DAILY with breakfast **MUST MAKE APPOINTMENT FOR FUTURE REFILLS**     No current facility-administered medications on file prior to visit.       Immunization History   Administered Date(s) Administered   • Flu Vaccine Intradermal Quad 18-64YR 10/03/2014, 10/19/2017   • Flu Vaccine Split Quad 10/15/2015, 10/13/2016, 09/01/2017, 10/29/2018, 11/19/2019   • Tdap 08/30/2016       Review of Systems   Constitutional: Positive for fatigue.   Respiratory: Negative for shortness of breath.    Cardiovascular: Negative for chest pain.   Gastrointestinal: Positive for vomiting.        Everything she eats will not stay down    Genitourinary: Negative for dysuria.   Musculoskeletal: Positive for arthralgias and back pain.        Knees and LBP with sciatica --flared up    Neurological: Negative for dizziness, syncope and light-headedness.   Psychiatric/Behavioral: Positive for depressed mood. Negative for self-injury and suicidal ideas. The patient is nervous/anxious.         Objective     /76 (BP Location: Left arm, Patient Position: Sitting, Cuff Size: Adult)   Pulse 95   Temp 97.2 °F (36.2 °C) (Temporal)   Ht 166.4 cm (65.5\")   Wt 71.2 kg (157 lb)   SpO2 97%   BMI 25.73 kg/m²       Physical Exam  Vitals and nursing note reviewed.   Constitutional:       Appearance: Normal appearance.   HENT:      Head: Normocephalic.     "  Nose: Nose normal.   Eyes:      Pupils: Pupils are equal, round, and reactive to light.   Cardiovascular:      Rate and Rhythm: Normal rate and regular rhythm.      Heart sounds: Normal heart sounds.   Pulmonary:      Effort: Pulmonary effort is normal.      Breath sounds: Normal breath sounds.   Abdominal:      Palpations: Abdomen is soft.   Musculoskeletal:         General: Normal range of motion.      Cervical back: Normal range of motion and neck supple.   Skin:     General: Skin is warm and dry.   Neurological:      Mental Status: She is alert and oriented to person, place, and time.   Psychiatric:         Mood and Affect: Mood normal.         Behavior: Behavior normal.         Thought Content: Thought content normal.         Judgment: Judgment normal.         Result Review :             MRI Lumbar Spine Without Contrast (04/17/2019 16:43)  Uric acid (07/06/2021 12:17)  Antistreptolysin O screen (07/06/2021 12:17)  Rheumatoid factor (07/06/2021 12:17)  C-reactive protein (07/06/2021 12:17)  JEFF (07/06/2021 12:17)  Vitamin D 25 Hydroxy (07/06/2021 12:17)  Comprehensive Metabolic Panel (07/06/2021 12:17)  Iron Profile (07/06/2021 12:17)  Ferritin (07/06/2021 12:17)  CBC & Differential (07/06/2021 12:17)  Lactate Dehydrogenase (07/06/2021 12:17)  Folate (07/06/2021 12:17)  Vitamin B12 (07/06/2021 12:17)               Assessment and Plan      Diagnoses and all orders for this visit:    1. Anxiety (Primary)  -     escitalopram (Lexapro) 10 MG tablet; Take 1 tablet by mouth Daily.  Dispense: 30 tablet; Refill: 3  -     Comprehensive Metabolic Panel    2. Vitamin D deficiency  -     Vitamin D 25 Hydroxy    3. Folic acid deficiency  -     folic acid (FOLVITE) 1 MG tablet; Take 1 tablet by mouth Daily.  Dispense: 30 tablet; Refill: 5    4. Gastroesophageal reflux disease without esophagitis  -     cimetidine (TAGAMET) 200 MG tablet; Take 1 tablet by mouth Daily.  Dispense: 30 tablet; Refill: 5  -     Comprehensive  Metabolic Panel    5. Nausea  -     ondansetron ODT (ZOFRAN-ODT) 4 MG disintegrating tablet; Place 1 tablet on the tongue Every 8 (Eight) Hours As Needed for Nausea or Vomiting.  Dispense: 30 tablet; Refill: 5  -     Comprehensive Metabolic Panel    6. Encounter for screening mammogram for malignant neoplasm of breast  -     Mammo Screening Bilateral With CAD    7. Sciatica of left side associated with disorder of lumbar spine  -     baclofen (LIORESAL) 20 MG tablet; Take 1 tablet by mouth 2 (Two) Times a Day As Needed for Muscle Spasms.  Dispense: 60 tablet; Refill: 5  -     Vitamin B12 & Folate    8. Elevated ferritin level  -     Ferritin    declines any immunizations and does not want the hepatitis C drug        Follow Up     Return in about 3 months (around 6/15/2022), or if symptoms worsen or fail to improve, for Recheck on the medication change.

## 2022-03-17 DIAGNOSIS — E55.9 VITAMIN D DEFICIENCY: Primary | ICD-10-CM

## 2022-03-17 RX ORDER — ERGOCALCIFEROL 1.25 MG/1
50000 CAPSULE ORAL WEEKLY
Qty: 5 CAPSULE | Refills: 2 | Status: SHIPPED | OUTPATIENT
Start: 2022-03-17

## 2022-03-17 NOTE — PROGRESS NOTES
Please mail letter to patient latoya Barrera, the comprehensive panel shows normal glucose, normal kidney and liver function tests, normal electrolytes except the potassium was just borderline low at 3.4 and it should be 3.5-5.2; your ferritin levels were normal range; your vitamin B12 was normal range but the folic acid was slightly borderline low; so please continue taking your daily folic acid; your vitamin D level was low again at 22.5 and it should be  so I will send in the once weekly high-dose vitamin D for you to take for approximately 3 months and then once you have completed that please start taking the over-the-counter vitamin D3 1000 IUs daily thereafter once you have completed the once weekly high-dose for 3 months and then whenever you follow-up we will just recheck your levels

## 2022-03-18 ENCOUNTER — APPOINTMENT (OUTPATIENT)
Dept: MAMMOGRAPHY | Facility: HOSPITAL | Age: 52
End: 2022-03-18

## 2022-03-28 ENCOUNTER — APPOINTMENT (OUTPATIENT)
Dept: MAMMOGRAPHY | Facility: HOSPITAL | Age: 52
End: 2022-03-28

## 2022-04-01 ENCOUNTER — TELEPHONE (OUTPATIENT)
Dept: UROLOGY | Facility: CLINIC | Age: 52
End: 2022-04-01

## 2022-04-01 NOTE — TELEPHONE ENCOUNTER
----- Message from Shandra Zazueta sent at 4/1/2022 11:11 AM EDT -----  Regarding: NEEDS CX OR R/S  PT WAS SUPPOSED TO HAVE A CT DONE PRIOR TO HER APPT. SHE HAS CX OR NO SHOWED 3 TIMES FOR IMAGING. SHE NEEDS TO EITHER BE CX OR R/S FOR AN OFFICE VISIT AFTER SHE HAS HER IMAGING DONE. THANK YOU!!

## 2022-04-01 NOTE — TELEPHONE ENCOUNTER
Tried to call patient back x2 but no voicemail was available. The phone just kept ringing. Called to notifiy patient of rescheduled CT scan for April 23, 2022 arrival time 2:15 pm at [facility] If patient calls back please notify her of this.

## 2022-04-01 NOTE — TELEPHONE ENCOUNTER
CALLED PT ABOUT APPT ON 4/5/22/PT SAID SHE DID NOT HAVE CT DONE AND NO ONE EVER CALLED HER BACK TO RS IT/EXPLAINED TO PT THAT I AM CX APPT ON 4/5/22 W/JIGAR AND ONCE CT IS RS SHE CAN CALL US TO RS F/U APPT/PLEASE MAKE SURE SOMEONE CALLS PT TO RS CT

## 2022-04-08 NOTE — TELEPHONE ENCOUNTER
Patient has attempted to be called x4 with no answer, will send certified letter. Will cancel imaging until patient calls to r/s

## 2022-04-11 ENCOUNTER — APPOINTMENT (OUTPATIENT)
Dept: MAMMOGRAPHY | Facility: HOSPITAL | Age: 52
End: 2022-04-11

## 2022-04-25 ENCOUNTER — APPOINTMENT (OUTPATIENT)
Dept: CT IMAGING | Facility: HOSPITAL | Age: 52
End: 2022-04-25

## 2022-07-13 DIAGNOSIS — F41.9 ANXIETY: ICD-10-CM

## 2022-07-13 RX ORDER — ESCITALOPRAM OXALATE 10 MG/1
TABLET ORAL
Qty: 30 TABLET | Refills: 3 | OUTPATIENT
Start: 2022-07-13

## 2022-07-18 ENCOUNTER — TELEPHONE (OUTPATIENT)
Dept: FAMILY MEDICINE CLINIC | Facility: CLINIC | Age: 52
End: 2022-07-18

## 2022-07-18 DIAGNOSIS — F41.9 ANXIETY: ICD-10-CM

## 2022-07-18 RX ORDER — ESCITALOPRAM OXALATE 10 MG/1
10 TABLET ORAL DAILY
Qty: 30 TABLET | Refills: 3 | Status: CANCELLED | OUTPATIENT
Start: 2022-07-18

## 2022-07-18 RX ORDER — ESCITALOPRAM OXALATE 10 MG/1
10 TABLET ORAL DAILY
Qty: 10 TABLET | Refills: 0 | Status: SHIPPED | OUTPATIENT
Start: 2022-07-18 | End: 2022-07-26 | Stop reason: SDUPTHER

## 2022-07-18 NOTE — TELEPHONE ENCOUNTER
Caller: Holly Morin    Relationship: Self    Best call back number: [phone]    Requested Prescriptions:   Requested Prescriptions     Pending Prescriptions Disp Refills   • escitalopram (Lexapro) 10 MG tablet 30 tablet 3     Sig: Take 1 tablet by mouth Daily.        Pharmacy where request should be sent: [facility] [address] [address] - [phone] [facility] [phone] [facility]     Additional details provided by patient: PATIENT HAS APPOINTMENT 07.26.2022 BUT NEEDS A SMALL REFILL TO HOLD HER UNTIL THAT APPOINTMENT     Does the patient have less than a 3 day supply:  [x] Yes  [] No    Raya Pereira Rep   07/18/22 14:40 EDT

## 2022-07-26 ENCOUNTER — TELEPHONE (OUTPATIENT)
Dept: FAMILY MEDICINE CLINIC | Facility: CLINIC | Age: 52
End: 2022-07-26

## 2022-07-26 DIAGNOSIS — F41.9 ANXIETY: ICD-10-CM

## 2022-07-26 RX ORDER — ESCITALOPRAM OXALATE 10 MG/1
10 TABLET ORAL DAILY
Qty: 14 TABLET | Refills: 0 | Status: SHIPPED | OUTPATIENT
Start: 2022-07-26 | End: 2022-08-19 | Stop reason: SDUPTHER

## 2022-07-26 NOTE — TELEPHONE ENCOUNTER
Has appointment today for refills but called and states she is sick and did not want to come and get anyone else sick. She also denies having a cell phone or tablet for a video visit. I told her it looks like we have already courtesy refilled for her, and that I would ask you what you would like to do, I told her that we likely need to see her either way, but that we would keep her appointment scheduled until speaking with you first. Please advise.

## 2022-08-19 DIAGNOSIS — F41.9 ANXIETY: ICD-10-CM

## 2022-08-19 RX ORDER — ESCITALOPRAM OXALATE 10 MG/1
10 TABLET ORAL DAILY
Qty: 14 TABLET | Refills: 0 | Status: SHIPPED | OUTPATIENT
Start: 2022-08-19 | End: 2022-08-29 | Stop reason: SINTOL

## 2022-08-19 NOTE — TELEPHONE ENCOUNTER
Caller: Holly Morin    Relationship: Self    Best call back number: [phone]    Requested Prescriptions:   Requested Prescriptions     Pending Prescriptions Disp Refills   • escitalopram (Lexapro) 10 MG tablet 14 tablet 0     Sig: Take 1 tablet by mouth Daily.        [facility] [address] [address] - [phone] [facility] [phone] FX     Does the patient have less than a 3 day supply:  [x] Yes  [] No    Raya Doan Rep   08/19/22 12:47 EDT

## 2022-08-29 ENCOUNTER — OFFICE VISIT (OUTPATIENT)
Dept: FAMILY MEDICINE CLINIC | Facility: CLINIC | Age: 52
End: 2022-08-29

## 2022-08-29 VITALS
BODY MASS INDEX: 25.23 KG/M2 | SYSTOLIC BLOOD PRESSURE: 128 MMHG | DIASTOLIC BLOOD PRESSURE: 76 MMHG | TEMPERATURE: 98.2 F | WEIGHT: 157 LBS | HEIGHT: 66 IN | HEART RATE: 88 BPM | OXYGEN SATURATION: 97 %

## 2022-08-29 DIAGNOSIS — Z87.19 HISTORY OF PANCREATIC DISORDER: ICD-10-CM

## 2022-08-29 DIAGNOSIS — M25.562 CHRONIC PAIN OF BOTH KNEES: ICD-10-CM

## 2022-08-29 DIAGNOSIS — K58.1 IRRITABLE BOWEL SYNDROME WITH CONSTIPATION: ICD-10-CM

## 2022-08-29 DIAGNOSIS — K21.9 GASTROESOPHAGEAL REFLUX DISEASE WITHOUT ESOPHAGITIS: ICD-10-CM

## 2022-08-29 DIAGNOSIS — Z87.19 HISTORY OF GASTRITIS: ICD-10-CM

## 2022-08-29 DIAGNOSIS — M53.86 SCIATICA OF LEFT SIDE ASSOCIATED WITH DISORDER OF LUMBAR SPINE: ICD-10-CM

## 2022-08-29 DIAGNOSIS — E53.8 FOLIC ACID DEFICIENCY: ICD-10-CM

## 2022-08-29 DIAGNOSIS — M25.561 CHRONIC PAIN OF BOTH KNEES: ICD-10-CM

## 2022-08-29 DIAGNOSIS — K92.1 BLOOD IN STOOL: ICD-10-CM

## 2022-08-29 DIAGNOSIS — R11.2 NAUSEA AND VOMITING, UNSPECIFIED VOMITING TYPE: ICD-10-CM

## 2022-08-29 DIAGNOSIS — F33.9 EPISODE OF RECURRENT MAJOR DEPRESSIVE DISORDER, UNSPECIFIED DEPRESSION EPISODE SEVERITY: Primary | ICD-10-CM

## 2022-08-29 DIAGNOSIS — R10.12 LUQ PAIN: ICD-10-CM

## 2022-08-29 DIAGNOSIS — F41.9 ANXIETY: ICD-10-CM

## 2022-08-29 DIAGNOSIS — G89.29 CHRONIC PAIN OF BOTH KNEES: ICD-10-CM

## 2022-08-29 PROCEDURE — 99214 OFFICE O/P EST MOD 30 MIN: CPT | Performed by: NURSE PRACTITIONER

## 2022-08-29 RX ORDER — BACLOFEN 20 MG/1
20 TABLET ORAL 2 TIMES DAILY PRN
Qty: 60 TABLET | Refills: 5 | Status: SHIPPED | OUTPATIENT
Start: 2022-08-29

## 2022-08-29 RX ORDER — ONDANSETRON 4 MG/1
4 TABLET, ORALLY DISINTEGRATING ORAL EVERY 8 HOURS PRN
Qty: 30 TABLET | Refills: 5 | Status: SHIPPED | OUTPATIENT
Start: 2022-08-29 | End: 2022-11-22 | Stop reason: SDUPTHER

## 2022-08-29 RX ORDER — DULOXETIN HYDROCHLORIDE 30 MG/1
30 CAPSULE, DELAYED RELEASE ORAL DAILY
Qty: 30 CAPSULE | Refills: 1 | Status: SHIPPED | OUTPATIENT
Start: 2022-08-29 | End: 2022-10-21

## 2022-08-29 RX ORDER — FOLIC ACID 1 MG/1
1 TABLET ORAL DAILY
Qty: 30 TABLET | Refills: 5 | Status: SHIPPED | OUTPATIENT
Start: 2022-08-29

## 2022-08-29 NOTE — PROGRESS NOTES
Answers for HPI/ROS submitted by the patient on 8/27/2022  Please describe your symptoms.: Pain in both knees, upper left abdomen pain and blood in stool, lower abdomen discomfort, and refills. And a nervous tick I believe is from the Lexapro  Have you had these symptoms before?: Yes  How long have you been having these symptoms?: Greater than 2 weeks  What is the primary reason for your visit?: Other    Chief Complaint  Follow-up (Patient wants to discuss the lexpro and she wanted to ask about getting some Voltaren for her knees. )    Subjective            Holly Morin presents to [facility]  1) Patient's appointment was scheduled for gastroenterology referral-for the concern for pain in the left upper abdomen and blood in the stool and lower abdominal discomfort-used to see gastroenterology in the past for pancreatic issues AASF Britt     2) Then the patient is here today reporting pain in bilateral knees also was actually wanting to start voltaren gel  for this    3) And then also was concerned about a little nervous tic that she has developed and also very irritable on this--and she wondered if it had to do with the Lexapro 10 mg daily--tried the following medications in the past as well: Trazodone, Effexor, BuSpar-with the most recent being the Lexapro--and pt reports would likje to trial the cymbalta--no SI/HI     4) And then also reports that she is needing medication refills on her chronic comorbid conditions as well:    --          PHQ-2 Total Score: 10  PHQ-9 Total Score: 10    Past Medical History:   Diagnosis Date   • Anemia    • Arthritis of knee    • Carpal tunnel syndrome    • Constipation    • DDD (degenerative disc disease), lumbar    • Depression    • Fatigue    • GERD (gastroesophageal reflux disease)    • Heartburn    • Hiatal hernia    • High serum ferritin     DR HOWARD   • History of anesthesia reaction     SLOW TO WAKE   • Hx of common duodenal ulcer    •  Knee pain, right     PAIN AND SWELLING   • LUQ pain    • Nausea    • Rectal bleeding    • Social anxiety disorder    • Wears glasses    • Weight loss        Allergies   Allergen Reactions   • Sulfa Antibiotics Swelling   • Penicillins Rash        Past Surgical History:   Procedure Laterality Date   • BLADDER SURGERY      BLADDER LIFT   • HYSTERECTOMY     • KNEE SURGERY Right     SX   • LAPAROSCOPIC CHOLECYSTECTOMY     • TEETH EXTRACTION     • X-STOP IMPLANTATION          Social History     Tobacco Use   • Smoking status: Current Every Day Smoker     Packs/day: 1.00     Last attempt to quit: 1/15/2018     Years since quittin.6   • Smokeless tobacco: Never Used   • Tobacco comment: 1 PPD SMOKED FOR 10 YEARS   Vaping Use   • Vaping Use: Never used       Family History   Problem Relation Age of Onset   • Pancreatic cancer Father    • Lung cancer Paternal Grandfather         Health Maintenance Due   Topic Date Due   • ANNUAL PHYSICAL  Never done   • MAMMOGRAM  03/10/2022        Current Outpatient Medications on File Prior to Visit   Medication Sig   • albuterol sulfate  (90 Base) MCG/ACT inhaler Ventolin HFA 90 mcg/actuation inhalation HFA aerosol inhaler inhale 1 puff (90 mcg) by inhalation route every 6 hours as needed   Active   • Cholecalciferol 50 MCG (2000 UT) capsule Vitamin D3 50 mcg (2,000 unit) capsule   TAKE 1 CAPSULE BY MOUTH EVERY DAY   • ergocalciferol (ERGOCALCIFEROL) 1.25 MG (64518 UT) capsule Take 1 capsule by mouth 1 (One) Time Per Week.   • selenium sulfide (SELSUN) 2.5 % lotion selenium sulfide 2.5 % lotion   APPLY TO THE AFFECTED AREA(S), lather, leave in place for 10 minutes AND then rinse off with water once daily for 7 days   • vitamin B-12 (CYANOCOBALAMIN) 1000 MCG tablet Take 1,000 mcg by mouth Daily.   • [DISCONTINUED] baclofen (LIORESAL) 20 MG tablet Take 1 tablet by mouth 2 (Two) Times a Day As Needed for Muscle Spasms.   • [DISCONTINUED] cimetidine (TAGAMET) 200 MG tablet Take 1  "tablet by mouth Daily.   • [DISCONTINUED] folic acid (FOLVITE) 1 MG tablet Take 1 tablet by mouth Daily.   • [DISCONTINUED] ondansetron ODT (ZOFRAN-ODT) 4 MG disintegrating tablet Place 1 tablet on the tongue Every 8 (Eight) Hours As Needed for Nausea or Vomiting.   • [DISCONTINUED] escitalopram (Lexapro) 10 MG tablet Take 1 tablet by mouth Daily.   • [DISCONTINUED] traZODone (DESYREL) 50 MG tablet Take 1 tablet by mouth Every Night.     No current facility-administered medications on file prior to visit.       Immunization History   Administered Date(s) Administered   • Flu Vaccine Intradermal Quad 18-64YR 10/03/2014, 10/19/2017   • Flu Vaccine Split Quad 10/15/2015, 10/13/2016, 09/01/2017, 10/29/2018, 11/19/2019   • Tdap 08/30/2016       Review of Systems   Constitutional: Positive for fatigue.   HENT: Negative for trouble swallowing.    Eyes: Negative for blurred vision and double vision.   Respiratory: Negative for choking and shortness of breath.    Cardiovascular: Negative for chest pain.   Gastrointestinal: Positive for abdominal pain, blood in stool, constipation, nausea, vomiting and GERD.   Genitourinary: Negative for dysuria.   Musculoskeletal: Positive for arthralgias and back pain.   Neurological: Negative for dizziness, seizures, syncope and light-headedness.   Psychiatric/Behavioral: Positive for depressed mood. Negative for self-injury and suicidal ideas. The patient is nervous/anxious.         Objective     /76 (BP Location: Right arm, Patient Position: Sitting, Cuff Size: Adult)   Pulse 88   Temp 98.2 °F (36.8 °C) (Temporal)   Ht 166.4 cm (65.5\")   Wt 71.2 kg (157 lb)   SpO2 97%   BMI 25.73 kg/m²       Physical Exam  Vitals and nursing note reviewed.   Constitutional:       Appearance: Normal appearance.   HENT:      Head: Normocephalic.      Right Ear: External ear normal.      Left Ear: External ear normal.      Nose: Nose normal.      Mouth/Throat:      Comments: Wearing mask  Eyes: "      Pupils: Pupils are equal, round, and reactive to light.   Cardiovascular:      Rate and Rhythm: Normal rate and regular rhythm.      Heart sounds: Normal heart sounds.   Pulmonary:      Effort: Pulmonary effort is normal.      Breath sounds: Normal breath sounds.   Abdominal:      General: Bowel sounds are normal.      Palpations: Abdomen is soft.      Tenderness: There is abdominal tenderness in the left upper quadrant.   Musculoskeletal:      Cervical back: Normal range of motion and neck supple.      Right knee: Crepitus present.      Left knee: Crepitus present.      Comments: Chronic back pain and muscle tension    Skin:     General: Skin is warm and dry.   Neurological:      Mental Status: She is alert and oriented to person, place, and time.   Psychiatric:         Mood and Affect: Mood normal.         Behavior: Behavior normal.         Thought Content: Thought content normal.         Judgment: Judgment normal.         Result Review :           Office Visit Converted with Penelope Britt APRN (11/23/2020)  ENDOSCOPY, INT (10/10/2018)  PROCEDURE NOTE - CONVERTED (07/16/2020 00:00)  Office Visit with Sandra Rodriguez APRN (06/08/2021)             Assessment and Plan      Diagnoses and all orders for this visit:    1. Episode of recurrent major depressive disorder, unspecified depression episode severity (HCC) (Primary)  -     DULoxetine (CYMBALTA) 30 MG capsule; Take 1 capsule by mouth Daily.  Dispense: 30 capsule; Refill: 1    2. Anxiety  -     DULoxetine (CYMBALTA) 30 MG capsule; Take 1 capsule by mouth Daily.  Dispense: 30 capsule; Refill: 1    3. Sciatica of left side associated with disorder of lumbar spine  -     baclofen (LIORESAL) 20 MG tablet; Take 1 tablet by mouth 2 (Two) Times a Day As Needed for Muscle Spasms.  Dispense: 60 tablet; Refill: 5    4. Gastroesophageal reflux disease without esophagitis  -     cimetidine (TAGAMET) 200 MG tablet; Take 1 tablet by mouth Daily.  Dispense:  30 tablet; Refill: 5  -     Ambulatory Referral to Gastroenterology    5. LUQ pain  -     Ambulatory Referral to Gastroenterology  -     CT Abdomen Pelvis Without Contrast; Future    6. Blood in stool  -     Ambulatory Referral to Gastroenterology  -     CT Abdomen Pelvis Without Contrast; Future    7. History of gastritis  -     Ambulatory Referral to Gastroenterology    8. Irritable bowel syndrome with constipation  -     Ambulatory Referral to Gastroenterology    9. History of pancreatic disorder  -     Ambulatory Referral to Gastroenterology  -     CT Abdomen Pelvis Without Contrast; Future    10. Chronic pain of both knees  -     Diclofenac Sodium (VOLTAREN) 1 % gel gel; Apply 4 g topically to the appropriate area as directed 4 (Four) Times a Day As Needed (bilat chronic knee pain).  Dispense: 100 g; Refill: 5    11. Folic acid deficiency  -     folic acid (FOLVITE) 1 MG tablet; Take 1 tablet by mouth Daily.  Dispense: 30 tablet; Refill: 5    12. Nausea and vomiting, unspecified vomiting type  Comments:  happens post-prandially   Orders:  -     Ambulatory Referral to Gastroenterology  -     ondansetron ODT (ZOFRAN-ODT) 4 MG disintegrating tablet; Place 1 tablet on the tongue Every 8 (Eight) Hours As Needed for Nausea or Vomiting.  Dispense: 30 tablet; Refill: 5  -     CT Abdomen Pelvis Without Contrast; Future    declines labs today and reports will do these at the 1 month F/U appoint         Follow Up     Return in about 4 weeks (around 9/26/2022), or if symptoms worsen or fail to improve, for Recheck.

## 2022-09-14 ENCOUNTER — HOSPITAL ENCOUNTER (OUTPATIENT)
Dept: CT IMAGING | Facility: HOSPITAL | Age: 52
Discharge: HOME | End: 2022-09-14
Admitting: NURSE PRACTITIONER

## 2022-09-14 DIAGNOSIS — R10.12 LUQ PAIN: ICD-10-CM

## 2022-09-14 DIAGNOSIS — K92.1 BLOOD IN STOOL: ICD-10-CM

## 2022-09-14 DIAGNOSIS — R11.2 NAUSEA AND VOMITING, UNSPECIFIED VOMITING TYPE: ICD-10-CM

## 2022-09-14 DIAGNOSIS — Z87.19 HISTORY OF PANCREATIC DISORDER: ICD-10-CM

## 2022-09-14 PROCEDURE — 74176 CT ABD & PELVIS W/O CONTRAST: CPT

## 2022-09-14 NOTE — PROGRESS NOTES
Please mail letter to patient stating    Holly, the CT of the abdomen and pelvis shows chronically irregularly thick-walled bladder which could reflect chronic cystitis and is unchanged since 2020 when the last CT scan was done--but we could get you in with a urologist for further evaluation please let me know if you would like to proceed with this referral    And then also it shows diverticulosis and no evidence of diverticulitis    And then also there was a right adnexal/ovarian cystic lesion that appears unchanged compared to the last study in 2020--if you are having pain in that right ovary area please let me know and we could proceed with a transvaginal ultrasound

## 2022-10-20 DIAGNOSIS — F41.9 ANXIETY: ICD-10-CM

## 2022-10-20 DIAGNOSIS — F33.9 EPISODE OF RECURRENT MAJOR DEPRESSIVE DISORDER, UNSPECIFIED DEPRESSION EPISODE SEVERITY: ICD-10-CM

## 2022-10-21 RX ORDER — DULOXETIN HYDROCHLORIDE 30 MG/1
30 CAPSULE, DELAYED RELEASE ORAL DAILY
Qty: 30 CAPSULE | Refills: 0 | Status: SHIPPED | OUTPATIENT
Start: 2022-10-21 | End: 2022-10-25 | Stop reason: SDUPTHER

## 2022-10-25 ENCOUNTER — TELEPHONE (OUTPATIENT)
Dept: FAMILY MEDICINE CLINIC | Facility: CLINIC | Age: 52
End: 2022-10-25

## 2022-10-25 DIAGNOSIS — F41.9 ANXIETY: ICD-10-CM

## 2022-10-25 DIAGNOSIS — F33.9 EPISODE OF RECURRENT MAJOR DEPRESSIVE DISORDER, UNSPECIFIED DEPRESSION EPISODE SEVERITY: ICD-10-CM

## 2022-10-25 RX ORDER — DULOXETIN HYDROCHLORIDE 30 MG/1
30 CAPSULE, DELAYED RELEASE ORAL DAILY
Qty: 30 CAPSULE | Refills: 0 | Status: SHIPPED | OUTPATIENT
Start: 2022-10-25 | End: 2022-11-22 | Stop reason: DRUGHIGH

## 2022-11-22 ENCOUNTER — OFFICE VISIT (OUTPATIENT)
Dept: FAMILY MEDICINE CLINIC | Facility: CLINIC | Age: 52
End: 2022-11-22

## 2022-11-22 VITALS
DIASTOLIC BLOOD PRESSURE: 72 MMHG | BODY MASS INDEX: 24.11 KG/M2 | TEMPERATURE: 97.8 F | OXYGEN SATURATION: 97 % | HEART RATE: 91 BPM | SYSTOLIC BLOOD PRESSURE: 126 MMHG | HEIGHT: 66 IN | WEIGHT: 150 LBS

## 2022-11-22 DIAGNOSIS — E83.119 HEMOCHROMATOSIS, UNSPECIFIED HEMOCHROMATOSIS TYPE: ICD-10-CM

## 2022-11-22 DIAGNOSIS — R11.2 NAUSEA AND VOMITING, UNSPECIFIED VOMITING TYPE: ICD-10-CM

## 2022-11-22 DIAGNOSIS — F33.9 EPISODE OF RECURRENT MAJOR DEPRESSIVE DISORDER, UNSPECIFIED DEPRESSION EPISODE SEVERITY: Primary | ICD-10-CM

## 2022-11-22 DIAGNOSIS — R53.83 FATIGUE, UNSPECIFIED TYPE: ICD-10-CM

## 2022-11-22 DIAGNOSIS — F41.9 ANXIETY: ICD-10-CM

## 2022-11-22 DIAGNOSIS — E55.9 VITAMIN D DEFICIENCY: ICD-10-CM

## 2022-11-22 DIAGNOSIS — E53.8 VITAMIN B12 DEFICIENCY: ICD-10-CM

## 2022-11-22 DIAGNOSIS — N60.82 SEBACEOUS CYST OF BREAST, LEFT: ICD-10-CM

## 2022-11-22 DIAGNOSIS — Z12.31 SCREENING MAMMOGRAM FOR BREAST CANCER: ICD-10-CM

## 2022-11-22 DIAGNOSIS — Z23 INFLUENZA VACCINATION ADMINISTERED AT CURRENT VISIT: ICD-10-CM

## 2022-11-22 LAB
25(OH)D3 SERPL-MCNC: 35.3 NG/ML (ref 30–100)
DEPRECATED RDW RBC AUTO: 41.7 FL (ref 37–54)
ERYTHROCYTE [DISTWIDTH] IN BLOOD BY AUTOMATED COUNT: 11.8 % (ref 12.3–15.4)
FERRITIN SERPL-MCNC: 150 NG/ML (ref 13–150)
FOLATE SERPL-MCNC: 5.02 NG/ML (ref 4.78–24.2)
HCT VFR BLD AUTO: 36.9 % (ref 34–46.6)
HGB BLD-MCNC: 13 G/DL (ref 12–15.9)
MCH RBC QN AUTO: 33.1 PG (ref 26.6–33)
MCHC RBC AUTO-ENTMCNC: 35.2 G/DL (ref 31.5–35.7)
MCV RBC AUTO: 93.9 FL (ref 79–97)
PLATELET # BLD AUTO: 204 10*3/MM3 (ref 140–450)
PMV BLD AUTO: 10.4 FL (ref 6–12)
RBC # BLD AUTO: 3.93 10*6/MM3 (ref 3.77–5.28)
TSH SERPL DL<=0.05 MIU/L-ACNC: 1.79 UIU/ML (ref 0.27–4.2)
VIT B12 BLD-MCNC: 341 PG/ML (ref 211–946)
WBC NRBC COR # BLD: 9.82 10*3/MM3 (ref 3.4–10.8)

## 2022-11-22 PROCEDURE — 82728 ASSAY OF FERRITIN: CPT | Performed by: NURSE PRACTITIONER

## 2022-11-22 PROCEDURE — 82746 ASSAY OF FOLIC ACID SERUM: CPT | Performed by: NURSE PRACTITIONER

## 2022-11-22 PROCEDURE — 90471 IMMUNIZATION ADMIN: CPT | Performed by: NURSE PRACTITIONER

## 2022-11-22 PROCEDURE — 99214 OFFICE O/P EST MOD 30 MIN: CPT | Performed by: NURSE PRACTITIONER

## 2022-11-22 PROCEDURE — 85027 COMPLETE CBC AUTOMATED: CPT | Performed by: NURSE PRACTITIONER

## 2022-11-22 PROCEDURE — 80053 COMPREHEN METABOLIC PANEL: CPT | Performed by: NURSE PRACTITIONER

## 2022-11-22 PROCEDURE — 82607 VITAMIN B-12: CPT | Performed by: NURSE PRACTITIONER

## 2022-11-22 PROCEDURE — 84443 ASSAY THYROID STIM HORMONE: CPT | Performed by: NURSE PRACTITIONER

## 2022-11-22 PROCEDURE — 90686 IIV4 VACC NO PRSV 0.5 ML IM: CPT | Performed by: NURSE PRACTITIONER

## 2022-11-22 PROCEDURE — 82306 VITAMIN D 25 HYDROXY: CPT | Performed by: NURSE PRACTITIONER

## 2022-11-22 RX ORDER — ONDANSETRON 4 MG/1
4 TABLET, ORALLY DISINTEGRATING ORAL EVERY 8 HOURS PRN
Qty: 30 TABLET | Refills: 5 | Status: SHIPPED | OUTPATIENT
Start: 2022-11-22

## 2022-11-22 RX ORDER — CYANOCOBALAMIN 1000 UG/ML
1000 INJECTION, SOLUTION INTRAMUSCULAR; SUBCUTANEOUS
Qty: 1 ML | Refills: 5 | Status: SHIPPED | OUTPATIENT
Start: 2022-11-22

## 2022-11-22 RX ORDER — DULOXETIN HYDROCHLORIDE 60 MG/1
60 CAPSULE, DELAYED RELEASE ORAL DAILY
Qty: 30 CAPSULE | Refills: 5 | Status: SHIPPED | OUTPATIENT
Start: 2022-11-22

## 2022-11-22 RX ORDER — NEEDLES, SAFETY 18GX1 1/2"
1 NEEDLE, DISPOSABLE MISCELLANEOUS
Qty: 6 EACH | Refills: 0 | Status: SHIPPED | OUTPATIENT
Start: 2022-11-22

## 2022-11-22 NOTE — PROGRESS NOTES
Venipuncture Blood Specimen Collection  Venipuncture performed in left arm by Verónica Newman with good hemostasis. Patient tolerated the procedure well without complications.   11/22/22   Verónica Newman

## 2022-11-22 NOTE — PROGRESS NOTES
Answers for HPI/ROS submitted by the patient on 11/22/2022  Please describe your symptoms.: I need My cymbalta adjusted to a higher dose and I want to get My lab work done today. I also want to have the spots on my underarms checked out because My aunt just had breast cancer that started on her underarm  Have you had these symptoms before?: No  How long have you been having these symptoms?: Greater than 2 weeks  What is the primary reason for your visit?: Other    Chief Complaint  Follow-up    Subjective            Holly Morin presents to [facility]  History of Present Illness  Pt is here for the f/U on the cymbalta and was doing well on the 30 mg--but closer to the holidays and then also with the Dx SAD--the gloomy colder weather really hits her and makes the depression worse and denies SI/HI --and patient does report that she has added stress with the fact that her  is a very negative individual and then also added stress with the holidays with the fact that she had lost her father    Also patient with a persistent postprandial cyclic nausea and vomiting and cannot see gastroenterology until 02/23/2023 at the earliest--and does need refills on the Zofran she tries to use it no more than once daily    Also patient with a history of hemochromatosis and as long as the ferritin levels remain under 200 she does not have to follow-up with hematology and have blood draws done she does need the ferritin levels drawn as she is still persistently fatigued    Also patient with a prior history of vitamin D deficiency and B12 deficiency and she actually used to be on B12 shots and she has seasonal affective disorder and the winter months are always worse she is very fatigued she would like to resume the B12 shots to see if this would help    And then also reports that she needs her mammogram ordered as she never did go in March when we first ordered it and she is not having any  nipple discharge or breast tenderness or breast lumps although she does still have that cyst superficially to the left breast and then also there are 2 tiny small areas under the arms that are superficial as well and the one is about the size of a BB and the other is the size of approximately 3 to 4 mm most and all are nontender        PHQ-2 Total Score: 19  PHQ-9 Total Score: 19    Past Medical History:   Diagnosis Date   • Anemia    • Arthritis of knee    • Carpal tunnel syndrome    • Constipation    • DDD (degenerative disc disease), lumbar    • Depression    • Fatigue    • GERD (gastroesophageal reflux disease)    • Heartburn    • Hiatal hernia    • High serum ferritin     DR HOWARD   • History of anesthesia reaction     SLOW TO WAKE   • Hx of common duodenal ulcer    • Knee pain, right     PAIN AND SWELLING   • LUQ pain    • Nausea    • Rectal bleeding    • Social anxiety disorder    • Wears glasses    • Weight loss        Allergies   Allergen Reactions   • Sulfa Antibiotics Swelling   • Penicillins Rash        Past Surgical History:   Procedure Laterality Date   • BLADDER SURGERY      BLADDER LIFT   • HYSTERECTOMY     • KNEE SURGERY Right     SX   • LAPAROSCOPIC CHOLECYSTECTOMY     • TEETH EXTRACTION     • X-STOP IMPLANTATION          Social History     Tobacco Use   • Smoking status: Every Day     Packs/day: 1.00     Types: Cigarettes     Last attempt to quit: 1/15/2018     Years since quittin.8   • Smokeless tobacco: Never   • Tobacco comments:     1 PPD SMOKED FOR 10 YEARS   Vaping Use   • Vaping Use: Never used       Family History   Problem Relation Age of Onset   • Pancreatic cancer Father    • Lung cancer Paternal Grandfather         Health Maintenance Due   Topic Date Due   • ANNUAL PHYSICAL  Never done   • MAMMOGRAM  03/10/2022        Current Outpatient Medications on File Prior to Visit   Medication Sig   • albuterol sulfate  (90 Base) MCG/ACT inhaler Ventolin HFA 90 mcg/actuation inhalation  HFA aerosol inhaler inhale 1 puff (90 mcg) by inhalation route every 6 hours as needed   Active   • baclofen (LIORESAL) 20 MG tablet Take 1 tablet by mouth 2 (Two) Times a Day As Needed for Muscle Spasms.   • Cholecalciferol 50 MCG (2000 UT) capsule Vitamin D3 50 mcg (2,000 unit) capsule   TAKE 1 CAPSULE BY MOUTH EVERY DAY   • cimetidine (TAGAMET) 200 MG tablet Take 1 tablet by mouth Daily.   • Diclofenac Sodium (VOLTAREN) 1 % gel gel Apply 4 g topically to the appropriate area as directed 4 (Four) Times a Day As Needed (bilat chronic knee pain).   • ergocalciferol (ERGOCALCIFEROL) 1.25 MG (45501 UT) capsule Take 1 capsule by mouth 1 (One) Time Per Week.   • folic acid (FOLVITE) 1 MG tablet Take 1 tablet by mouth Daily.   • selenium sulfide (SELSUN) 2.5 % lotion selenium sulfide 2.5 % lotion   APPLY TO THE AFFECTED AREA(S), lather, leave in place for 10 minutes AND then rinse off with water once daily for 7 days   • [DISCONTINUED] DULoxetine (CYMBALTA) 30 MG capsule Take 1 capsule by mouth Daily.   • [DISCONTINUED] ondansetron ODT (ZOFRAN-ODT) 4 MG disintegrating tablet Place 1 tablet on the tongue Every 8 (Eight) Hours As Needed for Nausea or Vomiting.   • [DISCONTINUED] vitamin B-12 (CYANOCOBALAMIN) 1000 MCG tablet Take 1,000 mcg by mouth Daily.     No current facility-administered medications on file prior to visit.       Immunization History   Administered Date(s) Administered   • Flu Vaccine Intradermal Quad 18-64YR 10/03/2014, 10/19/2017   • Flu Vaccine Split Quad 10/15/2015, 10/13/2016, 09/01/2017, 10/29/2018, 11/19/2019   • FluLaval/Fluzone >6mos 11/22/2022   • Tdap 08/30/2016       Review of Systems   Constitutional: Positive for fatigue.   Respiratory: Negative for shortness of breath.    Cardiovascular: Negative for chest pain.   Gastrointestinal: Positive for nausea and vomiting.        Still seeing the gastro--and they cannot see her for F/U until 2/23/23    Genitourinary: Negative for dysuria and  "vaginal bleeding.   Neurological: Positive for dizziness and light-headedness.        Somewhat    Psychiatric/Behavioral: Positive for depressed mood. Negative for self-injury and suicidal ideas. The patient is nervous/anxious.         Objective     /72 (BP Location: Right arm, Patient Position: Sitting, Cuff Size: Adult)   Pulse 91   Temp 97.8 °F (36.6 °C) (Temporal)   Ht 166.4 cm (65.5\")   Wt 68 kg (150 lb)   SpO2 97%   BMI 24.58 kg/m²       Physical Exam  Vitals and nursing note reviewed.   Constitutional:       Appearance: Normal appearance.      Comments: Dropped another 7#    HENT:      Head: Normocephalic.      Right Ear: External ear normal.      Left Ear: External ear normal.      Nose: Nose normal.      Mouth/Throat:      Comments: Wearing mask  Eyes:      Pupils: Pupils are equal, round, and reactive to light.   Cardiovascular:      Rate and Rhythm: Normal rate and regular rhythm.      Heart sounds: Normal heart sounds.   Pulmonary:      Effort: Pulmonary effort is normal.      Breath sounds: Normal breath sounds.   Chest:       Abdominal:      Palpations: Abdomen is soft.      Tenderness: There is no abdominal tenderness.   Musculoskeletal:         General: Normal range of motion.      Cervical back: Normal range of motion and neck supple.   Skin:     General: Skin is warm and dry.          Neurological:      Mental Status: She is alert and oriented to person, place, and time.   Psychiatric:         Mood and Affect: Mood normal.         Behavior: Behavior normal.         Thought Content: Thought content normal.         Judgment: Judgment normal.         Result Review :                          Assessment and Plan      Diagnoses and all orders for this visit:    1. Episode of recurrent major depressive disorder, unspecified depression episode severity (HCC) (Primary)  -     DULoxetine (CYMBALTA) 60 MG capsule; Take 1 capsule by mouth Daily.  Dispense: 30 capsule; Refill: 5  -     Vitamin " "D,25-Hydroxy  -     Vitamin B12 & Folate  -     Ferritin  -     CBC (No Diff)  -     Comprehensive Metabolic Panel  -     TSH    2. Anxiety  -     DULoxetine (CYMBALTA) 60 MG capsule; Take 1 capsule by mouth Daily.  Dispense: 30 capsule; Refill: 5  -     Vitamin D,25-Hydroxy  -     Vitamin B12 & Folate  -     Ferritin  -     CBC (No Diff)  -     Comprehensive Metabolic Panel  -     TSH    3. Nausea and vomiting, unspecified vomiting type  Comments:  happens post-prandially-chronically   Orders:  -     ondansetron ODT (ZOFRAN-ODT) 4 MG disintegrating tablet; Place 1 tablet on the tongue Every 8 (Eight) Hours As Needed for Nausea or Vomiting.  Dispense: 30 tablet; Refill: 5    4. Vitamin D deficiency  -     Vitamin D,25-Hydroxy    5. Vitamin B12 deficiency  -     Vitamin B12 & Folate  -     cyanocobalamin 1000 MCG/ML injection; Inject 1 mL into the appropriate muscle as directed by prescriber Every 28 (Twenty-Eight) Days.  Dispense: 1 mL; Refill: 5  -     Syringe/Needle, Disp, (BD Eclipse Syringe) 25G X 1\" 3 ML misc; 1 each Every 28 (Twenty-Eight) Days.  Dispense: 6 each; Refill: 0    6. Fatigue, unspecified type  -     Vitamin D,25-Hydroxy  -     Vitamin B12 & Folate  -     Ferritin  -     CBC (No Diff)  -     Comprehensive Metabolic Panel  -     TSH  -     Syringe/Needle, Disp, (BD Eclipse Syringe) 25G X 1\" 3 ML misc; 1 each Every 28 (Twenty-Eight) Days.  Dispense: 6 each; Refill: 0    7. Hemochromatosis, unspecified hemochromatosis type  -     Ferritin    8. Influenza vaccination administered at current visit  -     FluLaval/Fluzone >6 mos ([id number])    9. Screening mammogram for breast cancer  -     Mammo Screening Digital Tomosynthesis Bilateral With CAD; Future    10. Sebaceous cyst of breast, left  -     Mammo Screening Digital Tomosynthesis Bilateral With CAD; Future    we will order the mammo first then if anything persist then US and possible referral to the general surgeon--at this point patient declined " ultrasound    Then we will initiate the b12 shots again--pt very symptomatic    Also increased the cymbalta to the 60 mg dose--normally pt has done very well on this and advised patient to follow-up sooner rather than later should any symptoms persist or worsen and she verbalized her understanding           Follow Up     Return in about 6 months (around 5/22/2023), or if symptoms worsen or fail to improve, for Recheck.

## 2022-11-23 ENCOUNTER — TELEPHONE (OUTPATIENT)
Dept: FAMILY MEDICINE CLINIC | Facility: CLINIC | Age: 52
End: 2022-11-23

## 2022-11-23 DIAGNOSIS — E55.9 VITAMIN D DEFICIENCY: Primary | ICD-10-CM

## 2022-11-23 DIAGNOSIS — Z12.11 SCREENING FOR MALIGNANT NEOPLASM OF COLON: Primary | ICD-10-CM

## 2022-11-23 DIAGNOSIS — N60.82 SEBACEOUS CYST OF BREAST, LEFT: ICD-10-CM

## 2022-11-23 DIAGNOSIS — E87.6 HYPOKALEMIA: ICD-10-CM

## 2022-11-23 DIAGNOSIS — Z12.31 SCREENING MAMMOGRAM FOR BREAST CANCER: ICD-10-CM

## 2022-11-23 LAB
ALBUMIN SERPL-MCNC: 3.5 G/DL (ref 3.5–5.2)
ALBUMIN/GLOB SERPL: 1.3 G/DL
ALP SERPL-CCNC: 77 U/L (ref 39–117)
ALT SERPL W P-5'-P-CCNC: <5 U/L (ref 1–33)
ANION GAP SERPL CALCULATED.3IONS-SCNC: 5.6 MMOL/L (ref 5–15)
AST SERPL-CCNC: 13 U/L (ref 1–32)
BILIRUB SERPL-MCNC: 0.3 MG/DL (ref 0–1.2)
BUN SERPL-MCNC: 7 MG/DL (ref 6–20)
BUN/CREAT SERPL: 8.2 (ref 7–25)
CALCIUM SPEC-SCNC: 9 MG/DL (ref 8.6–10.5)
CHLORIDE SERPL-SCNC: 95 MMOL/L (ref 98–107)
CO2 SERPL-SCNC: 37.4 MMOL/L (ref 22–29)
CREAT SERPL-MCNC: 0.85 MG/DL (ref 0.57–1)
EGFRCR SERPLBLD CKD-EPI 2021: 82 ML/MIN/1.73
GLOBULIN UR ELPH-MCNC: 2.8 GM/DL
GLUCOSE SERPL-MCNC: 75 MG/DL (ref 65–99)
POTASSIUM SERPL-SCNC: 3 MMOL/L (ref 3.5–5.2)
PROT SERPL-MCNC: 6.3 G/DL (ref 6–8.5)
SODIUM SERPL-SCNC: 138 MMOL/L (ref 136–145)

## 2022-11-23 RX ORDER — POTASSIUM CHLORIDE 750 MG/1
10 TABLET, FILM COATED, EXTENDED RELEASE ORAL DAILY
Qty: 5 TABLET | Refills: 0 | Status: SHIPPED | OUTPATIENT
Start: 2022-11-23

## 2022-11-23 NOTE — PROGRESS NOTES
Please call and let Holly know that everything on the comprehensive panel was normal but her potassium was low at 3.0 and normal ranges 3.5-5.2 so I am going to send in a daily supplement for her for 5 days and then next week if she can stop by and get this rechecked I would appreciate it--and then let her know her ferritin was 150 and her thyroid levels were normal and her vitamin D levels were normal and her folic acid was normal and the vitamin B12 was low end of normal so we already sent in the B12 injections and blood counts were normal

## 2022-12-13 ENCOUNTER — APPOINTMENT (OUTPATIENT)
Dept: ULTRASOUND IMAGING | Facility: HOSPITAL | Age: 52
End: 2022-12-13

## 2022-12-13 ENCOUNTER — HOSPITAL ENCOUNTER (OUTPATIENT)
Dept: MAMMOGRAPHY | Facility: HOSPITAL | Age: 52
End: 2022-12-13

## 2023-02-06 ENCOUNTER — TELEPHONE (OUTPATIENT)
Dept: FAMILY MEDICINE CLINIC | Facility: CLINIC | Age: 53
End: 2023-02-06
Payer: COMMERCIAL

## 2023-02-06 DIAGNOSIS — N60.82 SEBACEOUS CYST OF BREAST, LEFT: Primary | ICD-10-CM

## 2023-02-06 DIAGNOSIS — Z12.31 ENCOUNTER FOR SCREENING MAMMOGRAM FOR MALIGNANT NEOPLASM OF BREAST: ICD-10-CM

## 2023-02-06 NOTE — TELEPHONE ENCOUNTER
Needs order changed from with CAD (2 D) to Mammo diagnostic bilateral with Gavin (3D) Tina in [place] scheduling called to let us know that no [facility] location does 2D anymore.

## 2023-05-02 DIAGNOSIS — E55.9 VITAMIN D DEFICIENCY: ICD-10-CM

## 2023-05-02 RX ORDER — ERGOCALCIFEROL 1.25 MG/1
CAPSULE ORAL
Qty: 5 CAPSULE | Refills: 2 | OUTPATIENT
Start: 2023-05-02

## 2023-05-15 DIAGNOSIS — F33.9 EPISODE OF RECURRENT MAJOR DEPRESSIVE DISORDER, UNSPECIFIED DEPRESSION EPISODE SEVERITY: ICD-10-CM

## 2023-05-15 DIAGNOSIS — F41.9 ANXIETY: ICD-10-CM

## 2023-05-15 RX ORDER — DULOXETIN HYDROCHLORIDE 60 MG/1
CAPSULE, DELAYED RELEASE ORAL
Qty: 30 CAPSULE | Refills: 0 | Status: SHIPPED | OUTPATIENT
Start: 2023-05-15

## 2023-06-06 ENCOUNTER — HOSPITAL ENCOUNTER (OUTPATIENT)
Dept: MAMMOGRAPHY | Facility: HOSPITAL | Age: 53
Discharge: HOME | End: 2023-06-06
Payer: COMMERCIAL

## 2023-06-06 ENCOUNTER — HOSPITAL ENCOUNTER (OUTPATIENT)
Dept: ULTRASOUND IMAGING | Facility: HOSPITAL | Age: 53
Discharge: HOME | End: 2023-06-06
Payer: COMMERCIAL

## 2023-06-06 DIAGNOSIS — Z12.31 SCREENING MAMMOGRAM FOR BREAST CANCER: ICD-10-CM

## 2023-06-06 DIAGNOSIS — N60.82 SEBACEOUS CYST OF BREAST, LEFT: ICD-10-CM

## 2023-06-06 DIAGNOSIS — Z12.31 ENCOUNTER FOR SCREENING MAMMOGRAM FOR MALIGNANT NEOPLASM OF BREAST: ICD-10-CM

## 2023-06-06 PROCEDURE — 76642 ULTRASOUND BREAST LIMITED: CPT

## 2023-06-06 PROCEDURE — G0279 TOMOSYNTHESIS, MAMMO: HCPCS

## 2023-06-06 PROCEDURE — 77066 DX MAMMO INCL CAD BI: CPT

## 2023-06-06 NOTE — PROGRESS NOTES
Please mail letter to patient stating    Holly the radiologist read your mammogram and ultrasound as follows:    FINDINGS:          There are no masses, architectural distortion, or suspicious microcalcifications to indicate right   or left breast malignancy.    Ultrasound scanning of the right axilla in the location of the patient's palpable lump demonstrates   an incidental benign sebaceous cyst.  It measures 5 x 3 x 5 mm.  Ultrasound scanning of the left   axilla demonstrates a benign lymph node.  It measures 11 x 13 mm.    This point, I would recommend clinical management and routine mammographic follow-up in 1 year.    The findings and recommendations were discussed with the patient.    IMPRESSION:  1. No mammographic findings to indicate right or left breast malignancy.    2. The right axillary lump corresponds to a 5 mm benign sebaceous cyst.  3. The left axillary lump corresponds to a benign 11 x 13 mm lymph node.      RECOMMENDATION(S):               ROUTINE MAMMOGRAM AND CLINICAL EVALUATION IN 12 MONTHS.

## 2023-06-06 NOTE — PROGRESS NOTES
Please mail letter to patient stating    Holly the radiologist read the ultrasound as follows:    IMPRESSION:  1. No mammographic findings to indicate right or left breast malignancy.    2. The right axillary lump corresponds to a 5 mm benign sebaceous cyst.  3. The left axillary lump corresponds to a benign 11 x 13 mm lymph node.      RECOMMENDATION(S):               ROUTINE MAMMOGRAM AND CLINICAL EVALUATION IN 12 MONTHS.      BIRADS:               DIAGNOSTIC CATEGORY 2--BENIGN FINDING

## 2023-08-09 ENCOUNTER — OFFICE VISIT (OUTPATIENT)
Dept: FAMILY MEDICINE CLINIC | Facility: CLINIC | Age: 53
End: 2023-08-09
Payer: COMMERCIAL

## 2023-08-09 VITALS
DIASTOLIC BLOOD PRESSURE: 78 MMHG | WEIGHT: 160.1 LBS | TEMPERATURE: 97.1 F | HEART RATE: 100 BPM | SYSTOLIC BLOOD PRESSURE: 142 MMHG | OXYGEN SATURATION: 98 % | BODY MASS INDEX: 26.24 KG/M2

## 2023-08-09 DIAGNOSIS — G89.29 CHRONIC PAIN OF BOTH KNEES: ICD-10-CM

## 2023-08-09 DIAGNOSIS — M25.561 CHRONIC PAIN OF BOTH KNEES: ICD-10-CM

## 2023-08-09 DIAGNOSIS — F41.9 ANXIETY: Primary | ICD-10-CM

## 2023-08-09 DIAGNOSIS — E55.9 VITAMIN D DEFICIENCY: ICD-10-CM

## 2023-08-09 DIAGNOSIS — M25.562 CHRONIC PAIN OF BOTH KNEES: ICD-10-CM

## 2023-08-09 DIAGNOSIS — E53.8 VITAMIN B12 DEFICIENCY: ICD-10-CM

## 2023-08-09 DIAGNOSIS — L72.3 SEBACEOUS CYST OF RIGHT AXILLA: ICD-10-CM

## 2023-08-09 DIAGNOSIS — F33.9 EPISODE OF RECURRENT MAJOR DEPRESSIVE DISORDER, UNSPECIFIED DEPRESSION EPISODE SEVERITY: ICD-10-CM

## 2023-08-09 DIAGNOSIS — E66.3 OVERWEIGHT (BMI 25.0-29.9): ICD-10-CM

## 2023-08-09 DIAGNOSIS — K59.00 CONSTIPATION, UNSPECIFIED CONSTIPATION TYPE: ICD-10-CM

## 2023-08-09 DIAGNOSIS — E53.8 FOLIC ACID DEFICIENCY: ICD-10-CM

## 2023-08-09 DIAGNOSIS — R79.89 ELEVATED FERRITIN LEVEL: ICD-10-CM

## 2023-08-09 DIAGNOSIS — R11.2 NAUSEA AND VOMITING, UNSPECIFIED VOMITING TYPE: ICD-10-CM

## 2023-08-09 DIAGNOSIS — M53.86 SCIATICA OF LEFT SIDE ASSOCIATED WITH DISORDER OF LUMBAR SPINE: ICD-10-CM

## 2023-08-09 DIAGNOSIS — K21.9 GASTROESOPHAGEAL REFLUX DISEASE WITHOUT ESOPHAGITIS: ICD-10-CM

## 2023-08-09 DIAGNOSIS — R59.0 LYMPHADENOPATHY, AXILLARY: ICD-10-CM

## 2023-08-09 LAB
25(OH)D3 SERPL-MCNC: 33.5 NG/ML (ref 30–100)
ALBUMIN SERPL-MCNC: 4.3 G/DL (ref 3.5–5.2)
ALBUMIN/GLOB SERPL: 1.4 G/DL
ALP SERPL-CCNC: 115 U/L (ref 39–117)
ALT SERPL W P-5'-P-CCNC: 9 U/L (ref 1–33)
ANION GAP SERPL CALCULATED.3IONS-SCNC: 8 MMOL/L (ref 5–15)
AST SERPL-CCNC: 13 U/L (ref 1–32)
BASOPHILS # BLD AUTO: 0.08 10*3/MM3 (ref 0–0.2)
BASOPHILS NFR BLD AUTO: 0.7 % (ref 0–1.5)
BILIRUB SERPL-MCNC: 0.2 MG/DL (ref 0–1.2)
BUN SERPL-MCNC: 7 MG/DL (ref 6–20)
BUN/CREAT SERPL: 7.4 (ref 7–25)
CALCIUM SPEC-SCNC: 10.2 MG/DL (ref 8.6–10.5)
CHLORIDE SERPL-SCNC: 98 MMOL/L (ref 98–107)
CO2 SERPL-SCNC: 33 MMOL/L (ref 22–29)
CREAT SERPL-MCNC: 0.95 MG/DL (ref 0.57–1)
DEPRECATED RDW RBC AUTO: 40.7 FL (ref 37–54)
EGFRCR SERPLBLD CKD-EPI 2021: 71.8 ML/MIN/1.73
EOSINOPHIL # BLD AUTO: 0.28 10*3/MM3 (ref 0–0.4)
EOSINOPHIL NFR BLD AUTO: 2.6 % (ref 0.3–6.2)
ERYTHROCYTE [DISTWIDTH] IN BLOOD BY AUTOMATED COUNT: 12.3 % (ref 12.3–15.4)
GLOBULIN UR ELPH-MCNC: 3.1 GM/DL
GLUCOSE SERPL-MCNC: 86 MG/DL (ref 65–99)
HCT VFR BLD AUTO: 43.1 % (ref 34–46.6)
HGB BLD-MCNC: 15 G/DL (ref 12–15.9)
IMM GRANULOCYTES # BLD AUTO: 0.04 10*3/MM3 (ref 0–0.05)
IMM GRANULOCYTES NFR BLD AUTO: 0.4 % (ref 0–0.5)
LYMPHOCYTES # BLD AUTO: 3.38 10*3/MM3 (ref 0.7–3.1)
LYMPHOCYTES NFR BLD AUTO: 30.8 % (ref 19.6–45.3)
MCH RBC QN AUTO: 31.8 PG (ref 26.6–33)
MCHC RBC AUTO-ENTMCNC: 34.8 G/DL (ref 31.5–35.7)
MCV RBC AUTO: 91.3 FL (ref 79–97)
MONOCYTES # BLD AUTO: 0.91 10*3/MM3 (ref 0.1–0.9)
MONOCYTES NFR BLD AUTO: 8.3 % (ref 5–12)
NEUTROPHILS NFR BLD AUTO: 57.2 % (ref 42.7–76)
NEUTROPHILS NFR BLD AUTO: 6.27 10*3/MM3 (ref 1.7–7)
NRBC BLD AUTO-RTO: 0 /100 WBC (ref 0–0.2)
PLATELET # BLD AUTO: 279 10*3/MM3 (ref 140–450)
PMV BLD AUTO: 10.3 FL (ref 6–12)
POTASSIUM SERPL-SCNC: 3.8 MMOL/L (ref 3.5–5.2)
PROT SERPL-MCNC: 7.4 G/DL (ref 6–8.5)
RBC # BLD AUTO: 4.72 10*6/MM3 (ref 3.77–5.28)
SODIUM SERPL-SCNC: 139 MMOL/L (ref 136–145)
WBC NRBC COR # BLD: 10.96 10*3/MM3 (ref 3.4–10.8)

## 2023-08-09 PROCEDURE — 85025 COMPLETE CBC W/AUTO DIFF WBC: CPT | Performed by: NURSE PRACTITIONER

## 2023-08-09 PROCEDURE — 82746 ASSAY OF FOLIC ACID SERUM: CPT | Performed by: NURSE PRACTITIONER

## 2023-08-09 PROCEDURE — 80053 COMPREHEN METABOLIC PANEL: CPT | Performed by: NURSE PRACTITIONER

## 2023-08-09 PROCEDURE — 82728 ASSAY OF FERRITIN: CPT | Performed by: NURSE PRACTITIONER

## 2023-08-09 PROCEDURE — 82607 VITAMIN B-12: CPT | Performed by: NURSE PRACTITIONER

## 2023-08-09 PROCEDURE — 82306 VITAMIN D 25 HYDROXY: CPT | Performed by: NURSE PRACTITIONER

## 2023-08-09 PROCEDURE — 84443 ASSAY THYROID STIM HORMONE: CPT | Performed by: NURSE PRACTITIONER

## 2023-08-09 RX ORDER — TIZANIDINE 4 MG/1
4 TABLET ORAL NIGHTLY PRN
Qty: 30 TABLET | Refills: 5 | Status: SHIPPED | OUTPATIENT
Start: 2023-08-09

## 2023-08-09 RX ORDER — FOLIC ACID 1 MG/1
1 TABLET ORAL DAILY
Qty: 30 TABLET | Refills: 5 | Status: SHIPPED | OUTPATIENT
Start: 2023-08-09

## 2023-08-09 RX ORDER — ONDANSETRON 4 MG/1
4 TABLET, ORALLY DISINTEGRATING ORAL EVERY 8 HOURS PRN
Qty: 30 TABLET | Refills: 5 | Status: SHIPPED | OUTPATIENT
Start: 2023-08-09

## 2023-08-09 RX ORDER — FAMOTIDINE 40 MG/1
40 TABLET, FILM COATED ORAL DAILY
Qty: 30 TABLET | Refills: 5 | Status: SHIPPED | OUTPATIENT
Start: 2023-08-09

## 2023-08-09 RX ORDER — DOCUSATE SODIUM 100 MG/1
100 CAPSULE, LIQUID FILLED ORAL 2 TIMES DAILY
Qty: 30 CAPSULE | Refills: 5 | Status: SHIPPED | OUTPATIENT
Start: 2023-08-09

## 2023-08-09 RX ORDER — CYANOCOBALAMIN 1000 UG/ML
1000 INJECTION, SOLUTION INTRAMUSCULAR; SUBCUTANEOUS
Qty: 1 ML | Refills: 5 | Status: SHIPPED | OUTPATIENT
Start: 2023-08-09

## 2023-08-09 RX ORDER — DULOXETINE 40 MG/1
80 CAPSULE, DELAYED RELEASE ORAL DAILY
Qty: 60 CAPSULE | Refills: 5 | Status: SHIPPED | OUTPATIENT
Start: 2023-08-09

## 2023-08-09 NOTE — PROGRESS NOTES
"Chief Complaint  Back Pain, Vitamin D Deficiency, Depression (Refills ), and Breast Problem (Discuss recent mammogram results )    Subjective            Holly Morin presents to [facility]  History of Present Illness  Patient is here today for her medication refills regarding her chronic comorbid conditions managed from the primary care standpoint and to obtain labs    -- Anxiety with depression: Patient denies all SI/HI but Pt reports been under a great deal of stress with her family--and then pt reports her sister went \"crazy\" and then \"put my niece out of our mom's home\"--Then pt reports unable to see her mother has now had an EPO on her and she is unable to see her for 1 year And then great deal of stress and anxiety And reports that all this happened end of April--and then trouble with getting motivated and feels very depressed and anxiety \"off the charts\" and she feels as though the Cymbalta needs to be increased from the 60 mg daily and she has tolerated this very well with no side effects no issues but would like to increase to 80 mg or the 90 mg which ever one her insurance will cover    --Low back pain with sciatica, chronic: Patient reports that she has been on the baclofen for quite some time now many months and it does not seem to be working as effectively and she would like to switch to something different and she thinks that the emergency room in the past had given her Zanaflex and she would like to try that again instead no issues with the medicine no side effects    --Chronic nausea with vomiting and GERD and chronic constipation: Patient tolerates medications very well with no side effects no issues reported no breakthrough symptoms reported and overall stable as long as she is on the medications but she does report that the constipation has been worse since she does not eat as much she does not have BMs as often and could possibly use a stool softener because " she has internal hemorrhoids and if she gets very constipated then it is painful and causes her to have rectal bleeding    --Vitamin B12 deficiency folic acid deficiency and vitamin D deficiency: Patient would like her medication refills and right now she is taking an over-the-counter vitamin D and she would like the labs drawn as she does still suffer with fatigue    --Elevated ferritin levels without the use of iron supplementation: Was seeing hematology and having to have blood drawn to remove some of the blood for the elevated ferritin levels and patient does want the level drawn as she has not had to have this lab drawl for this in quite some time and does admit to still being fatigued    --And then also with regards to the mammogram that we ordered an ultrasound and she finally got those both done in June and it did show a sebaceous cyst on the right axilla and a lymph node that appears benign in the left axilla--and patient would like to proceed with surgical eval just to see if anything can be done about these because she is unable to wear deodorant or shave because of the tenderness        PHQ-2 Total Score: 0  PHQ-9 Total Score: 0    Past Medical History:   Diagnosis Date    Anemia     Arthritis of knee     Carpal tunnel syndrome     Constipation     DDD (degenerative disc disease), lumbar     Depression     Fatigue     GERD (gastroesophageal reflux disease)     Heartburn     Hiatal hernia     High serum ferritin     DR HOWARD    History of anesthesia reaction     SLOW TO WAKE    Hx of common duodenal ulcer     Knee pain, right     PAIN AND SWELLING    LUQ pain     Nausea     Rectal bleeding     Social anxiety disorder     Wears glasses     Weight loss        Allergies   Allergen Reactions    Sulfa Antibiotics Swelling    Penicillins Rash        Past Surgical History:   Procedure Laterality Date    BLADDER SURGERY      BLADDER LIFT    HYSTERECTOMY      KNEE SURGERY Right     SX    LAPAROSCOPIC  "CHOLECYSTECTOMY      TEETH EXTRACTION      X-STOP IMPLANTATION          Social History     Tobacco Use    Smoking status: Every Day     Packs/day: 1.00     Years: 20.00     Pack years: 20.00     Types: Cigarettes    Smokeless tobacco: Never    Tobacco comments:     1 PPD SMOKED FOR 10 YEARS   Vaping Use    Vaping Use: Never used   Substance Use Topics    Alcohol use: Never    Drug use: Never       Family History   Problem Relation Age of Onset    Pancreatic cancer Father     Lung cancer Paternal Grandfather         Health Maintenance Due   Topic Date Due    COVID-19 Vaccine (1) Never done    Pneumococcal Vaccine 0-64 (1 - PCV) Never done    ZOSTER VACCINE (1 of 2) Never done    LUNG CANCER SCREENING  Never done    HEPATITIS C SCREENING  Never done    ANNUAL PHYSICAL  Never done        Current Outpatient Medications on File Prior to Visit   Medication Sig    albuterol sulfate  (90 Base) MCG/ACT inhaler Ventolin HFA 90 mcg/actuation inhalation HFA aerosol inhaler inhale 1 puff (90 mcg) by inhalation route every 6 hours as needed   Active    Cholecalciferol 50 MCG (2000 UT) capsule Vitamin D3 50 mcg (2,000 unit) capsule   TAKE 1 CAPSULE BY MOUTH EVERY DAY    ergocalciferol (ERGOCALCIFEROL) 1.25 MG (47209 UT) capsule Take 1 capsule by mouth 1 (One) Time Per Week.    selenium sulfide (SELSUN) 2.5 % lotion selenium sulfide 2.5 % lotion   APPLY TO THE AFFECTED AREA(S), lather, leave in place for 10 minutes AND then rinse off with water once daily for 7 days    Syringe/Needle, Disp, (BD Eclipse Syringe) 25G X 1\" 3 ML misc 1 each Every 28 (Twenty-Eight) Days.    [DISCONTINUED] cyanocobalamin 1000 MCG/ML injection Inject 1 mL into the appropriate muscle as directed by prescriber Every 28 (Twenty-Eight) Days.    [DISCONTINUED] Diclofenac Sodium (VOLTAREN) 1 % gel gel Apply 4 g topically to the appropriate area as directed 4 (Four) Times a Day As Needed (bilat chronic knee pain).    [DISCONTINUED] DULoxetine (CYMBALTA) " 60 MG capsule Take 1 capsule by mouth Daily.    [DISCONTINUED] folic acid (FOLVITE) 1 MG tablet Take 1 tablet by mouth Daily.    [DISCONTINUED] ondansetron ODT (ZOFRAN-ODT) 4 MG disintegrating tablet Place 1 tablet on the tongue Every 8 (Eight) Hours As Needed for Nausea or Vomiting.    [DISCONTINUED] baclofen (LIORESAL) 20 MG tablet Take 1 tablet by mouth 2 (Two) Times a Day As Needed for Muscle Spasms.    [DISCONTINUED] cimetidine (TAGAMET) 200 MG tablet Take 1 tablet by mouth Daily.    [DISCONTINUED] potassium chloride 10 MEQ CR tablet Take 1 tablet by mouth Daily.     No current facility-administered medications on file prior to visit.       Immunization History   Administered Date(s) Administered    Flu Vaccine Intradermal Quad 18-64YR 10/03/2014, 10/19/2017    Flu Vaccine Split Quad 10/15/2015, 10/13/2016, 09/01/2017, 10/29/2018, 11/19/2019    Fluzone >6mos 11/22/2022    Tdap 08/30/2016       Review of Systems   Constitutional:  Negative for fatigue, unexpected weight gain and unexpected weight loss.   HENT:  Negative for trouble swallowing.    Eyes: Negative.    Respiratory:  Negative for choking and shortness of breath.    Cardiovascular:  Negative for chest pain.   Gastrointestinal:  Positive for blood in stool, constipation and GERD. Negative for abdominal pain.        Gets constipated and has hemorrhoids    Endocrine: Negative.    Genitourinary:  Negative for breast discharge, dysuria and vaginal bleeding.        In the axilla swelling of the left axilla and per mammo--lymph node then also the tenderness of the right axilla and was a sebaceous cyst on mammo    Musculoskeletal:  Positive for back pain.   Allergic/Immunologic: Negative.    Neurological:  Negative for dizziness, seizures, syncope and light-headedness.   Hematological:  Positive for adenopathy.   Psychiatric/Behavioral:  Positive for sleep disturbance, depressed mood and stress. Negative for self-injury and suicidal ideas. The patient is  nervous/anxious.       Objective     /78 (BP Location: Right arm, Patient Position: Sitting, Cuff Size: Adult)   Pulse 100   Temp 97.1 øF (36.2 øC)   Wt 72.6 kg (160 lb 1.6 oz)   SpO2 98%   BMI 26.24 kg/mý       Physical Exam  Vitals and nursing note reviewed.   Constitutional:       Appearance: Normal appearance.   HENT:      Head: Normocephalic.      Nose: Nose normal.      Mouth/Throat:      Mouth: Mucous membranes are moist.   Eyes:      Pupils: Pupils are equal, round, and reactive to light.   Cardiovascular:      Rate and Rhythm: Normal rate and regular rhythm.      Heart sounds: Normal heart sounds.   Pulmonary:      Effort: Pulmonary effort is normal.      Breath sounds: Normal breath sounds.   Chest:          Comments: bilat tenderness and then mammo in June shows the sebaceous cyst and then the lymph node   Abdominal:      Palpations: Abdomen is soft.   Musculoskeletal:         General: Normal range of motion.      Cervical back: Normal range of motion and neck supple.   Skin:     General: Skin is warm and dry.   Neurological:      Mental Status: She is alert and oriented to person, place, and time.   Psychiatric:         Mood and Affect: Mood normal.         Behavior: Behavior normal.         Judgment: Judgment normal.       Result Review :                Mammo Diagnostic Digital Tomosynthesis Bilateral With CAD (06/06/2023 13:43)   US Breast Bilateral Limited (06/06/2023 14:53)            Assessment and Plan      Diagnoses and all orders for this visit:    1. Anxiety (Primary)  -     DULoxetine 40 MG capsule delayed-release particles; Take 2 capsules by mouth Daily.  Dispense: 60 capsule; Refill: 5  -     Vitamin D,25-Hydroxy  -     Vitamin B12 & Folate  -     Comprehensive Metabolic Panel  -     CBC & Differential  -     TSH    2. Episode of recurrent major depressive disorder, unspecified depression episode severity  -     DULoxetine 40 MG capsule delayed-release particles; Take 2 capsules  by mouth Daily.  Dispense: 60 capsule; Refill: 5    3. Sciatica of left side associated with disorder of lumbar spine  -     tiZANidine (Zanaflex) 4 MG tablet; Take 1 tablet by mouth At Night As Needed for Muscle Spasms.  Dispense: 30 tablet; Refill: 5    4. Vitamin B12 deficiency  -     cyanocobalamin 1000 MCG/ML injection; Inject 1 mL into the appropriate muscle as directed by prescriber Every 28 (Twenty-Eight) Days.  Dispense: 1 mL; Refill: 5  -     Vitamin B12 & Folate    5. Vitamin D deficiency  -     Vitamin D,25-Hydroxy    6. Folic acid deficiency  -     folic acid (FOLVITE) 1 MG tablet; Take 1 tablet by mouth Daily.  Dispense: 30 tablet; Refill: 5    7. Nausea and vomiting, unspecified vomiting type  Comments:  happens post-prandially-chronically   Orders:  -     ondansetron ODT (ZOFRAN-ODT) 4 MG disintegrating tablet; Place 1 tablet on the tongue Every 8 (Eight) Hours As Needed for Nausea or Vomiting.  Dispense: 30 tablet; Refill: 5    8. Chronic pain of both knees  -     Diclofenac Sodium (VOLTAREN) 1 % gel gel; Apply 4 g topically to the appropriate area as directed 4 (Four) Times a Day As Needed (bilat chronic knee pain).  Dispense: 100 g; Refill: 5    9. Gastroesophageal reflux disease without esophagitis  -     famotidine (Pepcid) 40 MG tablet; Take 1 tablet by mouth Daily.  Dispense: 30 tablet; Refill: 5    10. Constipation, unspecified constipation type  -     docusate sodium (Colace) 100 MG capsule; Take 1 capsule by mouth 2 (Two) Times a Day.  Dispense: 30 capsule; Refill: 5    11. Lymphadenopathy, axillary  Comments:  left  Orders:  -     Ambulatory Referral to General Surgery  -     CBC & Differential    12. Sebaceous cyst of right axilla  -     Ambulatory Referral to General Surgery  -     CBC & Differential    13. Elevated ferritin level  -     Ferritin    14. Overweight (BMI 25.0-29.9)  Comments:  counseled diet and exercise    We will obtain the labs today and proceed with a referral to  neurosurgeon regarding the sebaceous cyst in the right axilla and the lymph node that is enlarged in the left axilla for further evaluation since it is causing her discomfort both of which appear to be benign on the mammogram and ultrasound    Medication refills were done and we will increase the Cymbalta from 60 mg daily to 80 mg daily        Follow Up     Return in about 6 months (around 2/9/2024), or if symptoms worsen or fail to improve, for Recheck.    Patient was given instructions and counseling regarding her condition or for health maintenance advice. Please see specific information pulled into the AVS if appropriate.     BMI is >= 25 and <30. (Overweight) The following options were offered after discussion;: exercise counseling/recommendations and nutrition counseling/recommendations      Holly Morin  reports that she has been smoking cigarettes. She has a 20.00 pack-year smoking history. She has never used smokeless tobacco.. I have educated her on the risk of diseases from using tobacco products such as cancer, COPD, and heart disease.     I advised her to quit and she is not willing to quit

## 2023-08-09 NOTE — PROGRESS NOTES
Venipuncture Blood Specimen Collection  Venipuncture performed in left arm  by Kavya Morin with good hemostasis. Patient tolerated the procedure well without complications.   08/09/23   Kavya Morin

## 2023-08-10 LAB
FERRITIN SERPL-MCNC: 141 NG/ML (ref 13–150)
FOLATE SERPL-MCNC: 6.2 NG/ML (ref 4.78–24.2)
TSH SERPL DL<=0.05 MIU/L-ACNC: 0.91 UIU/ML (ref 0.27–4.2)
VIT B12 BLD-MCNC: 529 PG/ML (ref 211–946)

## 2023-08-11 NOTE — PROGRESS NOTES
Please mail letter to patient stating    Holly, the vitamin D level and the vitamin B12 and the folic acid and the thyroid levels were all normal range; and your ferritin level was normal range; and the comprehensive panel shows normal glucose and normal kidney and liver function test and normal electrolytes; and the blood counts were all normal with the exception of just a modest borderline elevated white blood cell count at 10.96 and it should be 10.80

## 2023-10-18 ENCOUNTER — TELEPHONE (OUTPATIENT)
Dept: SURGERY | Facility: CLINIC | Age: 53
End: 2023-10-18

## 2023-10-18 NOTE — TELEPHONE ENCOUNTER
CALLED PT TO OFFER R/S OF CX'D APPT FOR 10/18 W/ MIKAELA.    SPOKE W/ PT WHO DECLINED TO R/S STATING SHE WILL CALL BACK AT A LATER TIME.    ANYTHING ELSE TO DO?

## 2023-11-03 DIAGNOSIS — E53.8 VITAMIN B12 DEFICIENCY: ICD-10-CM

## 2023-11-03 DIAGNOSIS — R53.83 FATIGUE, UNSPECIFIED TYPE: ICD-10-CM

## 2023-11-04 RX ORDER — SYRINGE WITH NEEDLE, 1 ML 25GX5/8"
SYRINGE, EMPTY DISPOSABLE MISCELLANEOUS
Qty: 6 EACH | Refills: 0 | Status: SHIPPED | OUTPATIENT
Start: 2023-11-04

## 2023-11-04 NOTE — TELEPHONE ENCOUNTER
Goal Outcome Evaluation:      Plan of Care Reviewed With: patient          Outcome Evaluation: see below      Time: 3814-5405    Admission/Diagnosis:  S/P lung transplant (H) [Z94.2]  Dyspnea, unspecified type [R06.00]  Cellulitis of right breast [N61.0]  Edema of left upper extremity [R60.0]    /69 (BP Location: Right arm)   Pulse 99   Temp 98.5  F (36.9  C) (Oral)   Resp 16   Wt 60 kg (132 lb 4.8 oz)   LMP 06/07/2014 (Exact Date)   SpO2 100%   BMI 23.44 kg/m      Shift Updates: Pt A&Ox4, VSS on 1L O2 NC, up SBA. PRN Tessalon jeff for nonproductive cough given. L Fistula. R PIV SL. Skin dry, peeling.     Plan: Medically ready, anticipated discharge tomorrow pending continued stability.     Continue to monitor and with POC.      Patient aware.

## 2024-02-12 ENCOUNTER — TELEPHONE (OUTPATIENT)
Dept: FAMILY MEDICINE CLINIC | Facility: CLINIC | Age: 54
End: 2024-02-12

## 2024-02-12 DIAGNOSIS — F33.9 EPISODE OF RECURRENT MAJOR DEPRESSIVE DISORDER, UNSPECIFIED DEPRESSION EPISODE SEVERITY: ICD-10-CM

## 2024-02-12 DIAGNOSIS — F41.9 ANXIETY: ICD-10-CM

## 2024-02-12 RX ORDER — DULOXETINE 40 MG/1
80 CAPSULE, DELAYED RELEASE ORAL DAILY
Qty: 30 CAPSULE | Refills: 0 | Status: SHIPPED | OUTPATIENT
Start: 2024-02-12 | End: 2024-02-29

## 2024-02-12 NOTE — TELEPHONE ENCOUNTER
Caller: Holly Morin    Relationship: Self    Best call back number: [phone]    Requested Prescriptions:   Requested Prescriptions     Pending Prescriptions Disp Refills    DULoxetine HCl 40 MG capsule delayed-release particles 60 capsule 5     Sig: Take 2 capsules by mouth Daily.        Pharmacy where request should be sent: [facility] [address] [address] - [phone] [facility] [phone] FX     Last office visit with prescribing clinician: 8/9/2023   Last telemedicine visit with prescribing clinician: Visit date not found   Next office visit with prescribing clinician: 2/21/2024     Additional details provided by patient: PATIENT HAS TWO DAY SUPPLY OF MEDICATION. PATIENT WILL RUN OUT BEFORE HER APPT. PLEASE SEND NEW PRESCRIPTION TO PHARMACY ASAP.    Does the patient have less than a 3 day supply:  [x] Yes  [] No    Would you like a call back once the refill request has been completed: [] Yes [] No    If the office needs to give you a call back, can they leave a voicemail: [] Yes [] No    Raya Carson Rep   02/12/24 16:04 EST

## 2024-02-13 NOTE — TELEPHONE ENCOUNTER
Pharmacy Name: [facility] [place] - [address] [address] [address] - [phone]  - [phone]      Pharmacy representative name: REANNA    Pharmacy representative phone number: [phone]     What medication are you calling in regards to: DULOXETINE 40 MG    Who is the provider that prescribed the medication: ASAF OCHOA    Additional notes: THE MEDICATION WAS SENT IN WITH A QUANTITY OF 30 BUT THE PATIENT TAKES TWO TABLETS A DAY. PHARMACY JUST WANTED TO DOUBLE CHECK THAT THE PROVIDER ONLY WANTED THE REFILL TO INCLUDE A 15 DAY SUPPLY. PLEASE CALL TO ADVISE.

## 2024-02-28 DIAGNOSIS — F33.9 EPISODE OF RECURRENT MAJOR DEPRESSIVE DISORDER, UNSPECIFIED DEPRESSION EPISODE SEVERITY: ICD-10-CM

## 2024-02-28 DIAGNOSIS — F41.9 ANXIETY: ICD-10-CM

## 2024-02-28 NOTE — TELEPHONE ENCOUNTER
Severo notifying that no further refills can be sent in until seen as she is overdue a follow up visit with lorrie. - hub to read and ok to schedule

## 2024-02-29 RX ORDER — DULOXETINE 40 MG/1
2 CAPSULE, DELAYED RELEASE ORAL DAILY
Qty: 28 CAPSULE | Refills: 0 | Status: SHIPPED | OUTPATIENT
Start: 2024-02-29

## 2024-02-29 NOTE — TELEPHONE ENCOUNTER
PATIENT WAS SCHEDULED FOR THE NEXT AVAILABLE APPOINTMENT BUT SHE WILL RUN OUT OF THIS MEDICATION IN TWO DAYS.

## 2024-03-20 ENCOUNTER — OFFICE VISIT (OUTPATIENT)
Dept: FAMILY MEDICINE CLINIC | Facility: CLINIC | Age: 54
End: 2024-03-20
Payer: COMMERCIAL

## 2024-03-20 VITALS
TEMPERATURE: 97.6 F | OXYGEN SATURATION: 97 % | HEIGHT: 66 IN | DIASTOLIC BLOOD PRESSURE: 72 MMHG | SYSTOLIC BLOOD PRESSURE: 132 MMHG | BODY MASS INDEX: 25.55 KG/M2 | HEART RATE: 102 BPM | WEIGHT: 159 LBS

## 2024-03-20 DIAGNOSIS — Z11.59 ENCOUNTER FOR HEPATITIS C SCREENING TEST FOR LOW RISK PATIENT: ICD-10-CM

## 2024-03-20 DIAGNOSIS — F33.9 EPISODE OF RECURRENT MAJOR DEPRESSIVE DISORDER, UNSPECIFIED DEPRESSION EPISODE SEVERITY: Primary | ICD-10-CM

## 2024-03-20 DIAGNOSIS — R53.83 FATIGUE, UNSPECIFIED TYPE: ICD-10-CM

## 2024-03-20 DIAGNOSIS — K59.00 CONSTIPATION, UNSPECIFIED CONSTIPATION TYPE: ICD-10-CM

## 2024-03-20 DIAGNOSIS — R10.814 ABDOMINAL TENDERNESS OF LEFT LOWER QUADRANT, REBOUND TENDERNESS PRESENCE NOT SPECIFIED: ICD-10-CM

## 2024-03-20 DIAGNOSIS — E53.8 FOLIC ACID DEFICIENCY: ICD-10-CM

## 2024-03-20 DIAGNOSIS — R10.32 LLQ ABDOMINAL PAIN: ICD-10-CM

## 2024-03-20 DIAGNOSIS — M25.561 CHRONIC PAIN OF BOTH KNEES: ICD-10-CM

## 2024-03-20 DIAGNOSIS — G89.29 CHRONIC PAIN OF BOTH KNEES: ICD-10-CM

## 2024-03-20 DIAGNOSIS — E53.8 VITAMIN B12 DEFICIENCY: ICD-10-CM

## 2024-03-20 DIAGNOSIS — K64.8 INTERNAL HEMORRHOIDS: ICD-10-CM

## 2024-03-20 DIAGNOSIS — M25.562 CHRONIC PAIN OF BOTH KNEES: ICD-10-CM

## 2024-03-20 DIAGNOSIS — R11.2 NAUSEA AND VOMITING, UNSPECIFIED VOMITING TYPE: ICD-10-CM

## 2024-03-20 DIAGNOSIS — K21.9 GASTROESOPHAGEAL REFLUX DISEASE WITHOUT ESOPHAGITIS: ICD-10-CM

## 2024-03-20 DIAGNOSIS — M53.86 SCIATICA OF LEFT SIDE ASSOCIATED WITH DISORDER OF LUMBAR SPINE: ICD-10-CM

## 2024-03-20 DIAGNOSIS — Z12.31 SCREENING MAMMOGRAM FOR BREAST CANCER: ICD-10-CM

## 2024-03-20 DIAGNOSIS — H10.33 ACUTE BACTERIAL CONJUNCTIVITIS OF BOTH EYES: ICD-10-CM

## 2024-03-20 DIAGNOSIS — F41.9 ANXIETY: ICD-10-CM

## 2024-03-20 DIAGNOSIS — Z12.2 SCREENING FOR LUNG CANCER: ICD-10-CM

## 2024-03-20 LAB
25(OH)D3 SERPL-MCNC: 18.4 NG/ML (ref 30–100)
ALBUMIN SERPL-MCNC: 4.1 G/DL (ref 3.5–5.2)
ALBUMIN/GLOB SERPL: 1.4 G/DL
ALP SERPL-CCNC: 112 U/L (ref 39–117)
ALT SERPL W P-5'-P-CCNC: 11 U/L (ref 1–33)
AMYLASE SERPL-CCNC: 72 U/L (ref 28–100)
ANION GAP SERPL CALCULATED.3IONS-SCNC: 10 MMOL/L (ref 5–15)
AST SERPL-CCNC: 14 U/L (ref 1–32)
BASOPHILS # BLD AUTO: 0.06 10*3/MM3 (ref 0–0.2)
BASOPHILS NFR BLD AUTO: 0.6 % (ref 0–1.5)
BILIRUB BLD-MCNC: NEGATIVE MG/DL
BILIRUB SERPL-MCNC: 0.2 MG/DL (ref 0–1.2)
BUN SERPL-MCNC: 4 MG/DL (ref 6–20)
BUN/CREAT SERPL: 3.8 (ref 7–25)
CALCIUM SPEC-SCNC: 9.7 MG/DL (ref 8.6–10.5)
CHLORIDE SERPL-SCNC: 99 MMOL/L (ref 98–107)
CLARITY, POC: CLEAR
CO2 SERPL-SCNC: 32 MMOL/L (ref 22–29)
COLOR UR: NORMAL
CREAT SERPL-MCNC: 1.05 MG/DL (ref 0.57–1)
DEPRECATED RDW RBC AUTO: 43.3 FL (ref 37–54)
EGFRCR SERPLBLD CKD-EPI 2021: 63.3 ML/MIN/1.73
EOSINOPHIL # BLD AUTO: 0.23 10*3/MM3 (ref 0–0.4)
EOSINOPHIL NFR BLD AUTO: 2.4 % (ref 0.3–6.2)
ERYTHROCYTE [DISTWIDTH] IN BLOOD BY AUTOMATED COUNT: 12.7 % (ref 12.3–15.4)
EXPIRATION DATE: NORMAL
FOLATE SERPL-MCNC: 3.79 NG/ML (ref 4.78–24.2)
GLOBULIN UR ELPH-MCNC: 2.9 GM/DL
GLUCOSE SERPL-MCNC: 71 MG/DL (ref 65–99)
GLUCOSE UR STRIP-MCNC: NEGATIVE MG/DL
HCT VFR BLD AUTO: 42 % (ref 34–46.6)
HCV AB SER DONR QL: NORMAL
HGB BLD-MCNC: 14.3 G/DL (ref 12–15.9)
IMM GRANULOCYTES # BLD AUTO: 0.04 10*3/MM3 (ref 0–0.05)
IMM GRANULOCYTES NFR BLD AUTO: 0.4 % (ref 0–0.5)
KETONES UR QL: NEGATIVE
LEUKOCYTE EST, POC: NEGATIVE
LIPASE SERPL-CCNC: 32 U/L (ref 13–60)
LYMPHOCYTES # BLD AUTO: 2.5 10*3/MM3 (ref 0.7–3.1)
LYMPHOCYTES NFR BLD AUTO: 26.5 % (ref 19.6–45.3)
Lab: NORMAL
MCH RBC QN AUTO: 31.8 PG (ref 26.6–33)
MCHC RBC AUTO-ENTMCNC: 34 G/DL (ref 31.5–35.7)
MCV RBC AUTO: 93.5 FL (ref 79–97)
MONOCYTES # BLD AUTO: 0.78 10*3/MM3 (ref 0.1–0.9)
MONOCYTES NFR BLD AUTO: 8.3 % (ref 5–12)
NEUTROPHILS NFR BLD AUTO: 5.82 10*3/MM3 (ref 1.7–7)
NEUTROPHILS NFR BLD AUTO: 61.8 % (ref 42.7–76)
NITRITE UR-MCNC: NEGATIVE MG/ML
NRBC BLD AUTO-RTO: 0 /100 WBC (ref 0–0.2)
PH UR: 6.5 [PH] (ref 5–8)
PLATELET # BLD AUTO: 250 10*3/MM3 (ref 140–450)
PMV BLD AUTO: 10.6 FL (ref 6–12)
POTASSIUM SERPL-SCNC: 3.4 MMOL/L (ref 3.5–5.2)
PROT SERPL-MCNC: 7 G/DL (ref 6–8.5)
PROT UR STRIP-MCNC: NEGATIVE MG/DL
RBC # BLD AUTO: 4.49 10*6/MM3 (ref 3.77–5.28)
RBC # UR STRIP: NEGATIVE /UL
SODIUM SERPL-SCNC: 141 MMOL/L (ref 136–145)
SP GR UR: 1.01 (ref 1–1.03)
TSH SERPL DL<=0.05 MIU/L-ACNC: 1.35 UIU/ML (ref 0.27–4.2)
UROBILINOGEN UR QL: NORMAL
VIT B12 BLD-MCNC: 686 PG/ML (ref 211–946)
WBC NRBC COR # BLD AUTO: 9.43 10*3/MM3 (ref 3.4–10.8)

## 2024-03-20 PROCEDURE — 80053 COMPREHEN METABOLIC PANEL: CPT | Performed by: NURSE PRACTITIONER

## 2024-03-20 PROCEDURE — 82150 ASSAY OF AMYLASE: CPT | Performed by: NURSE PRACTITIONER

## 2024-03-20 PROCEDURE — 82607 VITAMIN B-12: CPT | Performed by: NURSE PRACTITIONER

## 2024-03-20 PROCEDURE — 85025 COMPLETE CBC W/AUTO DIFF WBC: CPT | Performed by: NURSE PRACTITIONER

## 2024-03-20 PROCEDURE — 83690 ASSAY OF LIPASE: CPT | Performed by: NURSE PRACTITIONER

## 2024-03-20 PROCEDURE — 82306 VITAMIN D 25 HYDROXY: CPT | Performed by: NURSE PRACTITIONER

## 2024-03-20 PROCEDURE — 86803 HEPATITIS C AB TEST: CPT | Performed by: NURSE PRACTITIONER

## 2024-03-20 PROCEDURE — 82746 ASSAY OF FOLIC ACID SERUM: CPT | Performed by: NURSE PRACTITIONER

## 2024-03-20 PROCEDURE — 84443 ASSAY THYROID STIM HORMONE: CPT | Performed by: NURSE PRACTITIONER

## 2024-03-20 RX ORDER — HYDROCORTISONE 25 MG/G
CREAM TOPICAL 2 TIMES DAILY
Qty: 28 G | Refills: 2 | Status: SHIPPED | OUTPATIENT
Start: 2024-03-20

## 2024-03-20 RX ORDER — ERYTHROMYCIN 5 MG/G
OINTMENT OPHTHALMIC EVERY 6 HOURS
Qty: 1 G | Refills: 0 | Status: SHIPPED | OUTPATIENT
Start: 2024-03-20

## 2024-03-20 RX ORDER — FOLIC ACID 1 MG/1
1 TABLET ORAL DAILY
Qty: 30 TABLET | Refills: 5 | Status: SHIPPED | OUTPATIENT
Start: 2024-03-20

## 2024-03-20 RX ORDER — DULOXETIN HYDROCHLORIDE 30 MG/1
90 CAPSULE, DELAYED RELEASE ORAL DAILY
Qty: 90 CAPSULE | Refills: 5 | Status: SHIPPED | OUTPATIENT
Start: 2024-03-20

## 2024-03-20 RX ORDER — CYANOCOBALAMIN 1000 UG/ML
1000 INJECTION, SOLUTION INTRAMUSCULAR; SUBCUTANEOUS
Qty: 1 ML | Refills: 5 | Status: SHIPPED | OUTPATIENT
Start: 2024-03-20

## 2024-03-20 RX ORDER — BUSPIRONE HYDROCHLORIDE 5 MG/1
5 TABLET ORAL 3 TIMES DAILY PRN
Qty: 90 TABLET | Refills: 5 | Status: SHIPPED | OUTPATIENT
Start: 2024-03-20

## 2024-03-20 RX ORDER — CYANOCOBALAMIN 1000 UG/ML
INJECTION, SOLUTION INTRAMUSCULAR; SUBCUTANEOUS
Qty: 1 ML | Refills: 5 | OUTPATIENT
Start: 2024-03-20

## 2024-03-20 RX ORDER — DOCUSATE SODIUM 100 MG/1
100 CAPSULE, LIQUID FILLED ORAL 2 TIMES DAILY
Qty: 30 CAPSULE | Refills: 5 | Status: SHIPPED | OUTPATIENT
Start: 2024-03-20

## 2024-03-20 RX ORDER — FAMOTIDINE 40 MG/1
40 TABLET, FILM COATED ORAL DAILY
Qty: 30 TABLET | Refills: 5 | Status: SHIPPED | OUTPATIENT
Start: 2024-03-20

## 2024-03-20 RX ORDER — ONDANSETRON 4 MG/1
4 TABLET, ORALLY DISINTEGRATING ORAL EVERY 8 HOURS PRN
Qty: 30 TABLET | Refills: 5 | Status: SHIPPED | OUTPATIENT
Start: 2024-03-20

## 2024-03-20 RX ORDER — TIZANIDINE 4 MG/1
4 TABLET ORAL NIGHTLY PRN
Qty: 30 TABLET | Refills: 5 | Status: SHIPPED | OUTPATIENT
Start: 2024-03-20

## 2024-03-20 RX ORDER — SYRINGE WITH NEEDLE, 1 ML 25GX5/8"
1 SYRINGE, EMPTY DISPOSABLE MISCELLANEOUS
Qty: 6 EACH | Refills: 0 | Status: SHIPPED | OUTPATIENT
Start: 2024-03-20

## 2024-03-20 NOTE — PROGRESS NOTES
Chief Complaint  Anxiety (Medication refills and discuss increasing her medication. )    Subjective            Holly Morin presents to [facility]  History of Present Illness  Patient is here today for medication refills for the chronic co-morbid conditions managed from the primary care standpoint--and to obtain labs--    --Depression with anxiety: regarding the anxiety and depression she would like to discuss increasing the dose on the Cymbalta--currently on 80 mg daily and truly feels as though receiving good efficacy from the mediation--vastly improved and far better than any of the other medications she was on in the past--per pt report--denies all SI/HI but does feel as though she would benefit from increasing the dose slightly on the Cymbalta and then possibly revisiting the idea of something separate for the anxiety--that she could just use as needed--as there are times she does admit she feels like she obsesses about things--as in if she gets something on her mind she just cannot let it go--and then reports at other times with nothing to be worried about she feels like she will feel like she has to find something to worry about--and several yrs ago was on buspar 15 mg PRN and did well on that int he past--and reports willing to trial that as something that could be added to the cymbalta     -- GERD with intermittent nausea and constipation issues: sees gastro as well--and does well on th e currently regimen she is on--and no breakthrough GERD s/s reported--and overall stable--also reports she does still deal with some rectal bleeding and admits is more with when she'd been straining for the BM and with wiping--and was told per pt report from gastro--that it was her internal hemorrhoids--although nothing was sent in--and she is willing to trial something for the hemorrhoids to see if this could help settle this down     --chronic Bilat knee pain and OA and lumbar spine  left sided sciatica--and does well on the medication with the voltarin gel topical and then also the muscle relaxer--and overall unchanged and stable for now --did see DR White int he past     --folic acid def and Vit B12 defi. And Vit D defic. --we will recheck all these levels--and admits to still fatigued     ________________________________________    Then also reports for approx 1 month now her bilat eyes have been red and draining and matted at times--and thinks she may need something for this     _______________________________________    Then also reports in the past few weeks experiencing LUQ and LLQ pain and some tenderness to the LLQ area of the abd--and reports having regular BM's           PHQ-2 Total Score: 7  PHQ-9 Total Score: 7    Past Medical History:   Diagnosis Date    Anemia     Arthritis of knee     Carpal tunnel syndrome     Constipation     DDD (degenerative disc disease), lumbar     Depression     Fatigue     GERD (gastroesophageal reflux disease)     Heartburn     Hiatal hernia     High serum ferritin     DR HOWARD    History of anesthesia reaction     SLOW TO WAKE    Hx of common duodenal ulcer     Knee pain, right     PAIN AND SWELLING    LUQ pain     Nausea     Rectal bleeding     Social anxiety disorder     Wears glasses     Weight loss        Allergies   Allergen Reactions    Sulfa Antibiotics Swelling    Penicillins Rash        Past Surgical History:   Procedure Laterality Date    BLADDER SURGERY      BLADDER LIFT    HYSTERECTOMY      KNEE SURGERY Right     SX    LAPAROSCOPIC CHOLECYSTECTOMY      TEETH EXTRACTION      X-STOP IMPLANTATION          Social History     Tobacco Use    Smoking status: Every Day     Current packs/day: 1.00     Average packs/day: 1 pack/day for 20.0 years (20.0 ttl pk-yrs)     Types: Cigarettes    Smokeless tobacco: Never    Tobacco comments:     1 PPD SMOKED FOR 10 YEARS   Vaping Use    Vaping status: Never Used   Substance Use Topics    Alcohol use: Never     "Drug use: Never       Family History   Problem Relation Age of Onset    Pancreatic cancer Father     Lung cancer Paternal Grandfather         Health Maintenance Due   Topic Date Due    LUNG CANCER SCREENING  Never done    HEPATITIS C SCREENING  Never done    ANNUAL PHYSICAL  Never done        Current Outpatient Medications on File Prior to Visit   Medication Sig    albuterol sulfate  (90 Base) MCG/ACT inhaler Ventolin HFA 90 mcg/actuation inhalation HFA aerosol inhaler inhale 1 puff (90 mcg) by inhalation route every 6 hours as needed   Active    Cholecalciferol 50 MCG (2000 UT) capsule Vitamin D3 50 mcg (2,000 unit) capsule   TAKE 1 CAPSULE BY MOUTH EVERY DAY    ergocalciferol (ERGOCALCIFEROL) 1.25 MG (26948 UT) capsule Take 1 capsule by mouth 1 (One) Time Per Week.    selenium sulfide (SELSUN) 2.5 % lotion selenium sulfide 2.5 % lotion   APPLY TO THE AFFECTED AREA(S), lather, leave in place for 10 minutes AND then rinse off with water once daily for 7 days    [DISCONTINUED] B-D 3CC LUER-KEYUR SYR 25GX1\" 25G X 1\" 3 ML misc USE WITH CYANOCOBALAMIN INJECTIONS EVERY 28 DAYS    [DISCONTINUED] cyanocobalamin 1000 MCG/ML injection Inject 1 mL into the appropriate muscle as directed by prescriber Every 28 (Twenty-Eight) Days.    [DISCONTINUED] Diclofenac Sodium (VOLTAREN) 1 % gel gel Apply 4 g topically to the appropriate area as directed 4 (Four) Times a Day As Needed (bilat chronic knee pain).    [DISCONTINUED] docusate sodium (Colace) 100 MG capsule Take 1 capsule by mouth 2 (Two) Times a Day.    [DISCONTINUED] DULoxetine HCl 40 MG capsule delayed-release particles Take 2 capsules by mouth Daily.    [DISCONTINUED] famotidine (Pepcid) 40 MG tablet Take 1 tablet by mouth Daily.    [DISCONTINUED] folic acid (FOLVITE) 1 MG tablet Take 1 tablet by mouth Daily.    [DISCONTINUED] ondansetron ODT (ZOFRAN-ODT) 4 MG disintegrating tablet Place 1 tablet on the tongue Every 8 (Eight) Hours As Needed for Nausea or Vomiting.    " "[DISCONTINUED] tiZANidine (Zanaflex) 4 MG tablet Take 1 tablet by mouth At Night As Needed for Muscle Spasms.     No current facility-administered medications on file prior to visit.       Immunization History   Administered Date(s) Administered    31-influenza Vac Quardvalent Preservativ 10/29/2018    Flu Vaccine Intradermal Quad 18-64YR 10/03/2014, 10/19/2017    Flu Vaccine Split Quad 10/15/2015, 10/13/2016, 09/01/2017, 10/29/2018, 11/19/2019    Fluzone (or Fluarix & Flulaval for VFC) >6mos 10/15/2015, 10/13/2016, 11/22/2022    Influenza Injectable Mdck Pf Quad 10/19/2017    Influenza Seasonal Injectable 10/03/2014    Tdap 08/30/2016       Review of Systems   Constitutional:  Positive for fatigue. Negative for chills and fever.   HENT:  Negative for trouble swallowing.    Eyes:  Positive for discharge and redness. Negative for itching.        1 month now --and worsening    Respiratory:  Negative for choking and shortness of breath.    Cardiovascular:  Negative for chest pain.   Gastrointestinal:  Positive for abdominal pain, anal bleeding, blood in stool, constipation, nausea and GERD. Negative for vomiting.        Hx of hemorrhoids--internal--with straining and constipation---and sees gastro for this and D/W Ms Bertrandmartita, ADRIEL--per pt report---will have some BRB with wiping --and some LUQ pain --3 weeks of pain and tenderness LLQ and LUQ    Endocrine: Negative for polydipsia, polyphagia and polyuria.   Genitourinary:  Negative for breast discharge, breast lump, breast pain, dysuria and vaginal bleeding.   Neurological:  Negative for dizziness, seizures, syncope and light-headedness.   Hematological:  Negative for adenopathy.   Psychiatric/Behavioral:  Positive for depressed mood. Negative for self-injury and suicidal ideas. The patient is nervous/anxious.         Objective     /72   Pulse 102   Temp 97.6 °F (36.4 °C) (Temporal)   Ht 166.4 cm (65.5\")   Wt 72.1 kg (159 lb)   SpO2 97%   BMI 26.06 kg/m²   "     Physical Exam  Vitals and nursing note reviewed.   Constitutional:       Appearance: Normal appearance.   HENT:      Head: Normocephalic.      Right Ear: External ear normal.      Left Ear: External ear normal.      Nose: Nose normal.      Mouth/Throat:      Mouth: Mucous membranes are moist.   Eyes:      Conjunctiva/sclera:      Right eye: Right conjunctiva is injected. Exudate present.      Left eye: Left conjunctiva is injected. Exudate present.      Pupils: Pupils are equal, round, and reactive to light.        Comments: Redness    Cardiovascular:      Rate and Rhythm: Normal rate and regular rhythm.      Heart sounds: Normal heart sounds.   Pulmonary:      Effort: Pulmonary effort is normal.      Breath sounds: Normal breath sounds.   Abdominal:      General: Bowel sounds are normal.      Palpations: Abdomen is soft.      Tenderness: There is abdominal tenderness in the left lower quadrant. There is no right CVA tenderness or left CVA tenderness.       Musculoskeletal:      Cervical back: Normal range of motion and neck supple.      Right knee: Bony tenderness and crepitus present. Decreased range of motion.      Left knee: Bony tenderness and crepitus present. Decreased range of motion.        Legs:    Skin:     General: Skin is warm and dry.   Neurological:      Mental Status: She is alert and oriented to person, place, and time.   Psychiatric:         Mood and Affect: Mood normal.         Behavior: Behavior normal.         Thought Content: Thought content normal.         Judgment: Judgment normal.         Result Review :                           Assessment and Plan      Diagnoses and all orders for this visit:    1. Episode of recurrent major depressive disorder, unspecified depression episode severity (Primary)  -     Vitamin D,25-Hydroxy  -     Vitamin B12 & Folate  -     CBC & Differential  -     Comprehensive Metabolic Panel  -     TSH Rfx On Abnormal To Free T4  -     DULoxetine (CYMBALTA) 30 MG  capsule; Take 3 capsules by mouth Daily.  Dispense: 90 capsule; Refill: 5  -     busPIRone (BUSPAR) 5 MG tablet; Take 1 tablet by mouth 3 (Three) Times a Day As Needed (amxiety).  Dispense: 90 tablet; Refill: 5    2. Anxiety  -     Vitamin D,25-Hydroxy  -     Vitamin B12 & Folate  -     CBC & Differential  -     Comprehensive Metabolic Panel  -     TSH Rfx On Abnormal To Free T4  -     DULoxetine (CYMBALTA) 30 MG capsule; Take 3 capsules by mouth Daily.  Dispense: 90 capsule; Refill: 5  -     busPIRone (BUSPAR) 5 MG tablet; Take 1 tablet by mouth 3 (Three) Times a Day As Needed (amxiety).  Dispense: 90 tablet; Refill: 5    3. Gastroesophageal reflux disease without esophagitis  -     famotidine (Pepcid) 40 MG tablet; Take 1 tablet by mouth Daily.  Dispense: 30 tablet; Refill: 5  -     Comprehensive Metabolic Panel    4. Constipation, unspecified constipation type  -     docusate sodium (Colace) 100 MG capsule; Take 1 capsule by mouth 2 (Two) Times a Day.  Dispense: 30 capsule; Refill: 5  -     Comprehensive Metabolic Panel    5. Nausea and vomiting, unspecified vomiting type  Comments:  happens post-prandially-chronically   Orders:  -     ondansetron ODT (ZOFRAN-ODT) 4 MG disintegrating tablet; Place 1 tablet on the tongue Every 8 (Eight) Hours As Needed for Nausea or Vomiting.  Dispense: 30 tablet; Refill: 5  -     Comprehensive Metabolic Panel  -     Amylase  -     Lipase  -     CT Abdomen Pelvis Without Contrast; Future    6. Internal hemorrhoids  -     Hydrocortisone, Perianal, (Procto-Med HC) 2.5 % rectal cream; Insert  into the rectum 2 (Two) Times a Day.  Dispense: 28 g; Refill: 2    7. Chronic pain of both knees  -     Diclofenac Sodium (VOLTAREN) 1 % gel gel; Apply 4 g topically to the appropriate area as directed 4 (Four) Times a Day As Needed (bilat chronic knee pain).  Dispense: 100 g; Refill: 5  -     Comprehensive Metabolic Panel    8. Sciatica of left side associated with disorder of lumbar spine  -    "  tiZANidine (Zanaflex) 4 MG tablet; Take 1 tablet by mouth At Night As Needed for Muscle Spasms.  Dispense: 30 tablet; Refill: 5  -     Comprehensive Metabolic Panel    9. Vitamin B12 deficiency  -     cyanocobalamin 1000 MCG/ML injection; Inject 1 mL into the appropriate muscle as directed by prescriber Every 28 (Twenty-Eight) Days.  Dispense: 1 mL; Refill: 5  -     Syringe/Needle, Disp, (B-D 3CC LUER-KEYUR SYR 25GX1\") 25G X 1\" 3 ML misc; Inject 1 mL into the appropriate muscle as directed by prescriber Every 30 (Thirty) Days.  Dispense: 6 each; Refill: 0  -     Vitamin B12 & Folate    10. Folic acid deficiency  -     folic acid (FOLVITE) 1 MG tablet; Take 1 tablet by mouth Daily.  Dispense: 30 tablet; Refill: 5  -     Vitamin B12 & Folate    11. Fatigue, unspecified type  -     Syringe/Needle, Disp, (B-D 3CC LUER-KEYUR SYR 25GX1\") 25G X 1\" 3 ML misc; Inject 1 mL into the appropriate muscle as directed by prescriber Every 30 (Thirty) Days.  Dispense: 6 each; Refill: 0  -     Vitamin D,25-Hydroxy    12. Acute bacterial conjunctivitis of both eyes  -     erythromycin (ROMYCIN) 5 MG/GM ophthalmic ointment; Administer  to both eyes Every 6 (Six) Hours.  Dispense: 1 g; Refill: 0    13. Encounter for hepatitis C screening test for low risk patient  -     Hepatitis C Antibody    14. Screening for lung cancer  -      CT Chest Low Dose Cancer Screening WO; Future    15. Screening mammogram for breast cancer  -     Mammo Screening Digital Tomosynthesis Bilateral With CAD; Future    16. LLQ abdominal pain  -     Amylase  -     Lipase  -     CT Abdomen Pelvis Without Contrast; Future  -     POCT urinalysis dipstick, automated    17. Abdominal tenderness of left lower quadrant, rebound tenderness presence not specified  -     Amylase  -     Lipase  -     CT Abdomen Pelvis Without Contrast; Future  -     POCT urinalysis dipstick, automated    UA in house was normal--and then obtaining the labs as noted above--and then medication " refills as noted above--with the increase on the cymbalta from 80 mg to 90 mg daily--then adding back the lowest dose buspar as well for PRN use--and then we will re-eval in 3 months --or sooner if needed     Then also proceeding with the CT of the abd--concerning for either diverticulitis or colitis--and should anything persist or worsen pt to got  to he ER dept     Also empiric Tx of the conjunctivitis     Also sending int he Rx for the Tx cream for the hemorrhoids         Follow Up     Return in about 3 months (around 6/20/2024), or if symptoms worsen or fail to improve, for Recheck on anxiety .    Patient was given instructions and counseling regarding her condition or for health maintenance advice. Please see specific information pulled into the AVS if appropriate.            Holly CHRISTINE Morin  reports that she has been smoking cigarettes. She has a 20 pack-year smoking history. She has never used smokeless tobacco. I have educated her on the risk of diseases from using tobacco products such as cancer, COPD, and heart disease.     I advised her to quit and she is not able to set a quit date--would like to try to get the anxiety under control first     I spent 3  minutes counseling the patient.

## 2024-03-20 NOTE — PROGRESS NOTES
Venipuncture Blood Specimen Collection  Venipuncture performed in left arm  by Kavya Morin with good hemostasis. Patient tolerated the procedure well without complications.   03/20/24   Kavya Morin

## 2024-03-21 DIAGNOSIS — E55.9 VITAMIN D DEFICIENCY: ICD-10-CM

## 2024-03-21 RX ORDER — ERGOCALCIFEROL 1.25 MG/1
50000 CAPSULE ORAL WEEKLY
Qty: 5 CAPSULE | Refills: 2 | Status: SHIPPED | OUTPATIENT
Start: 2024-03-21

## 2024-03-21 NOTE — PROGRESS NOTES
Please mail letter to patient stating    Holly, the urinalysis was normal and blood counts were normal and thyroid levels were normal and amylase and lipase for the stomach issues was normal and the hepatitis C antibody screening was negative    And then the comprehensive panel shows normal glucose and normal kidney and liver function tests and all of the electrolytes were normal with the exception of the potassium borderline decreased at 3.4 and should be 3.5-5.2    And your vitamin B12 was normal range but the folic acid was low and I know we refilled the folic acid because I do not know if you had run out and that maybe that is why it was slightly decreased    And then the vitamin D was very low so I will send in the once weekly high-dose vitamin D2 50,000 IUs weekly x 3 months

## 2024-03-26 DIAGNOSIS — N63.20 MASS OF LEFT BREAST, UNSPECIFIED QUADRANT: Primary | ICD-10-CM

## 2024-03-27 ENCOUNTER — TELEPHONE (OUTPATIENT)
Dept: FAMILY MEDICINE CLINIC | Facility: CLINIC | Age: 54
End: 2024-03-27

## 2024-04-03 ENCOUNTER — HOSPITAL ENCOUNTER (OUTPATIENT)
Dept: CT IMAGING | Facility: HOSPITAL | Age: 54
Discharge: HOME | End: 2024-04-03
Payer: COMMERCIAL

## 2024-04-03 DIAGNOSIS — R10.814 ABDOMINAL TENDERNESS OF LEFT LOWER QUADRANT, REBOUND TENDERNESS PRESENCE NOT SPECIFIED: ICD-10-CM

## 2024-04-03 DIAGNOSIS — Z12.2 SCREENING FOR LUNG CANCER: ICD-10-CM

## 2024-04-03 DIAGNOSIS — R11.2 NAUSEA AND VOMITING, UNSPECIFIED VOMITING TYPE: ICD-10-CM

## 2024-04-03 DIAGNOSIS — R10.32 LLQ ABDOMINAL PAIN: ICD-10-CM

## 2024-04-03 PROCEDURE — 71271 CT THORAX LUNG CANCER SCR C-: CPT

## 2024-04-03 PROCEDURE — 74176 CT ABD & PELVIS W/O CONTRAST: CPT

## 2024-04-03 NOTE — PROGRESS NOTES
Please mail letter to patient stating    Holly, the CT of the abdomen and pelvis shows the following: Asymmetric thickening of the bladder wall greater anteriorly which could suggest chronic cystitis and normally I refer patients onward to the urologist so that they can do a little cystoscopy which is like looking inside the bladder to make sure that there is no tumors or masses-and with your history of smoking this would be a good idea to move forward with-please let me know if you are okay with me doing that     and then there is some diverticulosis but no diverticulitis and no inflammatory processes of the colon    And then there was degenerative disc disease of the lumbar spine and facet arthropathy which is arthritis of the lumbar spine and then there was mild atherosclerosis which the way to keep that from getting worse is to eat a low-cholesterol diet and stay active maintain good glucose and good blood pressure control

## 2024-04-04 NOTE — PROGRESS NOTES
Please mail letter to patient stating    Holly, CT of the chest for lung cancer screening shows no suspicious pulmonary nodules and they do recommend continuing the yearly screenings annually

## 2024-04-25 ENCOUNTER — OFFICE VISIT (OUTPATIENT)
Dept: FAMILY MEDICINE CLINIC | Facility: CLINIC | Age: 54
End: 2024-04-25
Payer: COMMERCIAL

## 2024-04-25 VITALS
DIASTOLIC BLOOD PRESSURE: 102 MMHG | HEART RATE: 109 BPM | WEIGHT: 154 LBS | BODY MASS INDEX: 25.24 KG/M2 | TEMPERATURE: 98.4 F | OXYGEN SATURATION: 98 % | SYSTOLIC BLOOD PRESSURE: 140 MMHG

## 2024-04-25 DIAGNOSIS — R21 RASH AND NONSPECIFIC SKIN ERUPTION: ICD-10-CM

## 2024-04-25 DIAGNOSIS — L02.419 AXILLARY ABSCESS: Primary | ICD-10-CM

## 2024-04-25 DIAGNOSIS — R03.0 ELEVATED BLOOD PRESSURE READING WITHOUT DIAGNOSIS OF HYPERTENSION: ICD-10-CM

## 2024-04-25 DIAGNOSIS — R07.81 RIB PAIN ON LEFT SIDE: ICD-10-CM

## 2024-04-25 PROCEDURE — 87076 CULTURE ANAEROBE IDENT EACH: CPT | Performed by: NURSE PRACTITIONER

## 2024-04-25 PROCEDURE — 87070 CULTURE OTHR SPECIMN AEROBIC: CPT | Performed by: NURSE PRACTITIONER

## 2024-04-25 PROCEDURE — 87205 SMEAR GRAM STAIN: CPT | Performed by: NURSE PRACTITIONER

## 2024-04-25 RX ORDER — CEPHALEXIN 500 MG/1
500 CAPSULE ORAL 3 TIMES DAILY
Qty: 21 CAPSULE | Refills: 0 | Status: SHIPPED | OUTPATIENT
Start: 2024-04-25

## 2024-04-25 NOTE — PROGRESS NOTES
Chief Complaint  Rib Injury (Fell 2 weeks ago and still in pain ), Rash (Has a rash ), and Cyst (Has a rusty under right arm pit )    History of Present Illness  Holly Morin is a 53 y.o. female who presents to [facility] with a past medical history of  Past Medical History:   Diagnosis Date    Anemia     Arthritis of knee     Carpal tunnel syndrome     Constipation     DDD (degenerative disc disease), lumbar     Depression     Fatigue     GERD (gastroesophageal reflux disease)     Heartburn     Hiatal hernia     High serum ferritin     DR HOWARD    History of anesthesia reaction     SLOW TO WAKE    Hx of common duodenal ulcer     Knee pain, right     PAIN AND SWELLING    LUQ pain     Nausea     Rectal bleeding     Social anxiety disorder     Wears glasses     Weight loss      Holly presents to the office today secondary to left rib pain.    She states that the rib pain started approximately 2 weeks ago.  Onset was seemingly after a fall.  She leaned over and fell into the drawer on the bottom of her stove.  The next day she woke up and she had a rash.  She states prior to the fall she did have a burning sensation of her skin in the same area.  She states it was burning and tingling, but there was no rash prior to the fall.  She had the fall and bumped her ribs into the drawer.  The next day she had a blistery rash along the lower aspect of the left ribs.  The rash has been improving but is still present.  The rash was not itchy, but more blistery and burning.  The rash was only present in the region of the left ribs.    Additionally, she states that there is a spot under her right armpit.  She has not been able to shave due to the area being painful.  She states that she and her PCP have been monitoring this area for quite some time, but it seemed to have come to ahead over the past 3 days.  She has been trying to apply a warm compress.  There has been no active drainage or  exudate.  She denies fever, chills, or bodyaches.     Patient's blood pressure is elevated on exam today 140/102.  No previous history of hypertension.  No complaints of chest pain, palpitations, headaches, dizziness, or shortness of breath.  Only complaint is that of pain in the right axilla and pain in the left ribs.    Objective   Vital Signs:   Vitals:    04/25/24 1358   BP: (!) 140/102   BP Location: Left arm   Patient Position: Sitting   Pulse: 109   Temp: 98.4 °F (36.9 °C)   TempSrc: Infrared   SpO2: 98%   Weight: 69.9 kg (154 lb)     Body mass index is 25.24 kg/m².    Wt Readings from Last 3 Encounters:   04/25/24 69.9 kg (154 lb)   03/20/24 72.1 kg (159 lb)   08/09/23 72.6 kg (160 lb 1.6 oz)     BP Readings from Last 3 Encounters:   04/25/24 (!) 140/102   03/20/24 132/72   08/09/23 142/78       Health Maintenance   Topic Date Due    ANNUAL PHYSICAL  Never done    COVID-19 Vaccine (1 - 2023-24 season) 03/01/2025 (Originally 9/1/2023)    ZOSTER VACCINE (1 of 2) 03/01/2025 (Originally 9/24/2019)    Pneumococcal Vaccine 0-64 (1 of 2 - PCV) 03/20/2025 (Originally 9/24/1976)    INFLUENZA VACCINE  08/01/2024    BMI FOLLOWUP  08/09/2024    LUNG CANCER SCREENING  04/03/2025    MAMMOGRAM  06/06/2025    TDAP/TD VACCINES (2 - Td or Tdap) 08/30/2026    COLORECTAL CANCER SCREENING  10/01/2028    HEPATITIS C SCREENING  Completed       Physical Exam  Vitals reviewed.   Constitutional:       General: She is not in acute distress.     Appearance: Normal appearance. She is well-developed.   HENT:      Head: Normocephalic and atraumatic.   Eyes:      General: No scleral icterus.        Right eye: No discharge.         Left eye: No discharge.      Extraocular Movements: Extraocular movements intact.      Conjunctiva/sclera: Conjunctivae normal.   Neck:      Trachea: Trachea normal.   Cardiovascular:      Rate and Rhythm: Normal rate and regular rhythm.      Pulses: Normal pulses.      Heart sounds: No murmur heard.  Pulmonary:       Effort: Pulmonary effort is normal.      Breath sounds: Normal breath sounds.   Abdominal:      General: Bowel sounds are normal. There is no distension.      Palpations: Abdomen is soft. There is no mass.      Tenderness: There is no abdominal tenderness.   Musculoskeletal:         General: Normal range of motion.      Cervical back: Normal range of motion and neck supple. No tenderness.   Lymphadenopathy:      Cervical: No cervical adenopathy.   Skin:     General: Skin is warm and dry.      Findings: Abscess and rash present.          Neurological:      Mental Status: She is alert and oriented to person, place, and time.   Psychiatric:         Mood and Affect: Mood and affect normal.         Behavior: Behavior normal.         Thought Content: Thought content normal.         Judgment: Judgment normal.         Result Review :  The following data was reviewed by: ASAF Jeffrey on 04/25/2024:      Incision & Drainage    Date/Time: 4/25/2024 2:18 PM    Performed by: Trina Forrester APRN  Authorized by: Trina Forrester APRN  Type: abscess  Body area: upper extremity (right axilla)  Anesthesia: local infiltration    Anesthesia:  Local Anesthetic: lidocaine 1% with epinephrine  Anesthetic total: 3 mL    Sedation:  Patient sedated: no    Scalpel size: 15  Incision type: single straight  Drainage: purulent  Drainage amount: moderate  Wound treatment: wound left open  Packing material: none  Patient tolerance: patient tolerated the procedure well with no immediate complications  Comments: Using aseptic technique, site was cleaned with betadine and alcohol. A single straight incision, approx. 5mm in length made into the abscess. Moderate purulent drainage expressed and culture obtained. Clean dressing applied. Patient tolerated well.         US Breast Bilateral Limited (06/06/2023 14:53)        Assessment and Plan   Diagnoses and all orders for this visit:    1. Axillary abscess (Primary)  -      Incision & Drainage  -     Wound Culture - Wound, Axilla, Right  -     cephalexin (Keflex) 500 MG capsule; Take 1 capsule by mouth 3 (Three) Times a Day.  Dispense: 21 capsule; Refill: 0  -     mupirocin (BACTROBAN) 2 % ointment; Apply 1 Application topically to the appropriate area as directed 3 (Three) Times a Day.  Dispense: 15 g; Refill: 0    2. Rib pain on left side  -     XR Ribs Left With PA Chest; Future    3. Rash and nonspecific skin eruption  Comments:  Consider shingles given onset of pain/tingling prior to outbreak of rash. Rash outbreak after fall, and likley more coincidental rather than causative.    4. Elevated blood pressure reading without diagnosis of hypertension  Comments:  Blood pressure elevation likely secondary to pain.  Recommend follow-up with PCP to reassess.                  FOLLOW UP  Return if symptoms worsen or fail to improve.    Right axillary abscess versus sebaceous cyst- purulent drainage expressed thus culture obtained, but previous axillary ultrasound suggestive of sebaceous cyst.  I will treat with Keflex 3 times daily for 7 days while we await culture results.  Apply mupirocin 3 times daily.  May use warm compress if needed.  Monitor for infection and return with recurrence.  We will call her with culture results once received.      In regards to her left rib pain, x-ray obtained today.  I do not appreciate an acute fracture, but we will confirm this with radiology interpretation and notify the patient with results once received.    In evaluating the rash also near the left ribs, given her symptoms prior to fall, I suspect that she actually has a shingles rash, which we have discussed.  Rash has been present for the past 2 weeks, but has been improving.  Patient advised that I believe the rash was more coincidental after the fall rather than the fall causing the rash.  She will monitor and let us know if this does not resolve.    Blood pressure was elevated on exam today;  "however, patient was noted to be in pain with right axillary abscess and left rib pain.  Left rib pain has been improving and right axillary abscess was drained today.  Recommend follow-up with PCP to reassess blood pressure.    Patient was given instructions and counseling regarding her condition or for health maintenance advice. Please see specific information pulled into the AVS if appropriate.       Trina SHARON Forrester, APRN  04/25/24  14:58 EDT    CURRENT & DISCONTINUED MEDICATIONS  Current Outpatient Medications   Medication Instructions    albuterol sulfate  (90 Base) MCG/ACT inhaler Ventolin HFA 90 mcg/actuation inhalation HFA aerosol inhaler inhale 1 puff (90 mcg) by inhalation route every 6 hours as needed   Active    busPIRone (BUSPAR) 5 mg, Oral, 3 Times Daily PRN    cephalexin (KEFLEX) 500 mg, Oral, 3 Times Daily    Cholecalciferol 50 MCG (2000 UT) capsule Vitamin D3 50 mcg (2,000 unit) capsule   TAKE 1 CAPSULE BY MOUTH EVERY DAY    cyanocobalamin 1,000 mcg, Intramuscular, Every 28 Days    Diclofenac Sodium (VOLTAREN) 4 g, Topical, 4 Times Daily PRN    docusate sodium (COLACE) 100 mg, Oral, 2 Times Daily    DULoxetine (CYMBALTA) 90 mg, Oral, Daily    ergocalciferol (ERGOCALCIFEROL) 50,000 Units, Oral, Weekly    erythromycin (ROMYCIN) 5 MG/GM ophthalmic ointment Both Eyes, Every 6 Hours    famotidine (PEPCID) 40 mg, Oral, Daily    folic acid (FOLVITE) 1 mg, Oral, Daily    Hydrocortisone, Perianal, (Procto-Med HC) 2.5 % rectal cream Rectal, 2 Times Daily    mupirocin (BACTROBAN) 2 % ointment 1 Application, Topical, 3 Times Daily    ondansetron ODT (ZOFRAN-ODT) 4 mg, Translingual, Every 8 Hours PRN    selenium sulfide (SELSUN) 2.5 % lotion selenium sulfide 2.5 % lotion   APPLY TO THE AFFECTED AREA(S), lather, leave in place for 10 minutes AND then rinse off with water once daily for 7 days    Syringe/Needle, Disp, (B-D 3CC LUER-KEYUR SYR 25GX1\") 25G X 1\" 3 ML misc 1 mL, Intramuscular, Every 30 Days    " tiZANidine (ZANAFLEX) 4 mg, Oral, Nightly PRN       There are no discontinued medications.

## 2024-04-29 LAB
BACTERIA SPEC AEROBE CULT: ABNORMAL
GRAM STN SPEC: ABNORMAL
GRAM STN SPEC: ABNORMAL

## 2024-07-09 ENCOUNTER — OFFICE VISIT (OUTPATIENT)
Dept: FAMILY MEDICINE CLINIC | Facility: CLINIC | Age: 54
End: 2024-07-09
Payer: COMMERCIAL

## 2024-07-09 VITALS
BODY MASS INDEX: 22.98 KG/M2 | WEIGHT: 143 LBS | HEIGHT: 66 IN | SYSTOLIC BLOOD PRESSURE: 136 MMHG | TEMPERATURE: 97.9 F | HEART RATE: 122 BPM | OXYGEN SATURATION: 98 % | DIASTOLIC BLOOD PRESSURE: 70 MMHG

## 2024-07-09 DIAGNOSIS — R53.83 OTHER FATIGUE: ICD-10-CM

## 2024-07-09 DIAGNOSIS — R82.998 LEUKOCYTES IN URINE: ICD-10-CM

## 2024-07-09 DIAGNOSIS — R35.0 URINARY FREQUENCY: ICD-10-CM

## 2024-07-09 DIAGNOSIS — R10.12 LUQ PAIN: ICD-10-CM

## 2024-07-09 DIAGNOSIS — J02.9 SORE THROAT: ICD-10-CM

## 2024-07-09 DIAGNOSIS — R82.2 BILIRUBIN IN URINE: ICD-10-CM

## 2024-07-09 DIAGNOSIS — K13.79 MOUTH SORES: ICD-10-CM

## 2024-07-09 DIAGNOSIS — J01.00 ACUTE NON-RECURRENT MAXILLARY SINUSITIS: ICD-10-CM

## 2024-07-09 DIAGNOSIS — B08.5 HERPANGINA: ICD-10-CM

## 2024-07-09 DIAGNOSIS — R11.2 NAUSEA AND VOMITING, UNSPECIFIED VOMITING TYPE: ICD-10-CM

## 2024-07-09 DIAGNOSIS — E86.0 MILD DEHYDRATION: Primary | ICD-10-CM

## 2024-07-09 DIAGNOSIS — R05.1 ACUTE COUGH: ICD-10-CM

## 2024-07-09 LAB
ANION GAP SERPL CALCULATED.3IONS-SCNC: 12.9 MMOL/L (ref 5–15)
BASOPHILS # BLD AUTO: 0.05 10*3/MM3 (ref 0–0.2)
BASOPHILS NFR BLD AUTO: 0.4 % (ref 0–1.5)
BILIRUB BLD-MCNC: ABNORMAL MG/DL
BUN SERPL-MCNC: 5 MG/DL (ref 6–20)
BUN/CREAT SERPL: 5.6 (ref 7–25)
CALCIUM SPEC-SCNC: 9.6 MG/DL (ref 8.6–10.5)
CHLORIDE SERPL-SCNC: 92 MMOL/L (ref 98–107)
CLARITY, POC: ABNORMAL
CO2 SERPL-SCNC: 34.1 MMOL/L (ref 22–29)
COLOR UR: ABNORMAL
CREAT SERPL-MCNC: 0.89 MG/DL (ref 0.57–1)
DEPRECATED RDW RBC AUTO: 41.6 FL (ref 37–54)
EGFRCR SERPLBLD CKD-EPI 2021: 77.2 ML/MIN/1.73
EOSINOPHIL # BLD AUTO: 0.15 10*3/MM3 (ref 0–0.4)
EOSINOPHIL NFR BLD AUTO: 1.2 % (ref 0.3–6.2)
ERYTHROCYTE [DISTWIDTH] IN BLOOD BY AUTOMATED COUNT: 12.8 % (ref 12.3–15.4)
EXPIRATION DATE: ABNORMAL
EXPIRATION DATE: NORMAL
EXPIRATION DATE: NORMAL
FLUAV AG UPPER RESP QL IA.RAPID: NOT DETECTED
FLUBV AG UPPER RESP QL IA.RAPID: NOT DETECTED
FOLATE SERPL-MCNC: 4.4 NG/ML (ref 4.78–24.2)
GLUCOSE SERPL-MCNC: 99 MG/DL (ref 65–99)
GLUCOSE UR STRIP-MCNC: NEGATIVE MG/DL
HCT VFR BLD AUTO: 38.5 % (ref 34–46.6)
HGB BLD-MCNC: 13.4 G/DL (ref 12–15.9)
IMM GRANULOCYTES # BLD AUTO: 0.05 10*3/MM3 (ref 0–0.05)
IMM GRANULOCYTES NFR BLD AUTO: 0.4 % (ref 0–0.5)
INTERNAL CONTROL: NORMAL
INTERNAL CONTROL: NORMAL
KETONES UR QL: ABNORMAL
LEUKOCYTE EST, POC: ABNORMAL
LYMPHOCYTES # BLD AUTO: 2.88 10*3/MM3 (ref 0.7–3.1)
LYMPHOCYTES NFR BLD AUTO: 23.9 % (ref 19.6–45.3)
Lab: ABNORMAL
Lab: NORMAL
Lab: NORMAL
MAGNESIUM SERPL-MCNC: 1.5 MG/DL (ref 1.6–2.6)
MCH RBC QN AUTO: 31.5 PG (ref 26.6–33)
MCHC RBC AUTO-ENTMCNC: 34.8 G/DL (ref 31.5–35.7)
MCV RBC AUTO: 90.4 FL (ref 79–97)
MONOCYTES # BLD AUTO: 0.95 10*3/MM3 (ref 0.1–0.9)
MONOCYTES NFR BLD AUTO: 7.9 % (ref 5–12)
NEUTROPHILS NFR BLD AUTO: 66.2 % (ref 42.7–76)
NEUTROPHILS NFR BLD AUTO: 7.98 10*3/MM3 (ref 1.7–7)
NITRITE UR-MCNC: NEGATIVE MG/ML
NRBC BLD AUTO-RTO: 0 /100 WBC (ref 0–0.2)
PH UR: 6 [PH] (ref 5–8)
PLATELET # BLD AUTO: 230 10*3/MM3 (ref 140–450)
PMV BLD AUTO: 10.5 FL (ref 6–12)
POTASSIUM SERPL-SCNC: 2.5 MMOL/L (ref 3.5–5.2)
PROT UR STRIP-MCNC: ABNORMAL MG/DL
RBC # BLD AUTO: 4.26 10*6/MM3 (ref 3.77–5.28)
RBC # UR STRIP: NEGATIVE /UL
S PYO AG THROAT QL: NEGATIVE
SARS-COV-2 AG UPPER RESP QL IA.RAPID: NOT DETECTED
SODIUM SERPL-SCNC: 139 MMOL/L (ref 136–145)
SP GR UR: 1.02 (ref 1–1.03)
UROBILINOGEN UR QL: ABNORMAL
VIT B12 BLD-MCNC: 608 PG/ML (ref 211–946)
WBC NRBC COR # BLD AUTO: 12.06 10*3/MM3 (ref 3.4–10.8)

## 2024-07-09 PROCEDURE — 82746 ASSAY OF FOLIC ACID SERUM: CPT | Performed by: NURSE PRACTITIONER

## 2024-07-09 PROCEDURE — 83735 ASSAY OF MAGNESIUM: CPT | Performed by: NURSE PRACTITIONER

## 2024-07-09 PROCEDURE — 99214 OFFICE O/P EST MOD 30 MIN: CPT | Performed by: NURSE PRACTITIONER

## 2024-07-09 PROCEDURE — 1159F MED LIST DOCD IN RCRD: CPT | Performed by: NURSE PRACTITIONER

## 2024-07-09 PROCEDURE — 87880 STREP A ASSAY W/OPTIC: CPT | Performed by: NURSE PRACTITIONER

## 2024-07-09 PROCEDURE — 1160F RVW MEDS BY RX/DR IN RCRD: CPT | Performed by: NURSE PRACTITIONER

## 2024-07-09 PROCEDURE — 80048 BASIC METABOLIC PNL TOTAL CA: CPT | Performed by: NURSE PRACTITIONER

## 2024-07-09 PROCEDURE — 87086 URINE CULTURE/COLONY COUNT: CPT | Performed by: NURSE PRACTITIONER

## 2024-07-09 PROCEDURE — 87428 SARSCOV & INF VIR A&B AG IA: CPT | Performed by: NURSE PRACTITIONER

## 2024-07-09 PROCEDURE — 82607 VITAMIN B-12: CPT | Performed by: NURSE PRACTITIONER

## 2024-07-09 PROCEDURE — 1125F AMNT PAIN NOTED PAIN PRSNT: CPT | Performed by: NURSE PRACTITIONER

## 2024-07-09 PROCEDURE — 85025 COMPLETE CBC W/AUTO DIFF WBC: CPT | Performed by: NURSE PRACTITIONER

## 2024-07-09 RX ORDER — CEPHALEXIN 500 MG/1
500 CAPSULE ORAL 3 TIMES DAILY
Qty: 30 CAPSULE | Refills: 0 | Status: SHIPPED | OUTPATIENT
Start: 2024-07-09

## 2024-07-09 NOTE — PROGRESS NOTES
Chief Complaint  Sore Throat (Mouth is sore, throat is sore), Cough (Cough, congestion, staying lightheaded x's 1.5 week with some and 4 or 5 days with some symptoms. ), and Dehydration (Possible uti)    Subjective            Holly CHRISTINE Morin presents to [facility]  History of Present Illness  Patient is here today for an acute illness as an acute same-day work and visit    Patient reports that she has had a sore throat and mouth sores as well as a cough and congestion staying lightheaded with the symptoms that started mildly 1-1/2 weeks ago that then progressed and got far worse the last 4 to 5 days-and she is also concerned she may be a little dehydrated and possibly has a urinary tract infection as she started burning this morning    Also reports the sores in her mouth and then so painful she has not been able to eat    Also experiencing some left upper quadrant tenderness and pain and she is very concerned possibly pancreas related    And then also patient reports that few months back she had a fall on that left side she did follow-up here she had x-rays of the left ribs and chest shows no fractures nothing dislocated but the area is still slightly swelled       PHQ-2 Total Score:    PHQ-9 Total Score:      Past Medical History:   Diagnosis Date    Anemia     Arthritis of knee     Carpal tunnel syndrome     Constipation     DDD (degenerative disc disease), lumbar     Depression     Fatigue     GERD (gastroesophageal reflux disease)     Heartburn     Hiatal hernia     High serum ferritin     DR HOWARD    History of anesthesia reaction     SLOW TO WAKE    Hx of common duodenal ulcer     Knee pain, right     PAIN AND SWELLING    LUQ pain     Nausea     Rectal bleeding     Social anxiety disorder     Wears glasses     Weight loss        Allergies   Allergen Reactions    Sulfa Antibiotics Swelling    Penicillins Rash        Past Surgical History:   Procedure Laterality Date    BLADDER  SURGERY      BLADDER LIFT    HYSTERECTOMY      KNEE SURGERY Right     SX    LAPAROSCOPIC CHOLECYSTECTOMY      TEETH EXTRACTION      X-STOP IMPLANTATION          Social History     Tobacco Use    Smoking status: Every Day     Average packs/day: 1 pack/day for 20.0 years (20.0 ttl pk-yrs)     Types: Cigarettes     Start date: 1/1/2004    Smokeless tobacco: Never    Tobacco comments:     1 PPD SMOKED FOR 10 YEARS   Vaping Use    Vaping status: Never Used   Substance Use Topics    Alcohol use: Never    Drug use: Never       Family History   Problem Relation Age of Onset    Pancreatic cancer Father     Arthritis Father     Cancer Father         Pancreas cancer    Kidney disease Father     Lung cancer Paternal Grandfather     Cancer Paternal Grandfather         Lung cancer    Anxiety disorder Mother     Cancer Mother         Non elizabeth lymphoma    Depression Mother     Diabetes Mother     Heart disease Maternal Grandmother         Health Maintenance Due   Topic Date Due    ANNUAL PHYSICAL  Never done        Current Outpatient Medications on File Prior to Visit   Medication Sig    albuterol sulfate  (90 Base) MCG/ACT inhaler Ventolin HFA 90 mcg/actuation inhalation HFA aerosol inhaler inhale 1 puff (90 mcg) by inhalation route every 6 hours as needed   Active    busPIRone (BUSPAR) 5 MG tablet Take 1 tablet by mouth 3 (Three) Times a Day As Needed (amxiety).    Cholecalciferol 50 MCG (2000 UT) capsule Vitamin D3 50 mcg (2,000 unit) capsule   TAKE 1 CAPSULE BY MOUTH EVERY DAY    cyanocobalamin 1000 MCG/ML injection Inject 1 mL into the appropriate muscle as directed by prescriber Every 28 (Twenty-Eight) Days.    Diclofenac Sodium (VOLTAREN) 1 % gel gel Apply 4 g topically to the appropriate area as directed 4 (Four) Times a Day As Needed (bilat chronic knee pain).    docusate sodium (Colace) 100 MG capsule Take 1 capsule by mouth 2 (Two) Times a Day.    DULoxetine (CYMBALTA) 30 MG capsule Take 3 capsules by mouth  "Daily.    ergocalciferol (ERGOCALCIFEROL) 1.25 MG (43448 UT) capsule Take 1 capsule by mouth 1 (One) Time Per Week.    famotidine (Pepcid) 40 MG tablet Take 1 tablet by mouth Daily.    folic acid (FOLVITE) 1 MG tablet Take 1 tablet by mouth Daily.    Hydrocortisone, Perianal, (Procto-Med HC) 2.5 % rectal cream Insert  into the rectum 2 (Two) Times a Day.    mupirocin (BACTROBAN) 2 % ointment Apply 1 Application topically to the appropriate area as directed 3 (Three) Times a Day.    ondansetron ODT (ZOFRAN-ODT) 4 MG disintegrating tablet Place 1 tablet on the tongue Every 8 (Eight) Hours As Needed for Nausea or Vomiting.    selenium sulfide (SELSUN) 2.5 % lotion selenium sulfide 2.5 % lotion   APPLY TO THE AFFECTED AREA(S), lather, leave in place for 10 minutes AND then rinse off with water once daily for 7 days    Syringe/Needle, Disp, (B-D 3CC LUER-KEYUR SYR 25GX1\") 25G X 1\" 3 ML misc Inject 1 mL into the appropriate muscle as directed by prescriber Every 30 (Thirty) Days.    tiZANidine (Zanaflex) 4 MG tablet Take 1 tablet by mouth At Night As Needed for Muscle Spasms.    [DISCONTINUED] cephalexin (Keflex) 500 MG capsule Take 1 capsule by mouth 3 (Three) Times a Day.    [DISCONTINUED] erythromycin (ROMYCIN) 5 MG/GM ophthalmic ointment Administer  to both eyes Every 6 (Six) Hours.     No current facility-administered medications on file prior to visit.       Immunization History   Administered Date(s) Administered    31-influenza Vac Quardvalent Preservativ 10/29/2018    Flu Vaccine Intradermal Quad 18-64YR 10/03/2014, 10/19/2017    Flu Vaccine Split Quad 10/15/2015, 10/13/2016, 09/01/2017, 10/29/2018, 11/19/2019    Fluzone (or Fluarix & Flulaval for VFC) >6mos 10/15/2015, 10/13/2016, 11/22/2022    Influenza Injectable Mdck Pf Quad 10/19/2017    Influenza Seasonal Injectable 10/03/2014    Tdap 08/30/2016       Review of Systems   Constitutional:  Positive for fatigue and unexpected weight loss.   HENT:  Positive for " "congestion, dental problem, postnasal drip, rhinorrhea, sinus pressure and sore throat.    Respiratory:  Positive for cough.    Cardiovascular:  Negative for chest pain.   Gastrointestinal:  Positive for abdominal pain, constipation, nausea and vomiting. Negative for diarrhea.   Genitourinary:  Positive for dysuria.   Musculoskeletal:  Positive for arthralgias.   Neurological:  Positive for weakness and light-headedness.        Objective     /70   Pulse (!) 122   Temp 97.9 °F (36.6 °C) (Temporal)   Ht 166.4 cm (65.5\")   Wt 64.9 kg (143 lb)   SpO2 98%   BMI 23.43 kg/m²       Physical Exam  Vitals and nursing note reviewed. Exam conducted with a chaperone present ().   Constitutional:       Appearance: Normal appearance.      Comments: Patient has lost 11 pounds in the past 2-1/2 months   HENT:      Head: Normocephalic.      Right Ear: Tympanic membrane, ear canal and external ear normal.      Left Ear: Tympanic membrane, ear canal and external ear normal.      Nose: Nose normal.      Right Sinus: Maxillary sinus tenderness present.      Left Sinus: Maxillary sinus tenderness present.      Mouth/Throat:      Mouth: Mucous membranes are moist.        Comments: Poor dentition noted    Also very red tender beefy appearing inner gumline between the lips and the teeth upper and lower-with a few little scattered blisters consistent with herpangina  Eyes:      Pupils: Pupils are equal, round, and reactive to light.   Cardiovascular:      Rate and Rhythm: Normal rate and regular rhythm.      Heart sounds: Normal heart sounds.   Pulmonary:      Effort: Pulmonary effort is normal.      Breath sounds: Normal breath sounds.   Abdominal:      Palpations: Abdomen is soft.      Tenderness: There is abdominal tenderness in the left upper quadrant.   Musculoskeletal:         General: Normal range of motion.      Cervical back: Normal range of motion and neck supple.   Skin:     General: Skin is warm and dry. "   Neurological:      Mental Status: She is alert and oriented to person, place, and time.   Psychiatric:         Mood and Affect: Mood normal.         Behavior: Behavior normal.         Thought Content: Thought content normal.         Judgment: Judgment normal.         Result Review :     The following data was reviewed by: ASAF Ayala on 07/09/2024:    UA          3/20/2024    11:33 7/9/2024    15:28   Urinalysis   Ketones, UA Negative  Trace    Leukocytes, UA Negative  Large (3+)      POCT rapid strep A (07/09/2024 15:32)  POCT SARS-CoV-2 Antigen GUILHERME + Flu (07/09/2024 15:28)  POCT urinalysis dipstick, automated (07/09/2024 15:28)    Strep          7/9/2024    15:32   Common Labs   POC Strep A, Molecular Negative                 XR Ribs Left With PA Chest (04/25/2024 14:27)      Assessment and Plan      Diagnoses and all orders for this visit:    1. Mild dehydration (Primary)  -     POCT urinalysis dipstick, automated  -     POCT rapid strep A  -     POCT SARS-CoV-2 Antigen GUILHERME + Flu  -     Basic Metabolic Panel    2. LUQ pain  -     Amylase  -     Lipase  -     CBC & Differential  -     Magnesium  -     Basic Metabolic Panel  -     Hepatic Function Panel    3. Nausea and vomiting, unspecified vomiting type  -     Amylase  -     Lipase  -     CBC & Differential  -     Magnesium  -     Basic Metabolic Panel  -     Hepatic Function Panel    4. Bilirubin in urine  -     Amylase  -     Lipase  -     CBC & Differential  -     Magnesium  -     Basic Metabolic Panel  -     Hepatic Function Panel    5. Acute cough  -     POCT urinalysis dipstick, automated  -     POCT rapid strep A  -     POCT SARS-CoV-2 Antigen GUILHERME + Flu  -     cephalexin (Keflex) 500 MG capsule; Take 1 capsule by mouth 3 (Three) Times a Day.  Dispense: 30 capsule; Refill: 0    6. Sore throat  -     POCT urinalysis dipstick, automated  -     POCT rapid strep A  -     POCT SARS-CoV-2 Antigen GUILHERME + Flu  -     cephalexin (Keflex) 500 MG  capsule; Take 1 capsule by mouth 3 (Three) Times a Day.  Dispense: 30 capsule; Refill: 0    7. Mouth sores  -     POCT urinalysis dipstick, automated  -     POCT rapid strep A  -     POCT SARS-CoV-2 Antigen GUILHERME + Flu    8. Other fatigue  -     POCT urinalysis dipstick, automated  -     POCT rapid strep A  -     POCT SARS-CoV-2 Antigen GUILHERME + Flu  -     Urine Culture - Urine, Urine, Clean Catch  -     CBC & Differential  -     Basic Metabolic Panel  -     Vitamin B12 & Folate    9. Leukocytes in urine  -     Urine Culture - Urine, Urine, Clean Catch  -     Basic Metabolic Panel    10. Urinary frequency  -     Urine Culture - Urine, Urine, Clean Catch  -     Basic Metabolic Panel    11. Herpangina  Comments:  we are mixing 1:1 the liquid benadryl and xylocaine viscous and then they will add the equal part of the OTC mylanta and then do 1 tsp swish and spit QID    12. Acute non-recurrent maxillary sinusitis  -     cephalexin (Keflex) 500 MG capsule; Take 1 capsule by mouth 3 (Three) Times a Day.  Dispense: 30 capsule; Refill: 0    We will treat the acute sinusitis type symptoms upper respiratory with the cephalexin 500 mg 3 times a day for 10 days    And then for the herpangina we are mixing one-to-one liquid Benadryl and Xylocaine Viscous and they will get in equal parts added in the Mylanta over-the-counter and do just 1 teaspoon swish and spit up to 4 times a day    Obtaining labs today and urine culture pending        Follow Up     Return if symptoms worsen or fail to improve.    Patient was given instructions and counseling regarding her condition or for health maintenance advice. Please see specific information pulled into the AVS if appropriate.     BMI is within normal parameters. No other follow-up for BMI required.      Holly CHRISTINE Morin  reports that she has been smoking cigarettes. She started smoking about 20 years ago. She has a 20 pack-year smoking history. She has never used smokeless tobacco. I have  educated her on the risk of diseases from using tobacco products such as cancer, COPD, and heart disease.

## 2024-07-09 NOTE — PROGRESS NOTES
Venipuncture Blood Specimen Collection  Venipuncture performed in left arm by Kavya Morin with good hemostasis. Patient tolerated the procedure well without complications.   07/09/24   Kavya Morin

## 2024-07-10 ENCOUNTER — TELEPHONE (OUTPATIENT)
Dept: FAMILY MEDICINE CLINIC | Facility: CLINIC | Age: 54
End: 2024-07-10
Payer: COMMERCIAL

## 2024-07-10 NOTE — TELEPHONE ENCOUNTER
Called patient's cell number left message and called home number and was able to reach patient regarding the critically low potassium at 2.5 and the mildly low magnesium level and that regarding the potassium is still low and with all of her nausea and vomiting and symptoms she needs to go to the emergency room as it may have dropped even lower and this is a critical low she may require the potassium replacement patient verbalized her understanding and is going now

## 2024-07-10 NOTE — PROGRESS NOTES
Please mail letter to patient stating    Holly the urine culture thus far is showing no bacterial growth

## 2024-07-10 NOTE — PROGRESS NOTES
Personally called and spoke with patient regarding the critically low potassium at 2.5 and that she needs to go immediately to an emergency room to have this level rechecked as with all her nausea and vomiting and other symptoms she may even be lower and may require IV potassium replacement patient verbalized understanding and will be going now also made her aware of the low magnesium as well

## 2024-07-11 LAB — BACTERIA SPEC AEROBE CULT: NO GROWTH

## 2024-09-18 DIAGNOSIS — F41.9 ANXIETY: ICD-10-CM

## 2024-09-18 DIAGNOSIS — F33.9 EPISODE OF RECURRENT MAJOR DEPRESSIVE DISORDER, UNSPECIFIED DEPRESSION EPISODE SEVERITY: ICD-10-CM

## 2024-09-18 RX ORDER — DULOXETIN HYDROCHLORIDE 30 MG/1
90 CAPSULE, DELAYED RELEASE ORAL DAILY
Qty: 90 CAPSULE | Refills: 0 | Status: SHIPPED | OUTPATIENT
Start: 2024-09-18

## 2024-10-23 DIAGNOSIS — F33.9 EPISODE OF RECURRENT MAJOR DEPRESSIVE DISORDER, UNSPECIFIED DEPRESSION EPISODE SEVERITY: ICD-10-CM

## 2024-10-23 DIAGNOSIS — F41.9 ANXIETY: ICD-10-CM

## 2024-10-23 RX ORDER — DULOXETIN HYDROCHLORIDE 30 MG/1
90 CAPSULE, DELAYED RELEASE ORAL DAILY
Qty: 90 CAPSULE | Refills: 0 | OUTPATIENT
Start: 2024-10-23

## 2024-10-23 NOTE — TELEPHONE ENCOUNTER
Caller: Holly Morin    Relationship: Self    Best call back number: [phone]      Requested Prescriptions:   Requested Prescriptions     Pending Prescriptions Disp Refills    DULoxetine (CYMBALTA) 30 MG capsule 90 capsule 0     Sig: Take 3 capsules by mouth Daily.        Pharmacy where request should be sent: [facility] [address] [address] - [phone] [facility] [phone] FX     Last office visit with prescribing clinician: 7/9/2024   Last telemedicine visit with prescribing clinician: Visit date not found   Next office visit with prescribing clinician: 10/31/2024     Additional details provided by patient:     THE PATIENT IS OUT OF THIS MEDICATION. SHE  IS REQUESTING A SHORT SUPPLY UNTIL HER APPOINTMENT ON 10/31/24    Does the patient have less than a 3 day supply:  [x] Yes  [] No    Would you like a call back once the refill request has been completed: [] Yes [x] No    If the office needs to give you a call back, can they leave a voicemail: [] Yes [x] No    Raya Bethea Rep   10/23/24 16:48 EDT

## 2024-10-28 DIAGNOSIS — F33.9 EPISODE OF RECURRENT MAJOR DEPRESSIVE DISORDER, UNSPECIFIED DEPRESSION EPISODE SEVERITY: ICD-10-CM

## 2024-10-28 DIAGNOSIS — F41.9 ANXIETY: ICD-10-CM

## 2024-10-28 RX ORDER — DULOXETIN HYDROCHLORIDE 30 MG/1
90 CAPSULE, DELAYED RELEASE ORAL DAILY
Qty: 90 CAPSULE | Refills: 2 | Status: SHIPPED | OUTPATIENT
Start: 2024-10-28

## 2024-10-31 DIAGNOSIS — F33.9 EPISODE OF RECURRENT MAJOR DEPRESSIVE DISORDER, UNSPECIFIED DEPRESSION EPISODE SEVERITY: ICD-10-CM

## 2024-10-31 DIAGNOSIS — F41.9 ANXIETY: ICD-10-CM

## 2024-10-31 RX ORDER — BUSPIRONE HYDROCHLORIDE 5 MG/1
5 TABLET ORAL 3 TIMES DAILY PRN
Qty: 90 TABLET | Refills: 0 | Status: SHIPPED | OUTPATIENT
Start: 2024-10-31

## 2024-11-14 DIAGNOSIS — F33.9 EPISODE OF RECURRENT MAJOR DEPRESSIVE DISORDER, UNSPECIFIED DEPRESSION EPISODE SEVERITY: ICD-10-CM

## 2024-11-14 DIAGNOSIS — F41.9 ANXIETY: ICD-10-CM

## 2024-11-15 RX ORDER — BUSPIRONE HYDROCHLORIDE 5 MG/1
5 TABLET ORAL 3 TIMES DAILY PRN
Qty: 30 TABLET | Refills: 0 | Status: SHIPPED | OUTPATIENT
Start: 2024-11-15

## 2024-11-21 DIAGNOSIS — K21.9 GASTROESOPHAGEAL REFLUX DISEASE WITHOUT ESOPHAGITIS: ICD-10-CM

## 2024-11-21 RX ORDER — FAMOTIDINE 40 MG/1
40 TABLET, FILM COATED ORAL DAILY
Qty: 30 TABLET | Refills: 0 | Status: SHIPPED | OUTPATIENT
Start: 2024-11-21

## 2025-01-27 DIAGNOSIS — F41.9 ANXIETY: ICD-10-CM

## 2025-01-27 DIAGNOSIS — F33.9 EPISODE OF RECURRENT MAJOR DEPRESSIVE DISORDER, UNSPECIFIED DEPRESSION EPISODE SEVERITY: ICD-10-CM

## 2025-01-27 RX ORDER — DULOXETIN HYDROCHLORIDE 30 MG/1
90 CAPSULE, DELAYED RELEASE ORAL DAILY
Qty: 21 CAPSULE | Refills: 0 | Status: SHIPPED | OUTPATIENT
Start: 2025-01-27 | End: 2025-01-30 | Stop reason: DRUGHIGH

## 2025-01-27 NOTE — TELEPHONE ENCOUNTER
Caller: Holly Morin    Relationship: Self    Best call back number: [phone]     Requested Prescriptions:   Requested Prescriptions     Pending Prescriptions Disp Refills    DULoxetine (CYMBALTA) 30 MG capsule 90 capsule 2     Sig: Take 3 capsules by mouth Daily.        Pharmacy where request should be sent: [place] [address] [address] - [phone] [facility] [phone] FX     Last office visit with prescribing clinician: 7/9/2024   Last telemedicine visit with prescribing clinician: Visit date not found   Next office visit with prescribing clinician: 1/30/2025     Additional details provided by patient: PATIENT IS COMPLETELY OUT OF MEDICATION. PATIENT REQUESTING SHORT TERM SUPPLY TO LAST UNTIL HER APPOINTMENT ON 01.30.2025    Does the patient have less than a 3 day supply:  [x] Yes  [] No    Would you like a call back once the refill request has been completed: [] Yes [] No    If the office needs to give you a call back, can they leave a voicemail: [] Yes [] No    Raya Renee Rep   01/27/25 12:52 EST

## 2025-01-30 ENCOUNTER — OFFICE VISIT (OUTPATIENT)
Dept: FAMILY MEDICINE CLINIC | Facility: CLINIC | Age: 55
End: 2025-01-30
Payer: COMMERCIAL

## 2025-01-30 VITALS
WEIGHT: 155 LBS | SYSTOLIC BLOOD PRESSURE: 138 MMHG | HEIGHT: 66 IN | DIASTOLIC BLOOD PRESSURE: 74 MMHG | TEMPERATURE: 97.4 F | HEART RATE: 101 BPM | BODY MASS INDEX: 24.91 KG/M2 | OXYGEN SATURATION: 98 %

## 2025-01-30 DIAGNOSIS — R53.83 FATIGUE, UNSPECIFIED TYPE: ICD-10-CM

## 2025-01-30 DIAGNOSIS — M53.86 SCIATICA OF LEFT SIDE ASSOCIATED WITH DISORDER OF LUMBAR SPINE: ICD-10-CM

## 2025-01-30 DIAGNOSIS — Z12.2 ENCOUNTER FOR SCREENING FOR LUNG CANCER: ICD-10-CM

## 2025-01-30 DIAGNOSIS — R79.89 ELEVATED FERRITIN LEVEL: ICD-10-CM

## 2025-01-30 DIAGNOSIS — K64.8 INTERNAL HEMORRHOIDS: ICD-10-CM

## 2025-01-30 DIAGNOSIS — R30.0 DYSURIA: ICD-10-CM

## 2025-01-30 DIAGNOSIS — R06.2 WHEEZING: ICD-10-CM

## 2025-01-30 DIAGNOSIS — R10.812 LEFT UPPER QUADRANT ABDOMINAL TENDERNESS WITHOUT REBOUND TENDERNESS: ICD-10-CM

## 2025-01-30 DIAGNOSIS — R11.2 NAUSEA AND VOMITING, UNSPECIFIED VOMITING TYPE: ICD-10-CM

## 2025-01-30 DIAGNOSIS — F33.9 EPISODE OF RECURRENT MAJOR DEPRESSIVE DISORDER, UNSPECIFIED DEPRESSION EPISODE SEVERITY: ICD-10-CM

## 2025-01-30 DIAGNOSIS — Z00.00 ANNUAL PHYSICAL EXAM: Primary | ICD-10-CM

## 2025-01-30 DIAGNOSIS — K21.9 GASTROESOPHAGEAL REFLUX DISEASE WITHOUT ESOPHAGITIS: ICD-10-CM

## 2025-01-30 DIAGNOSIS — M25.561 CHRONIC PAIN OF BOTH KNEES: ICD-10-CM

## 2025-01-30 DIAGNOSIS — G89.29 CHRONIC PAIN OF BOTH KNEES: ICD-10-CM

## 2025-01-30 DIAGNOSIS — E53.8 FOLIC ACID DEFICIENCY: ICD-10-CM

## 2025-01-30 DIAGNOSIS — M25.562 CHRONIC PAIN OF BOTH KNEES: ICD-10-CM

## 2025-01-30 DIAGNOSIS — E78.00 ELEVATED LDL CHOLESTEROL LEVEL: ICD-10-CM

## 2025-01-30 DIAGNOSIS — E55.9 VITAMIN D DEFICIENCY: ICD-10-CM

## 2025-01-30 DIAGNOSIS — E53.8 VITAMIN B12 DEFICIENCY: ICD-10-CM

## 2025-01-30 DIAGNOSIS — F41.9 ANXIETY: ICD-10-CM

## 2025-01-30 DIAGNOSIS — K59.00 CONSTIPATION, UNSPECIFIED CONSTIPATION TYPE: ICD-10-CM

## 2025-01-30 DIAGNOSIS — M25.469 SWELLING OF KNEE: ICD-10-CM

## 2025-01-30 PROBLEM — Z79.891 LONG-TERM CURRENT USE OF OPIATE ANALGESIC: Status: ACTIVE | Noted: 2019-05-17

## 2025-01-30 LAB
25(OH)D3 SERPL-MCNC: 18.6 NG/ML (ref 30–100)
ALBUMIN SERPL-MCNC: 4.1 G/DL (ref 3.5–5.2)
ALBUMIN/GLOB SERPL: 1.2 G/DL
ALP SERPL-CCNC: 121 U/L (ref 39–117)
ALT SERPL W P-5'-P-CCNC: 8 U/L (ref 1–33)
AMYLASE SERPL-CCNC: 123 U/L (ref 28–100)
ANION GAP SERPL CALCULATED.3IONS-SCNC: 9.7 MMOL/L (ref 5–15)
AST SERPL-CCNC: 13 U/L (ref 1–32)
BACTERIA UR QL AUTO: ABNORMAL /HPF
BILIRUB SERPL-MCNC: 0.3 MG/DL (ref 0–1.2)
BILIRUB UR QL STRIP: NEGATIVE
BUN SERPL-MCNC: 16 MG/DL (ref 6–20)
BUN/CREAT SERPL: 14 (ref 7–25)
CALCIUM SPEC-SCNC: 10.2 MG/DL (ref 8.6–10.5)
CHLORIDE SERPL-SCNC: 99 MMOL/L (ref 98–107)
CHOLEST SERPL-MCNC: 207 MG/DL (ref 0–200)
CLARITY UR: ABNORMAL
CO2 SERPL-SCNC: 30.3 MMOL/L (ref 22–29)
COLOR UR: YELLOW
CREAT SERPL-MCNC: 1.14 MG/DL (ref 0.57–1)
DEPRECATED RDW RBC AUTO: 44.5 FL (ref 37–54)
EGFRCR SERPLBLD CKD-EPI 2021: 57 ML/MIN/1.73
ERYTHROCYTE [DISTWIDTH] IN BLOOD BY AUTOMATED COUNT: 13.1 % (ref 12.3–15.4)
FERRITIN SERPL-MCNC: 100 NG/ML (ref 13–150)
FOLATE SERPL-MCNC: 5.39 NG/ML (ref 4.78–24.2)
GLOBULIN UR ELPH-MCNC: 3.4 GM/DL
GLUCOSE SERPL-MCNC: 89 MG/DL (ref 65–99)
GLUCOSE UR STRIP-MCNC: NEGATIVE MG/DL
HCT VFR BLD AUTO: 42.8 % (ref 34–46.6)
HDLC SERPL-MCNC: 48 MG/DL (ref 40–60)
HGB BLD-MCNC: 14.4 G/DL (ref 12–15.9)
HGB UR QL STRIP.AUTO: NEGATIVE
HOLD SPECIMEN: NORMAL
HYALINE CASTS UR QL AUTO: ABNORMAL /LPF
KETONES UR QL STRIP: NEGATIVE
LDLC SERPL CALC-MCNC: 133 MG/DL (ref 0–100)
LDLC/HDLC SERPL: 2.7 {RATIO}
LEUKOCYTE ESTERASE UR QL STRIP.AUTO: ABNORMAL
LIPASE SERPL-CCNC: 45 U/L (ref 13–60)
MCH RBC QN AUTO: 31.4 PG (ref 26.6–33)
MCHC RBC AUTO-ENTMCNC: 33.6 G/DL (ref 31.5–35.7)
MCV RBC AUTO: 93.2 FL (ref 79–97)
NITRITE UR QL STRIP: POSITIVE
PH UR STRIP.AUTO: 7 [PH] (ref 5–8)
PLATELET # BLD AUTO: 314 10*3/MM3 (ref 140–450)
PMV BLD AUTO: 10.4 FL (ref 6–12)
POTASSIUM SERPL-SCNC: 3.8 MMOL/L (ref 3.5–5.2)
PROT SERPL-MCNC: 7.5 G/DL (ref 6–8.5)
PROT UR QL STRIP: NEGATIVE
RBC # BLD AUTO: 4.59 10*6/MM3 (ref 3.77–5.28)
RBC # UR STRIP: ABNORMAL /HPF
REF LAB TEST METHOD: ABNORMAL
SODIUM SERPL-SCNC: 139 MMOL/L (ref 136–145)
SP GR UR STRIP: 1.02 (ref 1–1.03)
SQUAMOUS #/AREA URNS HPF: ABNORMAL /HPF
TRIGL SERPL-MCNC: 146 MG/DL (ref 0–150)
TSH SERPL DL<=0.05 MIU/L-ACNC: 1.72 UIU/ML (ref 0.27–4.2)
UROBILINOGEN UR QL STRIP: ABNORMAL
VIT B12 BLD-MCNC: 291 PG/ML (ref 211–946)
VLDLC SERPL-MCNC: 26 MG/DL (ref 5–40)
WBC # UR STRIP: ABNORMAL /HPF
WBC NRBC COR # BLD AUTO: 10.17 10*3/MM3 (ref 3.4–10.8)

## 2025-01-30 PROCEDURE — 1160F RVW MEDS BY RX/DR IN RCRD: CPT | Performed by: NURSE PRACTITIONER

## 2025-01-30 PROCEDURE — 36415 COLL VENOUS BLD VENIPUNCTURE: CPT | Performed by: NURSE PRACTITIONER

## 2025-01-30 PROCEDURE — 82728 ASSAY OF FERRITIN: CPT | Performed by: NURSE PRACTITIONER

## 2025-01-30 PROCEDURE — 87186 SC STD MICRODIL/AGAR DIL: CPT | Performed by: NURSE PRACTITIONER

## 2025-01-30 PROCEDURE — 82150 ASSAY OF AMYLASE: CPT | Performed by: NURSE PRACTITIONER

## 2025-01-30 PROCEDURE — 84443 ASSAY THYROID STIM HORMONE: CPT | Performed by: NURSE PRACTITIONER

## 2025-01-30 PROCEDURE — 80053 COMPREHEN METABOLIC PANEL: CPT | Performed by: NURSE PRACTITIONER

## 2025-01-30 PROCEDURE — 87086 URINE CULTURE/COLONY COUNT: CPT | Performed by: NURSE PRACTITIONER

## 2025-01-30 PROCEDURE — 81001 URINALYSIS AUTO W/SCOPE: CPT | Performed by: NURSE PRACTITIONER

## 2025-01-30 PROCEDURE — 82607 VITAMIN B-12: CPT | Performed by: NURSE PRACTITIONER

## 2025-01-30 PROCEDURE — 87088 URINE BACTERIA CULTURE: CPT | Performed by: NURSE PRACTITIONER

## 2025-01-30 PROCEDURE — 99396 PREV VISIT EST AGE 40-64: CPT | Performed by: NURSE PRACTITIONER

## 2025-01-30 PROCEDURE — 82306 VITAMIN D 25 HYDROXY: CPT | Performed by: NURSE PRACTITIONER

## 2025-01-30 PROCEDURE — 85027 COMPLETE CBC AUTOMATED: CPT | Performed by: NURSE PRACTITIONER

## 2025-01-30 PROCEDURE — 1125F AMNT PAIN NOTED PAIN PRSNT: CPT | Performed by: NURSE PRACTITIONER

## 2025-01-30 PROCEDURE — 82746 ASSAY OF FOLIC ACID SERUM: CPT | Performed by: NURSE PRACTITIONER

## 2025-01-30 PROCEDURE — 80061 LIPID PANEL: CPT | Performed by: NURSE PRACTITIONER

## 2025-01-30 PROCEDURE — 83690 ASSAY OF LIPASE: CPT | Performed by: NURSE PRACTITIONER

## 2025-01-30 PROCEDURE — 1159F MED LIST DOCD IN RCRD: CPT | Performed by: NURSE PRACTITIONER

## 2025-01-30 RX ORDER — SYRINGE WITH NEEDLE, 1 ML 25GX5/8"
1 SYRINGE, EMPTY DISPOSABLE MISCELLANEOUS
Qty: 6 EACH | Refills: 0 | Status: SHIPPED | OUTPATIENT
Start: 2025-01-30

## 2025-01-30 RX ORDER — CYANOCOBALAMIN 1000 UG/ML
1000 INJECTION, SOLUTION INTRAMUSCULAR; SUBCUTANEOUS
Qty: 1 ML | Refills: 5 | Status: SHIPPED | OUTPATIENT
Start: 2025-01-30

## 2025-01-30 RX ORDER — ONDANSETRON 4 MG/1
4 TABLET, ORALLY DISINTEGRATING ORAL EVERY 8 HOURS PRN
Qty: 30 TABLET | Refills: 5 | Status: SHIPPED | OUTPATIENT
Start: 2025-01-30

## 2025-01-30 RX ORDER — BUSPIRONE HYDROCHLORIDE 7.5 MG/1
7.5 TABLET ORAL 3 TIMES DAILY PRN
Qty: 90 TABLET | Refills: 5 | Status: SHIPPED | OUTPATIENT
Start: 2025-01-30

## 2025-01-30 RX ORDER — DULOXETIN HYDROCHLORIDE 60 MG/1
120 CAPSULE, DELAYED RELEASE ORAL DAILY
Qty: 60 CAPSULE | Refills: 5 | Status: SHIPPED | OUTPATIENT
Start: 2025-01-30

## 2025-01-30 RX ORDER — HYDROCORTISONE 25 MG/G
CREAM TOPICAL 2 TIMES DAILY
Qty: 28 G | Refills: 2 | Status: SHIPPED | OUTPATIENT
Start: 2025-01-30

## 2025-01-30 RX ORDER — FAMOTIDINE 40 MG/1
40 TABLET, FILM COATED ORAL DAILY
Qty: 30 TABLET | Refills: 5 | Status: SHIPPED | OUTPATIENT
Start: 2025-01-30

## 2025-01-30 RX ORDER — FOLIC ACID 1 MG/1
1 TABLET ORAL DAILY
Qty: 30 TABLET | Refills: 5 | Status: SHIPPED | OUTPATIENT
Start: 2025-01-30

## 2025-01-30 RX ORDER — DOCUSATE SODIUM 100 MG/1
100 CAPSULE, LIQUID FILLED ORAL 2 TIMES DAILY
Qty: 30 CAPSULE | Refills: 5 | Status: SHIPPED | OUTPATIENT
Start: 2025-01-30

## 2025-01-30 RX ORDER — ALBUTEROL SULFATE 90 UG/1
2 INHALANT RESPIRATORY (INHALATION) EVERY 6 HOURS PRN
Qty: 18 G | Refills: 0 | Status: SHIPPED | OUTPATIENT
Start: 2025-01-30

## 2025-01-30 NOTE — PROGRESS NOTES
Venipuncture Blood Specimen Collection  Venipuncture performed in left arm  by Kavya Morin with good hemostasis. Patient tolerated the procedure well without complications.   01/30/25   Kavya Morin

## 2025-02-01 DIAGNOSIS — N28.9 RENAL INSUFFICIENCY: ICD-10-CM

## 2025-02-01 DIAGNOSIS — R74.8 ELEVATED AMYLASE: ICD-10-CM

## 2025-02-01 DIAGNOSIS — R74.8 ELEVATED ALKALINE PHOSPHATASE LEVEL: ICD-10-CM

## 2025-02-01 DIAGNOSIS — E55.9 VITAMIN D DEFICIENCY: Primary | ICD-10-CM

## 2025-02-01 RX ORDER — ERGOCALCIFEROL 1.25 MG/1
50000 CAPSULE ORAL WEEKLY
Qty: 5 CAPSULE | Refills: 2 | Status: SHIPPED | OUTPATIENT
Start: 2025-02-01

## 2025-02-01 NOTE — PROGRESS NOTES
Please mail letter to patient stating    Holly your vitamin D was low so we will need to do the high-dose once weekly vitamin D2 50,000 IUs and you will do that weekly dose for 3 months and then we will need to recheck the level    And then your amylase was just slightly elevated at 123 and should be 100 or less and then the comprehensive panel shows that your glucose was normal and your electrolytes were normal but the kidney function test show the your creatinine was a little bit elevated at 1.14 and it should be 1.00 and because of this it dropped your filtration/function of the kidneys with an eGFR at 57 which actually places you at chronic kidney disease stage III and normally your eGFR runs in the 60s 70s and 80s-so I want you to remain well-hydrated and then I want to recheck this in 1 to 2 weeks I will drop in the order     And then with regards to your liver function tests everything was normal with the exception of a modestly elevated alkaline phos at 121 and should be 117 or less and then your thyroid levels were normal range and your ferritin was the lowest it has been in over 4 years normal range at 100 and your blood counts were normal range and the vitamin B12 and folic acid were in normal range but the B12 was on the low end of normal so you still need to continue Your monthly injection-and then your urinalysis shows moderate amounts of leukocytes positive nitrites and microscopically there was 4+ bacteria and there is a urine culture pending and once it results I will see what antibiotic is indicated if any and we will let you know    And then your cholesterol panel shows normal triglycerides normal HDL and slightly elevated LDL at 133 and it should be less than 100 when fasting--and I did go ahead and calculate based on these lab results and history of smoking that your estimated risk for cardiovascular related event over the next 10 years is estimated at 6.5% and anyone at 5% or higher should  actually start a statin therapy which is a cholesterol medication-and also do a low-cholesterol diet-please let me know if you are willing to do this and then I can start that low-dose medication and recheck you at 3 months when you follow-up regarding the vitamin D    Also with the lipase being normal and only a modest elevation in the amylase and in the alkaline phos I will repeat those labs when we repeat your kidney test in a couple of weeks and make sure you are well-hydrated

## 2025-02-02 LAB — BACTERIA SPEC AEROBE CULT: ABNORMAL

## 2025-02-03 DIAGNOSIS — N39.0 E. COLI UTI: Primary | ICD-10-CM

## 2025-02-03 DIAGNOSIS — B96.20 E. COLI UTI: Primary | ICD-10-CM

## 2025-02-03 RX ORDER — NITROFURANTOIN 25; 75 MG/1; MG/1
100 CAPSULE ORAL 2 TIMES DAILY
Qty: 14 CAPSULE | Refills: 0 | Status: SHIPPED | OUTPATIENT
Start: 2025-02-03

## 2025-02-03 NOTE — PROGRESS NOTES
Please call and let Holly know that she does have a UTI and I am sending in the appropriate antibiotic based on the culture and sensitivity

## 2025-02-13 ENCOUNTER — LAB (OUTPATIENT)
Dept: LAB | Facility: HOSPITAL | Age: 55
End: 2025-02-13
Payer: COMMERCIAL

## 2025-02-13 ENCOUNTER — HOSPITAL ENCOUNTER (OUTPATIENT)
Dept: ULTRASOUND IMAGING | Facility: HOSPITAL | Age: 55
Discharge: HOME | End: 2025-02-13
Payer: COMMERCIAL

## 2025-02-13 DIAGNOSIS — R74.8 ELEVATED ALKALINE PHOSPHATASE LEVEL: ICD-10-CM

## 2025-02-13 DIAGNOSIS — N28.9 RENAL INSUFFICIENCY: ICD-10-CM

## 2025-02-13 DIAGNOSIS — M25.469 SWELLING OF KNEE: ICD-10-CM

## 2025-02-13 DIAGNOSIS — R74.8 ELEVATED AMYLASE: ICD-10-CM

## 2025-02-13 PROCEDURE — 80053 COMPREHEN METABOLIC PANEL: CPT

## 2025-02-13 PROCEDURE — 76999 ECHO EXAMINATION PROCEDURE: CPT

## 2025-02-13 PROCEDURE — 36415 COLL VENOUS BLD VENIPUNCTURE: CPT

## 2025-02-13 PROCEDURE — 82150 ASSAY OF AMYLASE: CPT

## 2025-02-14 LAB
ALBUMIN SERPL-MCNC: 3.8 G/DL (ref 3.5–5.2)
ALBUMIN/GLOB SERPL: 1.2 G/DL
ALP SERPL-CCNC: 126 U/L (ref 39–117)
ALT SERPL W P-5'-P-CCNC: 7 U/L (ref 1–33)
AMYLASE SERPL-CCNC: 105 U/L (ref 28–100)
ANION GAP SERPL CALCULATED.3IONS-SCNC: 11 MMOL/L (ref 5–15)
AST SERPL-CCNC: 14 U/L (ref 1–32)
BILIRUB SERPL-MCNC: <0.2 MG/DL (ref 0–1.2)
BUN SERPL-MCNC: 8 MG/DL (ref 6–20)
BUN/CREAT SERPL: 7.6 (ref 7–25)
CALCIUM SPEC-SCNC: 9.6 MG/DL (ref 8.6–10.5)
CHLORIDE SERPL-SCNC: 100 MMOL/L (ref 98–107)
CO2 SERPL-SCNC: 29 MMOL/L (ref 22–29)
CREAT SERPL-MCNC: 1.05 MG/DL (ref 0.57–1)
EGFRCR SERPLBLD CKD-EPI 2021: 62.9 ML/MIN/1.73
GLOBULIN UR ELPH-MCNC: 3.2 GM/DL
GLUCOSE SERPL-MCNC: 84 MG/DL (ref 65–99)
POTASSIUM SERPL-SCNC: 3.9 MMOL/L (ref 3.5–5.2)
PROT SERPL-MCNC: 7 G/DL (ref 6–8.5)
SODIUM SERPL-SCNC: 140 MMOL/L (ref 136–145)

## 2025-02-17 DIAGNOSIS — R10.11 ABDOMINAL PAIN, RUQ: Primary | ICD-10-CM

## 2025-02-17 DIAGNOSIS — R74.8 ELEVATED AMYLASE: ICD-10-CM

## 2025-02-17 DIAGNOSIS — R11.2 NAUSEA AND VOMITING, UNSPECIFIED VOMITING TYPE: ICD-10-CM

## 2025-02-17 NOTE — PROGRESS NOTES
Please call and let Holly know that the amylase is a little bit lower almost down to normal comparative to the 2 weeks ago when it was 123-and it is at 105 and normal is 100 or less-so we could do 1 of 2 things if she is still having any right upper quadrant pains we can go ahead and proceed with ordering the CT of the abdomen to evaluate the pancreas or if her pain has resolved we can repeat the amylase to see if it is normalized--just let me know and I can place orders a  accordingly--also her kidney test have improved

## 2025-02-18 DIAGNOSIS — M25.861 CYST OF KNEE JOINT, RIGHT: Primary | ICD-10-CM

## 2025-02-18 DIAGNOSIS — M25.469 SWELLING OF KNEE: ICD-10-CM

## 2025-02-18 DIAGNOSIS — R93.89 ABNORMAL ULTRASOUND: ICD-10-CM

## 2025-03-18 ENCOUNTER — HOSPITAL ENCOUNTER (OUTPATIENT)
Dept: CT IMAGING | Facility: HOSPITAL | Age: 55
Discharge: HOME | End: 2025-03-18
Payer: COMMERCIAL

## 2025-03-18 ENCOUNTER — HOSPITAL ENCOUNTER (OUTPATIENT)
Dept: MRI IMAGING | Facility: HOSPITAL | Age: 55
Discharge: HOME | End: 2025-03-18
Payer: COMMERCIAL

## 2025-03-18 DIAGNOSIS — R74.8 ELEVATED AMYLASE: ICD-10-CM

## 2025-03-18 DIAGNOSIS — R10.11 ABDOMINAL PAIN, RUQ: ICD-10-CM

## 2025-03-18 DIAGNOSIS — M25.469 SWELLING OF KNEE: ICD-10-CM

## 2025-03-18 DIAGNOSIS — R11.2 NAUSEA AND VOMITING, UNSPECIFIED VOMITING TYPE: ICD-10-CM

## 2025-03-18 DIAGNOSIS — M25.861 CYST OF KNEE JOINT, RIGHT: ICD-10-CM

## 2025-03-18 DIAGNOSIS — R93.89 ABNORMAL ULTRASOUND: ICD-10-CM

## 2025-03-18 LAB
CREAT BLDA-MCNC: 1.1 MG/DL (ref 0.6–1.3)
EGFRCR SERPLBLD CKD-EPI 2021: 59.5 ML/MIN/1.73

## 2025-03-18 PROCEDURE — 73721 MRI JNT OF LWR EXTRE W/O DYE: CPT

## 2025-03-18 PROCEDURE — 82565 ASSAY OF CREATININE: CPT

## 2025-03-18 PROCEDURE — 74178 CT ABD&PLV WO CNTR FLWD CNTR: CPT

## 2025-03-18 PROCEDURE — 25510000001 IOPAMIDOL PER 1 ML: Performed by: NURSE PRACTITIONER

## 2025-03-18 RX ORDER — IOPAMIDOL 755 MG/ML
100 INJECTION, SOLUTION INTRAVASCULAR
Status: COMPLETED | OUTPATIENT
Start: 2025-03-18 | End: 2025-03-18

## 2025-03-18 RX ADMIN — IOPAMIDOL 100 ML: 755 INJECTION, SOLUTION INTRAVENOUS at 15:12

## 2025-03-20 ENCOUNTER — RESULTS FOLLOW-UP (OUTPATIENT)
Dept: CT IMAGING | Facility: HOSPITAL | Age: 55
End: 2025-03-20
Payer: COMMERCIAL

## 2025-03-20 NOTE — LETTER
Holly Morin  [address] [address]    March 20, 2025     Dear Ms. Mikel:    Holly, the CT of the abdomen and pelvis does show bladder wall thickening compatible with cystitis of the urinary bladder-and with your history of smoking I would recommend seeing a urologist-please let me know if you are okay with me proceeding with this referral     And then it also shows sigmoid diverticulosis but no diverticulitis    Resulted Orders   CT Abdomen Pelvis With & Without Contrast    Narrative    CT ABDOMEN PELVIS W WO CONTRAST    Date of Exam: 3/18/2025 3:06 PM EDT    Indication: persistent RUQ pain and nausea and elevated amylase.    Comparison: CT abdomen pelvis dated 4/3/2024    Technique: Axial CT images were obtained of the abdomen and pelvis before and after the uneventful intravenous administration of iodinated contrast. Sagittal and coronal reconstructions were performed.  Automated exposure control and iterative   reconstruction methods were used.      Findings:  Lung Bases:    The visualized lung bases and lower mediastinal structures are unremarkable.      Liver:Liver is normal in size and CT density. No focal lesions.      Biliary/Gallbladder: There are cholecystectomy changes. Mild postcholecystectomy dilatation of common bile duct      Spleen:Spleen is normal in size and CT density.    Pancreas:   Pancreas shows homogeneous density. There is no evidence of pancreatic mass or peripancreatic fluid.      Kidneys: Kidneys are normal in size. There are no stones or hydronephrosis.      Adrenals: Adrenal glands are unremarkable.      Retroperitoneal/Lymph Nodes/Vasculature: No retroperitoneal adenopathy is identified by size criteria.      Gastrointestinal/Mesentery: The bowel loops are non-dilated without definite wall thickening. Sigmoid diverticulosis with no diverticulitis. The appendix appears within normal limits. No evidence of obstruction. No free air.    Bladder: There is interval  increase in extensive concentric bladder wall thickening compatible with cystitis    No acute abnormalities in the pelvis      Bony Structures:  Visualized bony structures are consistent with the patient's age.          Impression    Impression:  1.Interval increase in extensive concentric bladder wall thickening compatible with cystitis. Clinical and urinalysis correlation recommended  2.Sigmoid diverticulosis with no diverticulitis.        Electronically Signed: Johnnie Walker MD    3/20/2025 8:44 AM EDT    Workstation ID: [id number]         If you have any questions or concerns, please don't hesitate to call.         Sincerely,        ASAF Ayala

## 2025-03-20 NOTE — PROGRESS NOTES
Please mail letter to patient stating    Holly, the CT of the abdomen and pelvis does show bladder wall thickening compatible with cystitis of the urinary bladder-and with your history of smoking I would recommend seeing a urologist-please let me know if you are okay with me proceeding with this referral    And then it also shows sigmoid diverticulosis but no diverticulitis

## 2025-03-31 ENCOUNTER — RESULTS FOLLOW-UP (OUTPATIENT)
Dept: FAMILY MEDICINE CLINIC | Facility: CLINIC | Age: 55
End: 2025-03-31
Payer: COMMERCIAL

## 2025-03-31 DIAGNOSIS — R93.6 ABNORMAL MRI, KNEE: Primary | ICD-10-CM

## 2025-03-31 DIAGNOSIS — M17.11 OSTEOARTHRITIS OF RIGHT KNEE, UNSPECIFIED OSTEOARTHRITIS TYPE: ICD-10-CM

## 2025-03-31 DIAGNOSIS — M25.469 SWELLING OF KNEE: ICD-10-CM

## 2025-03-31 DIAGNOSIS — M25.861 CYST OF KNEE JOINT, RIGHT: ICD-10-CM

## 2025-03-31 NOTE — LETTER
Holly Morin  [address] [address]    March 31, 2025     Dear Ms. Morin:    Holly the MRI of the knee shows a moderate joint effusion/fluid and then complex macerated tearing in the horn of the medial meniscus and then also a multilobulated septated structure posterior to the knee that could represent a complex ganglion cyst-they did say we could reevaluate with an MRI with contrast but I would strongly advise being referred to orthopedics for further evaluation and treatment-let me know if you are okay with me proceeding with this referral     Resulted Orders   MRI Knee Right Without Contrast    Narrative    MRI KNEE RIGHT  WO CONTRAST    Date of Exam: 3/18/2025 2:31 PM EDT    Indication: swelling of back of knee and pain and abnormal US.     Comparison: Right knee x-ray 8/21/2018, right knee ultrasound 2/13/2025    Technique:  Routine multiplanar/multisequence images of the right knee were obtained without contrast administration.      Findings:  There is some increased signal and irregularity at the posterior horn-root junction of the medial meniscus (series 9 image 9), compatible with complex macerated tearing. There is some increased signal and blunting of the inner margin of the medial   meniscal body compatible with some inner margin tearing (series 11 image 15). There is full-thickness chondral loss throughout the central weightbearing portion of the medial compartment (series 11 image 15); there are associated osteophytic changes of   the medial compartment. The medial supporting structures are normal.    The lateral meniscus is normal. There is mild diffuse chondral thinning throughout the lateral compartment without evidence of high-grade or full-thickness chondral loss. The lateral supporting structures appear unremarkable.    The anterior cruciate ligament and posterior cruciate ligament are normal.    The quadriceps tendon and patellar tendon are normal. There is high-grade  chondral loss along the superior portion of the patella at the median patellar ridge and medial facet. There is chondromalacia and chondral fissuring of the trochlear cartilage.   Patellofemoral osteophytes are noted.    There is a moderate knee joint effusion with synovitis. 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    There is a multilobulated and septated structure at the posterior knee at the popliteal fossa deep to the lateral head of the gastrocnemius. This appears T1 isointense/hypointense, and T2 hyperintense, similar to joint fluid. This measures 2.5 x 2.8 x   3.7 cm in size. This demonstrates some thin and mildly thickened internal septations. This is not in the correct location for a Baker cyst. There is very questionable thin communication of this structure to the superior posterior joint space near the   insertion of the lateral head of gastrocnemius (series 9 image 16-17). The structure exerts mild mass effect on the adjacent structures. The posterior knee neurovasculature appears unremarkable.    Normal appearance of the muscles and subcutaneous tissues.    No focal bony lesion. No fracture or bony contusion.      Impression    Impression:  There is a multilobulated and septated structure at the posterior knee at the popliteal fossa deep to the lateral head of the gastrocnemius. This is not in the correct location for a Baker cyst. Signal characteristics are similar to joint fluid, however   prior ultrasound demonstrated internal complexity, and this lesion is incompletely evaluated in the absence of IV contrast and repeat imaging with IV contrast recommended for complete characterization. The lesion is somewhat indeterminant. This may   reflect a multilobulated ganglion cyst associated with the lateral head of gastrocnemius. Soft tissue neoplasm/sarcoma not entirely excluded although this is considered less  likely.      Moderate knee joint effusion with synovitis.      Complex macerated tearing of the posterior horn-root junction of the medial meniscus. There is full-thickness chondral loss throughout the central weightbearing portion of the medial compartment.      High-grade chondral loss along the superior portion of the patella at the median patellar ridge and medial facet. There is chondromalacia and chondral fissuring of the trochlear cartilage.        Electronically Signed: Brandon Yost MD    3/31/2025 8:46 AM EDT    Workstation ID: [id number]         If you have any questions or concerns, please don't hesitate to call.         Sincerely,        ASAF Ayala

## 2025-03-31 NOTE — PROGRESS NOTES
Please mail letter to patient stating    Holly the MRI of the knee shows a moderate joint effusion/fluid and then complex macerated tearing in the horn of the medial meniscus and then also a multilobulated septated structure posterior to the knee that could represent a complex ganglion cyst-they did say we could reevaluate with an MRI with contrast but I would strongly advise being referred to orthopedics for further evaluation and treatment-let me know if you are okay with me proceeding with this referral

## 2025-04-22 ENCOUNTER — OFFICE VISIT (OUTPATIENT)
Dept: ORTHOPEDIC SURGERY | Facility: CLINIC | Age: 55
End: 2025-04-22
Payer: COMMERCIAL

## 2025-04-22 ENCOUNTER — PREP FOR SURGERY (OUTPATIENT)
Dept: OTHER | Facility: HOSPITAL | Age: 55
End: 2025-04-22
Payer: COMMERCIAL

## 2025-04-22 VITALS
OXYGEN SATURATION: 95 % | HEIGHT: 66 IN | DIASTOLIC BLOOD PRESSURE: 95 MMHG | HEART RATE: 101 BPM | BODY MASS INDEX: 24.91 KG/M2 | SYSTOLIC BLOOD PRESSURE: 150 MMHG | WEIGHT: 155 LBS

## 2025-04-22 DIAGNOSIS — M17.0 OSTEOARTHRITIS OF BOTH KNEES, UNSPECIFIED OSTEOARTHRITIS TYPE: Primary | ICD-10-CM

## 2025-04-22 DIAGNOSIS — M25.562 LEFT KNEE PAIN, UNSPECIFIED CHRONICITY: ICD-10-CM

## 2025-04-22 DIAGNOSIS — M17.0 OSTEOARTHRITIS OF BOTH KNEES, UNSPECIFIED OSTEOARTHRITIS TYPE: ICD-10-CM

## 2025-04-22 DIAGNOSIS — M25.561 RIGHT KNEE PAIN, UNSPECIFIED CHRONICITY: Primary | ICD-10-CM

## 2025-04-22 PROBLEM — M17.9 OA (OSTEOARTHRITIS) OF KNEE: Status: ACTIVE | Noted: 2025-04-22

## 2025-04-22 RX ORDER — TRANEXAMIC ACID 10 MG/ML
1000 INJECTION, SOLUTION INTRAVENOUS ONCE
OUTPATIENT
Start: 2025-04-22 | End: 2025-04-22

## 2025-04-22 NOTE — PROGRESS NOTES
"Chief Complaint  Initial Evaluation of the Right Knee and Initial Evaluation of the Left Knee     Subjective      Holly Morin presents to [facility] for initial evaluation of the right knee.  She had a fall a few years ago and the pain has gradually gotten worse.  She can't stand to cook and do dishes.  She has pain below the patella around the lateral aspect of the knee up to the thigh. She had an arthroscopy in the past.  She had a MRI and is here to review.      Allergies   Allergen Reactions    Sulfa Antibiotics Swelling    Penicillins Rash        Social History     Socioeconomic History    Marital status:    Tobacco Use    Smoking status: Every Day     Average packs/day: 1 pack/day for 20.0 years (20.0 ttl pk-yrs)     Types: Cigarettes     Start date: 1/1/2004    Smokeless tobacco: Never    Tobacco comments:     1 PPD SMOKED FOR 10 YEARS   Vaping Use    Vaping status: Never Used   Substance and Sexual Activity    Alcohol use: Never    Drug use: Never    Sexual activity: Not Currently     Partners: Male     Birth control/protection: Hysterectomy        I reviewed the patient's chief complaint, history of present illness, review of systems, past medical history, surgical history, family history, social history, medications, and allergy list.     Review of Systems     Constitutional: Denies fevers, chills, weight loss  Cardiovascular: Denies chest pain, shortness of breath  Skin: Denies rashes, acute skin changes  Neurologic: Denies headache, loss of consciousness        Vital Signs:   /95   Pulse 101   Ht 166.4 cm (65.5\")   Wt 70.3 kg (155 lb)   SpO2 95%   BMI 25.40 kg/m²          Physical Exam  General: Alert. No acute distress    Ortho Exam        BILATERAL KNEE Flexion 95 RIGHT 100 LEFT . Extension -8 RIGHT -6 LEFT. Stable to varus/valgus stress. Stable to anterior/posterior drawer. Neurovascularly intact. Negative Reji. Negative Lachman. Positive EHL, " FHL, HS and TA. Sensation intact to light touch all 5 nerves of the foot. Ambulates with Antalgic gait. Patella is well tracking. Calf supple, non-tender. Positive tenderness to the medial joint line. Negative tenderness to the lateral joint line. Positive Crepitus. Good strength to hamstrings, quadriceps, dorsiflexors, and plantar flexors.  Knee Extensor Mechanism intact Mild swelling.         Procedures      Imaging Results (Most Recent)       Procedure Component Value Units Date/Time    XR Knee 3 View Right [[id number]] Resulted: 04/22/25 1505     Updated: 04/22/25 1508    XR Knee 3 View Left [[id number]] Resulted: 04/22/25 1505     Updated: 04/22/25 1508             Result Review :     X-Ray Report:  Right knee X-Ray  Indication: Evaluation of the right knee.    AP/Lateral and [place] view(s)  Findings: Advanced end stage bone on bone arthritis with osteophyte formation noted.    Prior studies available for comparison: no     X-Ray Report:  Left knee X-Ray  Indication: Evaluation of the left   AP/Lateral and [place] view(s)  Findings: Advanced end stage bone on bone arthritis with osteophyte formation noted.  .    Prior studies available for comparison: no           Narrative & Impression   MRI KNEE RIGHT  WO CONTRAST     Date of Exam: 3/18/2025 2:31 PM EDT     Indication: swelling of back of knee and pain and abnormal US.     Comparison: Right knee x-ray 8/21/2018, right knee ultrasound 2/13/2025     Technique:  Routine multiplanar/multisequence images of the right knee were obtained without contrast administration.        Findings:  There is some increased signal and irregularity at the posterior horn-root junction of the medial meniscus (series 9 image 9), compatible with complex macerated tearing. There is some increased signal and blunting of the inner margin of the medial   meniscal body compatible with some inner margin tearing (series 11 image 15). There is full-thickness chondral loss throughout the  central weightbearing portion of the medial compartment (series 11 image 15); there are associated osteophytic changes of   the medial compartment. The medial supporting structures are normal.     The lateral meniscus is normal. There is mild diffuse chondral thinning throughout the lateral compartment without evidence of high-grade or full-thickness chondral loss. The lateral supporting structures appear unremarkable.     The anterior cruciate ligament and posterior cruciate ligament are normal.     The quadriceps tendon and patellar tendon are normal. There is high-grade chondral loss along the superior portion of the patella at the median patellar ridge and medial facet. There is chondromalacia and chondral fissuring of the trochlear cartilage.   Patellofemoral osteophytes are noted.     There is a moderate knee joint effusion with synovitis. 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     There is a multilobulated and septated structure at the posterior knee at the popliteal fossa deep to the lateral head of the gastrocnemius. This appears T1 isointense/hypointense, and T2 hyperintense, similar to joint fluid. This measures 2.5 x 2.8 x   3.7 cm in size. This demonstrates some thin and mildly thickened internal septations. This is not in the correct location for a Baker cyst. There is very questionable thin communication of this structure to the superior posterior joint space near the   insertion of the lateral head of gastrocnemius (series 9 image 16-17). The structure exerts mild mass effect on the adjacent structures. The posterior knee neurovasculature appears unremarkable.     Normal appearance of the muscles and subcutaneous tissues.     No focal bony lesion. No fracture or bony contusion.     IMPRESSION:  Impression:  There is a multilobulated and septated structure at the posterior knee at the popliteal fossa deep to the  lateral head of the gastrocnemius. This is not in the correct location for a Baker cyst. Signal characteristics are similar to joint fluid, however   prior ultrasound demonstrated internal complexity, and this lesion is incompletely evaluated in the absence of IV contrast and repeat imaging with IV contrast recommended for complete characterization. The lesion is somewhat indeterminant. This may   reflect a multilobulated ganglion cyst associated with the lateral head of gastrocnemius. Soft tissue neoplasm/sarcoma not entirely excluded although this is considered less likely.        Moderate knee joint effusion with synovitis.        Complex macerated tearing of the posterior horn-root junction of the medial meniscus. There is full-thickness chondral loss throughout the central weightbearing portion of the medial compartment.        High-grade chondral loss along the superior portion of the patella at the median patellar ridge and medial facet. There is chondromalacia and chondral fissuring of the trochlear cartilage.           Assessment and Plan     Diagnoses and all orders for this visit:    1. Right knee pain, unspecified chronicity (Primary)  -     XR Knee 3 View Right    2. Left knee pain, unspecified chronicity  -     XR Knee 3 View Left    3. Osteoarthritis of both knees, unspecified osteoarthritis type        Discussed the treatment plan with the patient. I reviewed the MRI results with the patient.     Discussed the treatment options with the patient, operative vs non-operative. The patient expressed understanding and wished to proceed with a right total knee arthroplasty .          Educated on risk of smoking/nicotine. Discussed options for smoking cessation regarding chantix, nicorette gum and/ or to call the quit hotline at [phone] , Discussed surgery., Risks/benefits discussed with patient including, but not limited to: infection, bleeding, neurovascular damage, re-rupture, aesthetic deformity,  need for further surgery, and death., Discussed with patient the implant type being used during surgery and patient understands., Surgery pamphlet given., Call or return if worsening symptoms., DME order for a 3 in 1 given today due to patient will be confined to one room/level of the home that does not offer a toilet during postop recovery. , and Patient does not have any metal allergies.     Follow Up     2 weeks postoperatively       Patient was given instructions and counseling regarding her condition or for health maintenance advice. Please see specific information pulled into the AVS if appropriate.     Scribed for Ray White MD by Brittni Robertson MA.  04/22/25   14:41 EDT    I have personally performed the services described in this document as scribed by the above individual and it is both accurate and complete. Ray White MD 04/23/25

## 2025-04-23 DIAGNOSIS — Z96.651 AFTERCARE FOLLOWING RIGHT KNEE JOINT REPLACEMENT SURGERY: Primary | ICD-10-CM

## 2025-04-23 DIAGNOSIS — Z47.1 AFTERCARE FOLLOWING RIGHT KNEE JOINT REPLACEMENT SURGERY: Primary | ICD-10-CM

## 2025-06-03 DIAGNOSIS — M17.0 OSTEOARTHRITIS OF BOTH KNEES, UNSPECIFIED OSTEOARTHRITIS TYPE: Primary | ICD-10-CM

## 2025-06-03 DIAGNOSIS — Z47.1 AFTERCARE FOLLOWING RIGHT KNEE JOINT REPLACEMENT SURGERY: Primary | ICD-10-CM

## 2025-06-03 DIAGNOSIS — Z96.651 AFTERCARE FOLLOWING RIGHT KNEE JOINT REPLACEMENT SURGERY: Primary | ICD-10-CM

## 2025-06-23 ENCOUNTER — TELEPHONE (OUTPATIENT)
Dept: ORTHOPEDIC SURGERY | Facility: CLINIC | Age: 55
End: 2025-06-23
Payer: COMMERCIAL

## 2025-06-23 NOTE — TELEPHONE ENCOUNTER
PER DORITA YO, IN PAT, PATIENT NO SHOWED FOR PRE-OP PAT TOTAL JOINT CLASS APPT TODAY.     SPOKE WITH PATIENT, PATIENT WISHES TO CX SX FOR 6/30/2025 AT THIS TIME.     PATIENT REQUESTED AN APPT TO SEE DR. CALLOWAY TO DISCUSS HER OPTIONS. APPT WAS MADE FOR 7/15/2025.     SPOKE WITH LE IN SCHEDULING TO CX SX FOR 6/30/2025

## 2025-08-06 ENCOUNTER — TELEPHONE (OUTPATIENT)
Dept: ORTHOPEDIC SURGERY | Facility: CLINIC | Age: 55
End: 2025-08-06
Payer: COMMERCIAL

## 2025-08-13 DIAGNOSIS — Z96.651 AFTERCARE FOLLOWING RIGHT KNEE JOINT REPLACEMENT SURGERY: Primary | ICD-10-CM

## 2025-08-13 DIAGNOSIS — Z47.1 AFTERCARE FOLLOWING RIGHT KNEE JOINT REPLACEMENT SURGERY: Primary | ICD-10-CM

## 2025-08-13 DIAGNOSIS — M17.0 OSTEOARTHRITIS OF BOTH KNEES, UNSPECIFIED OSTEOARTHRITIS TYPE: Primary | ICD-10-CM

## 2025-08-25 ENCOUNTER — PRE-ADMISSION TESTING (OUTPATIENT)
Dept: PREADMISSION TESTING | Facility: HOSPITAL | Age: 55
End: 2025-08-25
Payer: COMMERCIAL

## 2025-08-25 DIAGNOSIS — Z01.818 ENCOUNTER FOR PREADMISSION TESTING: Primary | ICD-10-CM

## 2025-08-25 DIAGNOSIS — M17.0 OSTEOARTHRITIS OF BOTH KNEES, UNSPECIFIED OSTEOARTHRITIS TYPE: ICD-10-CM

## 2025-08-25 LAB
ALBUMIN SERPL-MCNC: 4.1 G/DL (ref 3.5–5.2)
ALBUMIN/GLOB SERPL: 1.3 G/DL
ALP SERPL-CCNC: 116 U/L (ref 39–117)
ALT SERPL W P-5'-P-CCNC: 7 U/L (ref 1–33)
ANION GAP SERPL CALCULATED.3IONS-SCNC: 10.6 MMOL/L (ref 5–15)
AST SERPL-CCNC: 11 U/L (ref 1–32)
BASOPHILS # BLD AUTO: 0.05 10*3/MM3 (ref 0–0.2)
BASOPHILS NFR BLD AUTO: 0.5 % (ref 0–1.5)
BILIRUB SERPL-MCNC: 0.4 MG/DL (ref 0–1.2)
BUN SERPL-MCNC: 4.1 MG/DL (ref 6–20)
BUN/CREAT SERPL: 4.3 (ref 7–25)
CALCIUM SPEC-SCNC: 10 MG/DL (ref 8.6–10.5)
CHLORIDE SERPL-SCNC: 99 MMOL/L (ref 98–107)
CO2 SERPL-SCNC: 29.4 MMOL/L (ref 22–29)
CREAT SERPL-MCNC: 0.95 MG/DL (ref 0.57–1)
DEPRECATED RDW RBC AUTO: 43.8 FL (ref 37–54)
EGFRCR SERPLBLD CKD-EPI 2021: 70.9 ML/MIN/1.73
EOSINOPHIL # BLD AUTO: 0.18 10*3/MM3 (ref 0–0.4)
EOSINOPHIL NFR BLD AUTO: 1.7 % (ref 0.3–6.2)
ERYTHROCYTE [DISTWIDTH] IN BLOOD BY AUTOMATED COUNT: 12.9 % (ref 12.3–15.4)
GLOBULIN UR ELPH-MCNC: 3.1 GM/DL
GLUCOSE SERPL-MCNC: 83 MG/DL (ref 65–99)
HBA1C MFR BLD: 5 % (ref 4.8–5.6)
HCT VFR BLD AUTO: 40.2 % (ref 34–46.6)
HGB BLD-MCNC: 13.7 G/DL (ref 12–15.9)
IMM GRANULOCYTES # BLD AUTO: 0.03 10*3/MM3 (ref 0–0.05)
IMM GRANULOCYTES NFR BLD AUTO: 0.3 % (ref 0–0.5)
LYMPHOCYTES # BLD AUTO: 2.78 10*3/MM3 (ref 0.7–3.1)
LYMPHOCYTES NFR BLD AUTO: 26.3 % (ref 19.6–45.3)
MCH RBC QN AUTO: 31.6 PG (ref 26.6–33)
MCHC RBC AUTO-ENTMCNC: 34.1 G/DL (ref 31.5–35.7)
MCV RBC AUTO: 92.8 FL (ref 79–97)
MONOCYTES # BLD AUTO: 0.8 10*3/MM3 (ref 0.1–0.9)
MONOCYTES NFR BLD AUTO: 7.6 % (ref 5–12)
NEUTROPHILS NFR BLD AUTO: 6.74 10*3/MM3 (ref 1.7–7)
NEUTROPHILS NFR BLD AUTO: 63.6 % (ref 42.7–76)
NRBC BLD AUTO-RTO: 0 /100 WBC (ref 0–0.2)
PLATELET # BLD AUTO: 242 10*3/MM3 (ref 140–450)
PMV BLD AUTO: 9.6 FL (ref 6–12)
POTASSIUM SERPL-SCNC: 3.8 MMOL/L (ref 3.5–5.2)
PROT SERPL-MCNC: 7.2 G/DL (ref 6–8.5)
QT INTERVAL: 477 MS
QTC INTERVAL: 529 MS
RBC # BLD AUTO: 4.33 10*6/MM3 (ref 3.77–5.28)
SODIUM SERPL-SCNC: 139 MMOL/L (ref 136–145)
WBC NRBC COR # BLD AUTO: 10.58 10*3/MM3 (ref 3.4–10.8)

## 2025-08-25 PROCEDURE — 36415 COLL VENOUS BLD VENIPUNCTURE: CPT

## 2025-08-25 PROCEDURE — 85025 COMPLETE CBC W/AUTO DIFF WBC: CPT

## 2025-08-25 PROCEDURE — 83036 HEMOGLOBIN GLYCOSYLATED A1C: CPT

## 2025-08-25 PROCEDURE — 80053 COMPREHEN METABOLIC PANEL: CPT

## 2025-08-25 PROCEDURE — 93005 ELECTROCARDIOGRAM TRACING: CPT

## 2025-08-26 VITALS
BODY MASS INDEX: 24.99 KG/M2 | TEMPERATURE: 97.8 F | DIASTOLIC BLOOD PRESSURE: 82 MMHG | WEIGHT: 150 LBS | SYSTOLIC BLOOD PRESSURE: 166 MMHG | OXYGEN SATURATION: 96 % | HEIGHT: 65 IN | RESPIRATION RATE: 18 BRPM | HEART RATE: 80 BPM